# Patient Record
Sex: FEMALE | Race: WHITE | HISPANIC OR LATINO | Employment: PART TIME | URBAN - METROPOLITAN AREA
[De-identification: names, ages, dates, MRNs, and addresses within clinical notes are randomized per-mention and may not be internally consistent; named-entity substitution may affect disease eponyms.]

---

## 2017-06-26 ENCOUNTER — APPOINTMENT (EMERGENCY)
Dept: ULTRASOUND IMAGING | Facility: HOSPITAL | Age: 27
End: 2017-06-26
Payer: COMMERCIAL

## 2017-06-26 ENCOUNTER — HOSPITAL ENCOUNTER (EMERGENCY)
Facility: HOSPITAL | Age: 27
Discharge: HOME | End: 2017-06-26
Attending: EMERGENCY MEDICINE | Admitting: EMERGENCY MEDICINE
Payer: COMMERCIAL

## 2017-06-26 VITALS
DIASTOLIC BLOOD PRESSURE: 62 MMHG | SYSTOLIC BLOOD PRESSURE: 109 MMHG | RESPIRATION RATE: 16 BRPM | OXYGEN SATURATION: 100 % | TEMPERATURE: 98.7 F | WEIGHT: 160.94 LBS | HEART RATE: 63 BPM

## 2017-06-26 DIAGNOSIS — R10.2 PELVIC PAIN: Primary | ICD-10-CM

## 2017-06-26 LAB
ALBUMIN SERPL BCP-MCNC: 3.7 G/DL (ref 3.5–5)
ALP SERPL-CCNC: 60 U/L (ref 46–116)
ALT SERPL W P-5'-P-CCNC: 11 U/L (ref 12–78)
ANION GAP SERPL CALCULATED.3IONS-SCNC: 11 MMOL/L (ref 4–13)
AST SERPL W P-5'-P-CCNC: 15 U/L (ref 5–45)
B-HCG SERPL-ACNC: 29 MIU/ML
BASOPHILS # BLD AUTO: 0.05 THOUSANDS/ΜL (ref 0–0.1)
BASOPHILS NFR BLD AUTO: 1 % (ref 0–1)
BILIRUB SERPL-MCNC: 0.6 MG/DL (ref 0.2–1)
BUN SERPL-MCNC: 12 MG/DL (ref 5–25)
CALCIUM SERPL-MCNC: 8.7 MG/DL (ref 8.3–10.1)
CHLORIDE SERPL-SCNC: 104 MMOL/L (ref 100–108)
CLARITY, POC: CLEAR
CO2 SERPL-SCNC: 24 MMOL/L (ref 21–32)
COLOR, POC: YELLOW
CREAT SERPL-MCNC: 0.68 MG/DL (ref 0.6–1.3)
EOSINOPHIL # BLD AUTO: 0.13 THOUSAND/ΜL (ref 0–0.61)
EOSINOPHIL NFR BLD AUTO: 1 % (ref 0–6)
ERYTHROCYTE [DISTWIDTH] IN BLOOD BY AUTOMATED COUNT: 12.2 % (ref 11.6–15.1)
EXT BILIRUBIN, UA: NEGATIVE
EXT BLOOD URINE: NORMAL
EXT GLUCOSE, UA: NEGATIVE
EXT KETONES: NORMAL
EXT NITRITE, UA: NEGATIVE
EXT PH, UA: 7
EXT PROTEIN, UA: NEGATIVE
EXT SPECIFIC GRAVITY, UA: 1.01
EXT UROBILINOGEN: 0.2
GFR SERPL CREATININE-BSD FRML MDRD: >60 ML/MIN/1.73SQ M
GLUCOSE SERPL-MCNC: 81 MG/DL (ref 65–140)
HCG SERPL QL: POSITIVE
HCT VFR BLD AUTO: 38.4 % (ref 34.8–46.1)
HGB BLD-MCNC: 13 G/DL (ref 11.5–15.4)
LYMPHOCYTES # BLD AUTO: 2.46 THOUSANDS/ΜL (ref 0.6–4.47)
LYMPHOCYTES NFR BLD AUTO: 26 % (ref 14–44)
MCH RBC QN AUTO: 31 PG (ref 26.8–34.3)
MCHC RBC AUTO-ENTMCNC: 33.9 G/DL (ref 31.4–37.4)
MCV RBC AUTO: 91 FL (ref 82–98)
MONOCYTES # BLD AUTO: 0.73 THOUSAND/ΜL (ref 0.17–1.22)
MONOCYTES NFR BLD AUTO: 8 % (ref 4–12)
NEUTROPHILS # BLD AUTO: 6.06 THOUSANDS/ΜL (ref 1.85–7.62)
NEUTS SEG NFR BLD AUTO: 64 % (ref 43–75)
NRBC BLD AUTO-RTO: 0 /100 WBCS
PLATELET # BLD AUTO: 289 THOUSANDS/UL (ref 149–390)
PMV BLD AUTO: 9.7 FL (ref 8.9–12.7)
POTASSIUM SERPL-SCNC: 3.5 MMOL/L (ref 3.5–5.3)
PROT SERPL-MCNC: 6.8 G/DL (ref 6.4–8.2)
RBC # BLD AUTO: 4.2 MILLION/UL (ref 3.81–5.12)
SODIUM SERPL-SCNC: 139 MMOL/L (ref 136–145)
WBC # BLD AUTO: 9.48 THOUSAND/UL (ref 4.31–10.16)
WBC # BLD EST: NORMAL 10*3/UL

## 2017-06-26 PROCEDURE — 86901 BLOOD TYPING SEROLOGIC RH(D): CPT | Performed by: EMERGENCY MEDICINE

## 2017-06-26 PROCEDURE — 76815 OB US LIMITED FETUS(S): CPT

## 2017-06-26 PROCEDURE — 36415 COLL VENOUS BLD VENIPUNCTURE: CPT | Performed by: EMERGENCY MEDICINE

## 2017-06-26 PROCEDURE — 80053 COMPREHEN METABOLIC PANEL: CPT | Performed by: EMERGENCY MEDICINE

## 2017-06-26 PROCEDURE — 85025 COMPLETE CBC W/AUTO DIFF WBC: CPT | Performed by: EMERGENCY MEDICINE

## 2017-06-26 PROCEDURE — 84702 CHORIONIC GONADOTROPIN TEST: CPT | Performed by: EMERGENCY MEDICINE

## 2017-06-26 PROCEDURE — 86900 BLOOD TYPING SEROLOGIC ABO: CPT | Performed by: EMERGENCY MEDICINE

## 2017-06-26 PROCEDURE — 81002 URINALYSIS NONAUTO W/O SCOPE: CPT | Performed by: EMERGENCY MEDICINE

## 2017-06-26 PROCEDURE — 86850 RBC ANTIBODY SCREEN: CPT | Performed by: EMERGENCY MEDICINE

## 2017-06-26 PROCEDURE — 96360 HYDRATION IV INFUSION INIT: CPT

## 2017-06-26 PROCEDURE — 84703 CHORIONIC GONADOTROPIN ASSAY: CPT | Performed by: EMERGENCY MEDICINE

## 2017-06-26 PROCEDURE — 96361 HYDRATE IV INFUSION ADD-ON: CPT

## 2017-06-26 PROCEDURE — 99284 EMERGENCY DEPT VISIT MOD MDM: CPT

## 2017-06-26 RX ORDER — CEPHALEXIN 500 MG/1
500 CAPSULE ORAL 2 TIMES DAILY
COMMUNITY
End: 2017-10-10

## 2017-06-26 RX ADMIN — SODIUM CHLORIDE 1000 ML: 0.9 INJECTION, SOLUTION INTRAVENOUS at 16:37

## 2017-07-01 LAB
ABO GROUP BLD: NORMAL
BLD GP AB SCN SERPL QL: NEGATIVE
RH BLD: POSITIVE
SPECIMEN EXPIRATION DATE: NORMAL

## 2017-10-10 ENCOUNTER — HOSPITAL ENCOUNTER (EMERGENCY)
Facility: HOSPITAL | Age: 27
Discharge: HOME | End: 2017-10-11
Attending: EMERGENCY MEDICINE | Admitting: EMERGENCY MEDICINE
Payer: COMMERCIAL

## 2017-10-10 VITALS
HEART RATE: 82 BPM | BODY MASS INDEX: 23.49 KG/M2 | DIASTOLIC BLOOD PRESSURE: 64 MMHG | WEIGHT: 155 LBS | SYSTOLIC BLOOD PRESSURE: 118 MMHG | TEMPERATURE: 98.4 F | RESPIRATION RATE: 16 BRPM | OXYGEN SATURATION: 97 % | HEIGHT: 68 IN

## 2017-10-10 DIAGNOSIS — K64.4 EXTERNAL HEMORRHOID: Primary | ICD-10-CM

## 2017-10-10 RX ORDER — LIDOCAINE HYDROCHLORIDE 20 MG/ML
JELLY TOPICAL ONCE
Status: DISCONTINUED | OUTPATIENT
Start: 2017-10-10 | End: 2017-10-11 | Stop reason: HOSPADM

## 2017-10-10 RX ORDER — DOCUSATE SODIUM 100 MG/1
100 CAPSULE, LIQUID FILLED ORAL 2 TIMES DAILY PRN
Qty: 60 CAPSULE | Refills: 0 | Status: SHIPPED | OUTPATIENT
Start: 2017-10-10 | End: 2018-03-30

## 2017-10-10 RX ORDER — LIDOCAINE 50 MG/G
OINTMENT TOPICAL AS NEEDED
Qty: 35.44 G | Refills: 0 | Status: SHIPPED | OUTPATIENT
Start: 2017-10-10 | End: 2018-03-30

## 2017-10-11 PROCEDURE — 99282 EMERGENCY DEPT VISIT SF MDM: CPT

## 2017-10-11 NOTE — ED PROVIDER NOTES
History  Chief Complaint   Patient presents with    Hemorrhoids     pt c/o 8/10 pain in her rectum from external hemmorhoids x 4 days unrelieved by ointments, ice packs and witch hazel pads  Pt denies any rectal bleeding  HPI    None       Past Medical History:   Diagnosis Date    Hemorrhoids        History reviewed  No pertinent surgical history  History reviewed  No pertinent family history  I have reviewed and agree with the history as documented  Social History   Substance Use Topics    Smoking status: Never Smoker    Smokeless tobacco: Never Used    Alcohol use No        Review of Systems    Physical Exam  ED Triage Vitals   Temperature Pulse Respirations Blood Pressure SpO2   10/10/17 1943 10/10/17 1940 10/10/17 1940 10/10/17 1940 10/10/17 1940   98 4 °F (36 9 °C) 82 16 118/64 97 %      Temp Source Heart Rate Source Patient Position - Orthostatic VS BP Location FiO2 (%)   10/10/17 1943 10/10/17 1940 10/10/17 1940 10/10/17 1940 --   Oral Monitor Standing Right arm       Pain Score       10/10/17 1940       8           Physical Exam   Constitutional: She is oriented to person, place, and time  She appears well-developed and well-nourished  No distress  HENT:   Head: Normocephalic and atraumatic  Eyes: Conjunctivae are normal  Pupils are equal, round, and reactive to light  Neck: Normal range of motion  No tracheal deviation present  Cardiovascular: Normal rate, regular rhythm and intact distal pulses  Pulses:       Radial pulses are 2+ on the right side  Pulmonary/Chest: Effort normal  No respiratory distress  Abdominal: Soft  There is no tenderness  Genitourinary: Rectal exam shows external hemorrhoid and tenderness  Rectal exam shows no fissure, no mass and anal tone normal    Neurological: She is alert and oriented to person, place, and time  Skin: Skin is warm and dry  Psychiatric: She has a normal mood and affect   Her behavior is normal    Nursing note and vitals reviewed  ED Medications  Medications - No data to display    Diagnostic Studies  Labs Reviewed - No data to display    No orders to display       Procedures  Procedures      Phone Contacts  ED Phone Contact    ED Course  ED Course                                MDM  Number of Diagnoses or Management Options  External hemorrhoid: new and does not require workup  Diagnosis management comments: This is a 69-year-old female who presents here with pain from hemorrhoids  She has had a small hemorrhoids before that have never caused problems  She states she has never seen anybody for them previously  She states four days ago she developed pain in the area  She has been using Sitz baths, Anusol ointment, ice packs, and witch Hazel pads without improvement of the pain  She endorses significant pain with moving her bowels  She denies any rectal bleeding  She denies any trauma to the area, underlying GI issues, recent constipation, pushing or straining to move her bowels  She has no other complaints  ROS: Otherwise negative, unless stated as above  She is well-appearing, in no acute distress  She has a small, significantly tender hemorrhoid that is not thrombosed  There are no other signs of acute abnormalities  I discussed with her additional treatment at home with topical lidocaine, follow up with her primary care doctor and either GI doctor or surgeon for further evaluation, and she expresses understanding with this plan  CritCare Time    Disposition  Final diagnoses:   External hemorrhoid     ED Disposition     ED Disposition Condition Comment    Discharge  Evette Mei discharge to home/self care      Condition at discharge: Good        Follow-up Information     Follow up With Specialties Details Why Contact Navya Lin DO  Schedule an appointment as soon as possible for a visit to follow up on your symptoms Amor ARIZMENDI/ Abelino Alcaraz  [facility] Sil Hoff MD General Surgery Schedule an appointment as soon as possible for a visit to follow up with a surgeon for your hemorrhoids [address]  [address]  [address] [place] [address] [phone]          Patient's Medications   Discharge Prescriptions    DOCUSATE SODIUM (COLACE) 100 MG CAPSULE    Take 1 capsule by mouth 2 (two) times a day as needed (until hemorrhoids resolve)       Start Date: 10/10/2017End Date: --       Order Dose: 100 mg       Quantity: 60 capsule    Refills: 0    LIDOCAINE (XYLOCAINE) 5 % OINTMENT    Apply topically as needed (pain)       Start Date: 10/10/2017End Date: --       Order Dose: --       Quantity: 35 44 g    Refills: 0     No discharge procedures on file      ED Provider  Electronically Signed by       Kareen Mathis MD  10/11/17 1966

## 2017-10-11 NOTE — DISCHARGE INSTRUCTIONS
Continue taking medications that you have been taking take the stool softener until your hemorrhoids improved  Apply the topical lidocaine to the area as needed  Sit on a donut pillow when you are sitting, take the pressure off of the hemorrhoid  Follow up the primary care doctor for further evaluation  See a GI doctor or surgeon for further evaluation of your symptoms  Hemorrhoids   WHAT YOU NEED TO KNOW:   Hemorrhoids are swollen blood vessels inside your rectum (internal hemorrhoids) or on your anus (external hemorrhoids)  Sometimes a hemorrhoid may prolapse  This means it extends out of your anus  DISCHARGE INSTRUCTIONS:   Return to the emergency department if:   · You have severe pain in your rectum or around your anus  · You have severe pain in your abdomen and you are vomiting  · You have bleeding from your anus that soaks through your underwear  Contact your healthcare provider if:   · You have frequent and painful bowel movements  · Your hemorrhoid looks or feels more swollen than usual      · You do not have a bowel movement for 2 days or more  · You see or feel tissue coming through your anus  · You have questions or concerns about your condition or care  Medicines: You may  need any of the following:  · A pad, cream, or ointment  can help decrease pain, swelling, and itching  · Stool softeners  help treat or prevent constipation  · NSAIDs , such as ibuprofen, help decrease swelling, pain, and fever  NSAIDs can cause stomach bleeding or kidney problems in certain people  If you take blood thinner medicine, always ask your healthcare provider if NSAIDs are safe for you  Always read the medicine label and follow directions  · Take your medicine as directed  Contact your healthcare provider if you think your medicine is not helping or if you have side effects  Tell him or her if you are allergic to any medicine   Keep a list of the medicines, vitamins, and herbs you take  Include the amounts, and when and why you take them  Bring the list or the pill bottles to follow-up visits  Carry your medicine list with you in case of an emergency  Manage your symptoms:   · Apply ice on your anus for 15 to 20 minutes every hour or as directed  Use an ice pack, or put crushed ice in a plastic bag  Cover it with a towel before you apply it to your anus  Ice helps prevent tissue damage and decreases swelling and pain  · Take a sitz bath  Fill a bathtub with 4 to 6 inches of warm water  You may also use a sitz bath pan that fits inside a toilet bowl  Sit in the sitz bath for 15 minutes  Do this 3 times a day, and after each bowel movement  The warm water can help decrease pain and swelling  · Keep your anal area clean  Gently wash the area with warm water daily  Soap may irritate the area  After a bowel movement, wipe with moist towelettes or wet toilet paper  Dry toilet paper can irritate the area  Prevent hemorrhoids:   · Do not strain to have a bowel movement  Do not sit on the toilet too long  These actions can increase pressure on the tissues in your rectum and anus  · Drink plenty of liquids  Liquids can help prevent constipation  Ask how much liquid to drink each day and which liquids are best for you  · Eat a variety of high-fiber foods  Examples include fruits, vegetables, and whole grains  Ask your healthcare provider how much fiber you need each day  You may need to take a fiber supplement  · Exercise as directed  Exercise, such as walking, may make it easier to have a bowel movement  Ask your healthcare provider to help you create an exercise plan  · Do not have anal sex  Anal sex can weaken the skin around your rectum and anus  · Avoid heavy lifting  This can cause straining and increase your risk for another hemorrhoid    Follow up with your healthcare provider as directed:  Write down your questions so you remember to ask them during your visits  © 2017 [employer] Information is for End User's use only and may not be sold, redistributed or otherwise used for commercial purposes  All illustrations and images included in CareNotes® are the copyrighted property of [employer]  or Goran Carrillo  The above information is an  only  It is not intended as medical advice for individual conditions or treatments  Talk to your doctor, nurse or pharmacist before following any medical regimen to see if it is safe and effective for you

## 2017-12-06 ENCOUNTER — GENERIC CONVERSION - ENCOUNTER (OUTPATIENT)
Dept: OTHER | Facility: OTHER | Age: 27
End: 2017-12-06

## 2018-01-23 ENCOUNTER — ALLSCRIPTS OFFICE VISIT (OUTPATIENT)
Dept: OTHER | Facility: OTHER | Age: 28
End: 2018-01-23

## 2018-01-24 VITALS
SYSTOLIC BLOOD PRESSURE: 114 MMHG | HEIGHT: 68 IN | WEIGHT: 164 LBS | BODY MASS INDEX: 24.86 KG/M2 | DIASTOLIC BLOOD PRESSURE: 70 MMHG

## 2018-01-24 NOTE — PROGRESS NOTES
Assessment    1  IUD (intrauterine device) in place (V45 51) (Z97 5)    Discussion/Summary  Discussion Summary:   IUD in place  Plan for follow up annual    Goals and Barriers: The patient has the current Goals: Reliable contraception  The patent has the current Barriers: None  Patient's Capacity to Self-Care: Patient is able to Self-Care  Medication SE Review and Pt Understands Tx: The treatment plan was reviewed with the patient/guardian  The patient/guardian understands and agrees with the treatment plan   Self Referrals:   Self Referrals: No      Chief Complaint  Chief Complaint Free Text Note Form: Patient here for one month f/u Paragard insertion, doing well  History of Present Illness  HPI: 32year old G0 here for follow up paraguard  No complaints  Review of Systems   Female ROS: no pelvic pain, no vaginal pain, no vaginal discharge, no vaginal itching, no vaginal lump or mass and no vaginal odor  Focused-Female:   Constitutional: No fever, no chills, feels well, no tiredness, no recent weight gain or loss  ENT: no ear ache, no loss of hearing, no nosebleeds or nasal discharge, no sore throat or hoarseness  Cardiovascular: no complaints of slow or fast heart rate, no chest pain, no palpitations, no leg claudication or lower extremity edema  Respiratory: no complaints of shortness of breath, no wheezing, no dyspnea on exertion, no orthopnea or PND  Breasts: no complaints of breast pain, breast lump or nipple discharge  Gastrointestinal: no complaints of abdominal pain, no constipation, no nausea or diarrhea, no vomiting, no bloody stools  Genitourinary: no complaints of dysuria, no incontinence, no pelvic pain, no dysmenorrhea, no vaginal discharge or abnormal vaginal bleeding  Musculoskeletal: no complaints of arthralgia, no myalgia, no joint swelling or stiffness, no limb pain or swelling     Integumentary: no complaints of skin rash or lesion, no itching or dry skin, no skin wounds  Neurological: no complaints of headache, no confusion, no numbness or tingling, no dizziness or fainting  Past Medical History    1  History of depression (V11 8) (Z86 59)   2  History of migraine (V12 49) (Z86 69)   3  History of recurrent urinary tract infection (V13 02) (Z87 440)   4  History of sleep disturbance (V13 89) (P56 921)    Family History  Father    1  Family history of essential hypertension (V17 49) (Z82 49)  Maternal Grandmother    2  Family history of malignant neoplasm of breast (V16 3) (Z80 3)  Maternal Aunt    3  Family history of malignant neoplasm of breast (V16 3) (Z80 3)   4  Family history of malignant neoplasm of ovary (V16 41) (Z80 41)  Uncle    5  Family history of type 2 diabetes mellitus (V18 0) (Z83 3)    Social History    · IUD (intrauterine device) in place (V45 51) (Z97 5)    Current Meds   1  No Reported Medications Recorded    Allergies    1  No Known Drug Allergies    Vitals  Vital Signs    Recorded: [id number] 15:31AE   Systolic 730   Diastolic 66   Height 5 ft 8 in   Weight 166 lb    BMI Calculated 25 24   BSA Calculated 1 89   LMP [id number]     Physical Exam    Genitourinary   External genitalia: Normal and no lesions appreciated  Vagina: Normal, no lesions or dryness appreciated  Urethra: Normal     Urethral meatus: Normal     Bladder: Normal, soft, non-tender and no prolapse or masses appreciated  Cervix: Normal, no palpable masses  An IUD string        Signatures   Electronically signed by : Robet Peabody, MD; Jan 23 2018 10:18AM EST                       (Author)

## 2018-02-20 ENCOUNTER — APPOINTMENT (OUTPATIENT)
Dept: RADIOLOGY | Facility: CLINIC | Age: 28
End: 2018-02-20
Payer: COMMERCIAL

## 2018-02-20 ENCOUNTER — OFFICE VISIT (OUTPATIENT)
Dept: URGENT CARE | Facility: CLINIC | Age: 28
End: 2018-02-20
Payer: COMMERCIAL

## 2018-02-20 VITALS
TEMPERATURE: 98.7 F | HEIGHT: 68 IN | HEART RATE: 87 BPM | SYSTOLIC BLOOD PRESSURE: 108 MMHG | RESPIRATION RATE: 16 BRPM | OXYGEN SATURATION: 97 % | BODY MASS INDEX: 24.71 KG/M2 | DIASTOLIC BLOOD PRESSURE: 68 MMHG | WEIGHT: 163 LBS

## 2018-02-20 DIAGNOSIS — M25.561 ACUTE PAIN OF BOTH KNEES: Primary | ICD-10-CM

## 2018-02-20 DIAGNOSIS — M25.562 ACUTE PAIN OF BOTH KNEES: Primary | ICD-10-CM

## 2018-02-20 PROCEDURE — 73562 X-RAY EXAM OF KNEE 3: CPT

## 2018-02-20 PROCEDURE — 99283 EMERGENCY DEPT VISIT LOW MDM: CPT | Performed by: PHYSICIAN ASSISTANT

## 2018-02-20 PROCEDURE — G0382 LEV 3 HOSP TYPE B ED VISIT: HCPCS | Performed by: PHYSICIAN ASSISTANT

## 2018-02-20 RX ORDER — IBUPROFEN 800 MG/1
800 TABLET ORAL EVERY 6 HOURS PRN
Qty: 30 TABLET | Refills: 0 | Status: SHIPPED | OUTPATIENT
Start: 2018-02-20 | End: 2018-03-30 | Stop reason: SDUPTHER

## 2018-02-20 NOTE — PROGRESS NOTES
[facility]        NAME: Wesley Lind is a 32 y o  female  : 1990    MRN: [id number]  DATE: 2018  TIME: 10:00 AM    Assessment and Plan   Acute pain of both knees [M25 561, M25 562]  1  Acute pain of both knees  X-ray knee left 3 views    X-ray knee right 3 views         Patient Instructions   Elevate both knees, ice, rest   Ibuprofen 800mg prescribed for pain  Follow up with PCP or orthopedic physician in 3-5 days  Proceed to  ER if symptoms worsen  Chief Complaint     Chief Complaint   Patient presents with    Knee Pain     BL knee pain  greater in R knee  fell down 2 flights of outdoor steps yesterday  ecchymosis to R knee and L forearm  History of Present Illness   Wesley Lind presents to the clinic c/o bilateral knee and left arm pain s/p fall down several steps yesterday  She states that her right knee hurts the worst-pain worse when knee is flexed  She is taking Motrin for the pain with minimal improvement  Pain 8/10 with movement  She is able to walk but with pain  She was diagnosed with bilateral patellar subluxation in the past which required physical therapy, however, she denies prior knee trauma or surgery  Her left arm is ecchymotic but she denies pain  No head injury  Negative syncope  HPI    Review of Systems   Review of Systems   Constitutional: Positive for activity change  Negative for fever  HENT: Negative  Eyes: Negative  Respiratory: Negative  Cardiovascular: Negative  Gastrointestinal: Negative  Genitourinary: Negative  Musculoskeletal: Positive for arthralgias, gait problem and joint swelling  Negative for back pain and neck pain  Skin: Negative  Allergic/Immunologic: Negative  Neurological: Negative for dizziness and weakness  Hematological: Does not bruise/bleed easily  Psychiatric/Behavioral: Negative            Current Medications       Current Outpatient Prescriptions:     docusate sodium (COLACE) 100 mg capsule, Take 1 capsule by mouth 2 (two) times a day as needed (until hemorrhoids resolve), Disp: 60 capsule, Rfl: 0    lidocaine (XYLOCAINE) 5 % ointment, Apply topically as needed (pain), Disp: 35 44 g, Rfl: 0    Current Allergies     Allergies as of 02/20/2018    (No Known Allergies)            The following portions of the patient's history were reviewed and updated as appropriate: allergies, current medications, past family history, past medical history, past social history, past surgical history and problem list     Objective   /68 (BP Location: Right arm, Patient Position: Sitting, Cuff Size: Standard)   Pulse 87   Temp 98 7 °F (37 1 °C) (Tympanic)   Resp 16   Ht 5' 8" (1 727 m)   Wt 73 9 kg (163 lb)   SpO2 97%   BMI 24 78 kg/m²        Physical Exam     Physical Exam   Constitutional: She appears well-developed and well-nourished  No distress  HENT:   Head: Normocephalic and atraumatic  Eyes: Conjunctivae and EOM are normal  Pupils are equal, round, and reactive to light  Right eye exhibits no discharge  Left eye exhibits no discharge  No scleral icterus  Pulmonary/Chest: Effort normal    Musculoskeletal:        Arms:       Legs:  Skin: She is not diaphoretic

## 2018-02-20 NOTE — PATIENT INSTRUCTIONS
Elevate both knees, ice, rest   Ibuprofen 800mg prescribed for pain  Follow up with PCP or orthopedic physician in 3-5 days  Proceed to  ER if symptoms worsen  Knee Exercises   AMBULATORY CARE:   What you need to know about knee exercises:  Knee exercises help strengthen the muscles around your knee  Strong muscles can help reduce pain and decrease your risk of future injury  Knee exercises also help you heal after an injury or surgery  · Start slow  These are beginning exercises  Ask your healthcare provider if you need to see a physical therapist for more advanced exercises  As you get stronger, you may be able to do more sets of each exercise or add weights  · Stop if you feel pain  It is normal to feel some discomfort at first  Regular exercise will help decrease your discomfort over time  · Do the exercises on both legs  Do this so both knees remain strong  · Warm up before you do knee exercises  Walk or ride a stationary bike for 5 or 10 minutes to warm your muscles  How to perform knee stretches safely:  Always stretch before you do strengthening exercises  Do these stretching exercises again after you do the strengthening exercises  Do these stretches 4 or 5 days a week, or as directed  · Standing calf stretch: Face a wall and place both palms flat on the wall, or hold the back of a chair for balance  Keep a slight bend in your knees  Take a big step backward with one leg  Keep your other leg directly under you  Keep both heels flat and press your hips forward  Hold the stretch for 30 seconds, and then relax for 30 seconds  Switch legs  Repeat 2 or 3 times on each leg  · Standing quadriceps stretch:  Stand and place one hand against a wall or hold the back of a chair for balance  With your weight on one leg, bend your other leg and grab your ankle  Bring your heel toward your buttocks  Hold the stretch for 30 to 60 seconds  Switch legs   Repeat 2 or 3 times on each leg            · Sitting hamstring stretch:  Sit with both legs straight in front of you  Do not point or flex your toes  Place your palms on the floor and slide your hands forward until you feel the stretch  Do not round your back  Hold the stretch for 30 seconds  Repeat 2 or 3 times  How to perform knee strengthening exercises safely:  Do these exercises 4 or 5 days a week, or as directed  · Standing half squats:  Stand with your feet shoulder-width apart  Lean your back against a wall or hold the back of a chair for balance, if needed  Slowly sit down about 10 inches, as if you are going to sit in a chair  Your body weight should be mostly over your heels  Hold the squat for 5 seconds, then rise to a standing position  Do 3 sets of 10 squats to strengthen your buttocks and thighs  · Standing hamstring curls: Face a wall and place both palms flat on the wall, or hold the back of a chair for balance  With your weight on one leg, lift your other foot as close to your buttocks as you can  Hold for 5 seconds and then lower your leg  Do 2 sets of 10 curls on each leg  This exercise strengthens the muscles in the back of your thigh  · Standing calf raises:  Face a wall and place both palms flat on the wall, or hold the back of a chair for balance  Stand up straight, and do not lean  Place all your weight on one leg by lifting the other foot off the floor  Raise the heel of the foot that is on the floor as high as you can and then lower it  Do 2 sets of 10 calf raises on each leg to strengthen your calf muscles  · Straight leg lifts:  Lie on your stomach with straight legs  Fold your arms in front of you and rest your head in your arms  Tighten your leg muscles and raise one leg as high as you can  Hold for 5 seconds, then lower your leg  Do 2 sets of 10 lifts on each leg to strengthen your buttocks  · Sitting leg lifts:  Sit in a chair  Slowly straighten and raise one leg  Squeeze your thigh muscles and hold for 5 seconds  Relax and return your foot to the floor  Do 2 sets of 10 lifts on each leg  This helps strengthen the muscles in the front of your thigh  Contact your healthcare provider if:   · You have new pain or your pain becomes worse  · You have questions or concerns about your condition or care  © 2017 [employer]  Information is for End User's use only and may not be sold, redistributed or otherwise used for commercial purposes  All illustrations and images included in CareNotes® are the copyrighted property of [email or web address]  or Goran Carrillo  The above information is an  only  It is not intended as medical advice for individual conditions or treatments  Talk to your doctor, nurse or pharmacist before following any medical regimen to see if it is safe and effective for you

## 2018-03-02 ENCOUNTER — OFFICE VISIT (OUTPATIENT)
Dept: OBGYN CLINIC | Facility: MEDICAL CENTER | Age: 28
End: 2018-03-02
Payer: COMMERCIAL

## 2018-03-02 VITALS
HEART RATE: 67 BPM | DIASTOLIC BLOOD PRESSURE: 70 MMHG | WEIGHT: 163 LBS | HEIGHT: 68 IN | SYSTOLIC BLOOD PRESSURE: 103 MMHG | BODY MASS INDEX: 24.71 KG/M2

## 2018-03-02 DIAGNOSIS — M25.561 PAIN IN BOTH KNEES, UNSPECIFIED CHRONICITY: Primary | ICD-10-CM

## 2018-03-02 DIAGNOSIS — M25.562 PAIN IN BOTH KNEES, UNSPECIFIED CHRONICITY: Primary | ICD-10-CM

## 2018-03-02 PROCEDURE — 99203 OFFICE O/P NEW LOW 30 MIN: CPT | Performed by: ORTHOPAEDIC SURGERY

## 2018-03-02 NOTE — PROGRESS NOTES
32 y o female presents for evaluation of bilateral knee pain  Patient states that she had a recent mechanical fall onto bilateral flex knees  Soon thereafter she had another fall after tripping over her dog  Pain is well localized to the anterior aspect of bilateral knees and she describe significant stiffness  She denies any symptoms of instability including clicking popping or locking  Patient states that she has been having significant pain in bilateral knees and has actually required walker for ambulation at home  She denies any numbness or tingling  Pain is made worse with direct of his infected area and ambulation  Pain subsided somewhat at rest   Patient has been taking occasional ibuprofen but said that she does not take more than 1 or 2 800 mg capsules on a daily basis  Review of Systems  Review of systems negative unless otherwise specified in HPI    Past Medical History  Past Medical History:   Diagnosis Date    Hemorrhoids        Past Surgical History  Past Surgical History:   Procedure Laterality Date    NO PAST SURGERIES         Current Medications  Current Outpatient Prescriptions on File Prior to Visit   Medication Sig Dispense Refill    docusate sodium (COLACE) 100 mg capsule Take 1 capsule by mouth 2 (two) times a day as needed (until hemorrhoids resolve) 60 capsule 0    ibuprofen (MOTRIN) 800 mg tablet Take 1 tablet (800 mg total) by mouth every 6 (six) hours as needed for moderate pain 30 tablet 0    lidocaine (XYLOCAINE) 5 % ointment Apply topically as needed (pain) 35 44 g 0     No current facility-administered medications on file prior to visit          Recent Labs [facility])    0  Lab Value Date/Time   HCT 38 4 06/26/2017 1637   HGB 13 0 06/26/2017 1637   WBC 9 48 06/26/2017 1637   GLUCOSE 81 06/26/2017 1637         Physical exam  · General: Awake, Alert, Oriented  · Eyes: Pupils equal, round and reactive to light  · Heart: regular rate and rhythm  · Lungs: No audible wheezing  · Abdomen: soft  bilateral Knee exam  · Skin intact, no erythema, no ecchymosis  · No palpable knee effusion  · Knee stable to varus and valgus stress  · Mild tenderness palpation of her anterior knee, no significant medial lateral joint line tenderness  · Distal extremity warm and sensate         Imaging  Unable to to review given power outage at its at today's visit    Assessment:   32 y  o female status post mechanical follow-up bilateral anterior knee pain likely secondary to contusion    Plan  · Given patient's lack of joint line tenderness, effusion or clinical instability, patient's symptoms are likely secondary to contusion  Patient was offered corticosteroid injection for her knee pain however she declined  Patient was then instructed that she should take approximately 2400 mg of ibuprofen on a daily basis for the next several weeks in attempt to limit the inflammation in her knees  Patient was in agreement with this plan  Patient will remain weight-bearing as tolerated bilateral lower extremities  She will follow up in the office in 4 weeks time for repeat evaluation and possible radiographs

## 2018-03-30 ENCOUNTER — OFFICE VISIT (OUTPATIENT)
Dept: OBGYN CLINIC | Facility: MEDICAL CENTER | Age: 28
End: 2018-03-30
Payer: COMMERCIAL

## 2018-03-30 VITALS
HEIGHT: 68 IN | WEIGHT: 163 LBS | BODY MASS INDEX: 24.71 KG/M2 | SYSTOLIC BLOOD PRESSURE: 103 MMHG | HEART RATE: 73 BPM | DIASTOLIC BLOOD PRESSURE: 70 MMHG

## 2018-03-30 DIAGNOSIS — M25.561 ACUTE PAIN OF BOTH KNEES: ICD-10-CM

## 2018-03-30 DIAGNOSIS — M25.562 ACUTE PAIN OF BOTH KNEES: ICD-10-CM

## 2018-03-30 DIAGNOSIS — M25.561 ACUTE PAIN OF RIGHT KNEE: Primary | ICD-10-CM

## 2018-03-30 PROCEDURE — 99213 OFFICE O/P EST LOW 20 MIN: CPT | Performed by: ORTHOPAEDIC SURGERY

## 2018-03-30 RX ORDER — IBUPROFEN 800 MG/1
800 TABLET ORAL EVERY 6 HOURS PRN
Qty: 30 TABLET | Refills: 0 | Status: SHIPPED | OUTPATIENT
Start: 2018-03-30 | End: 2018-05-04

## 2018-03-30 NOTE — PROGRESS NOTES
32 y o female presents to the office for follow up of bilateral knee pain due to a fall  Today, she notes improvement in her pain  Her right knee still bothers her  She describes pain with flexion medially  She has been taking tylenol to control her pain  She denies numbness or tingling  Review of Systems  Review of systems negative unless otherwise specified in HPI    Past Medical History  Past Medical History:   Diagnosis Date    Hemorrhoids        Past Surgical History  Past Surgical History:   Procedure Laterality Date    NO PAST SURGERIES         Current Medications  Current Outpatient Prescriptions on File Prior to Visit   Medication Sig Dispense Refill    ibuprofen (MOTRIN) 800 mg tablet Take 1 tablet (800 mg total) by mouth every 6 (six) hours as needed for moderate pain 30 tablet 0    [DISCONTINUED] docusate sodium (COLACE) 100 mg capsule Take 1 capsule by mouth 2 (two) times a day as needed (until hemorrhoids resolve) 60 capsule 0    [DISCONTINUED] lidocaine (XYLOCAINE) 5 % ointment Apply topically as needed (pain) 35 44 g 0     No current facility-administered medications on file prior to visit  Recent Labs [facility]    0  Lab Value Date/Time   HCT 38 4 06/26/2017 1637   HGB 13 0 06/26/2017 1637   WBC 9 48 06/26/2017 1637   GLUCOSE 81 06/26/2017 1637         Physical exam  · General: Awake, Alert, Oriented  · Eyes: Pupils equal, round and reactive to light  · Heart: regular rate and rhythm  · Lungs: No audible wheezing  · Abdomen: soft  bilateral lower extremity  · Patient ambulates without assistance  · No effusion  · No ecchymosis  · Stable with valgus and varus stresses  · Tender to palpation medial joint line, right  · Pain with squatting, right      Imaging  None    Procedure  None    Assessment/Plan:   32 y  o female with bilateral knee pain will get an MRI of her right knee  She will continue with the use of tylenol to control her pain   We will see her back after tests are complete

## 2018-04-06 ENCOUNTER — HOSPITAL ENCOUNTER (OUTPATIENT)
Dept: MRI IMAGING | Facility: HOSPITAL | Age: 28
Discharge: HOME | End: 2018-04-06
Attending: ORTHOPAEDIC SURGERY
Payer: COMMERCIAL

## 2018-04-06 DIAGNOSIS — M25.561 ACUTE PAIN OF RIGHT KNEE: ICD-10-CM

## 2018-04-06 PROCEDURE — 73721 MRI JNT OF LWR EXTRE W/O DYE: CPT

## 2018-05-04 ENCOUNTER — OFFICE VISIT (OUTPATIENT)
Dept: OBGYN CLINIC | Facility: MEDICAL CENTER | Age: 28
End: 2018-05-04
Payer: COMMERCIAL

## 2018-05-04 VITALS
DIASTOLIC BLOOD PRESSURE: 69 MMHG | BODY MASS INDEX: 24.55 KG/M2 | SYSTOLIC BLOOD PRESSURE: 102 MMHG | HEART RATE: 82 BPM | WEIGHT: 162 LBS | HEIGHT: 68 IN

## 2018-05-04 DIAGNOSIS — M25.562 CHRONIC PAIN OF LEFT KNEE: Primary | ICD-10-CM

## 2018-05-04 DIAGNOSIS — M23.92 INTERNAL DERANGEMENT OF KNEE JOINT, LEFT: ICD-10-CM

## 2018-05-04 DIAGNOSIS — G89.29 CHRONIC PAIN OF LEFT KNEE: Primary | ICD-10-CM

## 2018-05-04 PROCEDURE — 99213 OFFICE O/P EST LOW 20 MIN: CPT | Performed by: ORTHOPAEDIC SURGERY

## 2018-05-04 NOTE — PROGRESS NOTES
32 y o female presents for follow-up  She continues describe pain in both knees right knee is getting better, left knee has persistent painful  She describes exquisite pain along the medial aspect the left knee  So far she has had rest, she has had anti-inflammatories support, and she has had x-rays  She has not yet undergone anything more diagnostic then x-rays  Review of Systems  Review of systems negative unless otherwise specified in HPI    Past Medical History  Past Medical History:   Diagnosis Date    Hemorrhoids        Past Surgical History  Past Surgical History:   Procedure Laterality Date    NO PAST SURGERIES         Current Medications  Current Outpatient Prescriptions on File Prior to Visit   Medication Sig Dispense Refill    [DISCONTINUED] ibuprofen (MOTRIN) 800 mg tablet Take 1 tablet (800 mg total) by mouth every 6 (six) hours as needed for moderate pain 30 tablet 0     No current facility-administered medications on file prior to visit  Recent Labs [facility])    0  Lab Value Date/Time   HCT 38 4 06/26/2017 1637   HGB 13 0 06/26/2017 1637   WBC 9 48 06/26/2017 1637   GLUCOSE 81 06/26/2017 1637         Physical exam  · General: Awake, Alert, Oriented  · Eyes: Pupils equal, round and reactive to light  · Heart: regular rate and rhythm  · Lungs: No audible wheezing  · Abdomen: soft  Neither knee is effused, left knee has no bony enlargement along the medial aspect, however there is exquisite tenderness  Her left knee is stable valgus stress  There is no anterior-posterior instability  Calf compartments are soft and supple  Toes are warm, sensate, mobile  Imaging  Views of the left knee from before reviewed, shows a healthy left knee without fractures, dislocations, joint space narrowing    Procedure  None indicated or performed today    Assessment/Plan:   32 y  o female who has a persistently painful left knee    Presence of tenderness at the medial joint line would be consistent with meniscal injury  An MRI scan is therefore soft diagnostic purposes to the left knee   I would welcome the opportunity see this patient back in the office at the MRI scans been complete, or determine the next step care sequence

## 2018-05-11 DIAGNOSIS — M23.92 DERANGEMENT OF COLLATERAL LIGAMENT OF LEFT KNEE: ICD-10-CM

## 2018-05-11 DIAGNOSIS — G89.29 CHRONIC PAIN OF LEFT KNEE: Primary | ICD-10-CM

## 2018-05-11 DIAGNOSIS — M25.562 CHRONIC PAIN OF LEFT KNEE: Primary | ICD-10-CM

## 2018-05-16 ENCOUNTER — OFFICE VISIT (OUTPATIENT)
Dept: OBGYN CLINIC | Age: 28
End: 2018-05-16
Payer: COMMERCIAL

## 2018-05-16 VITALS
SYSTOLIC BLOOD PRESSURE: 108 MMHG | HEIGHT: 68 IN | WEIGHT: 164 LBS | BODY MASS INDEX: 24.86 KG/M2 | DIASTOLIC BLOOD PRESSURE: 64 MMHG

## 2018-05-16 DIAGNOSIS — Z11.3 SCREENING FOR STDS (SEXUALLY TRANSMITTED DISEASES): ICD-10-CM

## 2018-05-16 DIAGNOSIS — N89.8 VAGINAL DISCHARGE: ICD-10-CM

## 2018-05-16 DIAGNOSIS — R10.2 PELVIC PAIN: Primary | ICD-10-CM

## 2018-05-16 PROCEDURE — 87491 CHLMYD TRACH DNA AMP PROBE: CPT | Performed by: NURSE PRACTITIONER

## 2018-05-16 PROCEDURE — 87591 N.GONORRHOEAE DNA AMP PROB: CPT | Performed by: NURSE PRACTITIONER

## 2018-05-16 PROCEDURE — 99213 OFFICE O/P EST LOW 20 MIN: CPT | Performed by: NURSE PRACTITIONER

## 2018-05-16 RX ORDER — METRONIDAZOLE 7.5 MG/G
1 GEL VAGINAL
Qty: 45 G | Refills: 0 | Status: SHIPPED | OUTPATIENT
Start: 2018-05-16 | End: 2018-05-21

## 2018-05-16 NOTE — PROGRESS NOTES
Assessment/Plan:    No problem-specific Assessment & Plan notes found for this encounter  Diagnoses and all orders for this visit:    Pelvic pain  -     US pelvis complete w transvaginal; Future    Vaginal discharge    Screening for STDs (sexually transmitted diseases)  -     Chlamydia/GC amplified DNA by PCR    Other orders  -     PARAGARD INTRAUTERINE COPPER IU; by Intrauterine route        Call if no symptom improvement, all questions answered  Subjective:      Patient ID: Yasir Ventura is a 32 y o  female  Pleasant 32 y o  here for vaginal complaints of mucousy discharge x 2 wks and pelvic pain x 6 wks  Last new partner was 3 months ago  Agrees to STD testing  She denies any treatments tried  Denies recent antibiotic use  Denies douching  Denies fever or dysparunia  IUD placed 2017  Last IC 3 mos ago  Monthly cycles since  Last IC 4 wks ago  The following portions of the patient's history were reviewed and updated as appropriate:   She  has a past medical history of Hemorrhoids  She   Patient Active Problem List    Diagnosis Date Noted    Pelvic pain 2018    Vaginal discharge 2018    Chronic pain of left knee 2018    Internal derangement of knee joint, left 2018     She  has a past surgical history that includes No past surgeries  Her family history includes Breast cancer in her other; No Known Problems in her father, mother, and sister; Uterine cancer in her maternal grandmother  She  reports that she has never smoked  She has never used smokeless tobacco  She reports that she drinks alcohol  She reports that she does not use drugs  Current Outpatient Prescriptions   Medication Sig Dispense Refill    PARAGARD INTRAUTERINE COPPER IU by Intrauterine route       No current facility-administered medications for this visit  She has No Known Allergies    OB History    Para Term  AB Living   1   0   1 0   SAB TAB Ectopic Multiple Live Births   1       0      # Outcome Date GA Lbr Miguel/2nd Weight Sex Delivery Anes PTL Lv   1 SAB                   Review of Systems   Constitutional: Negative for chills, fatigue, fever and unexpected weight change  Gastrointestinal: Negative for abdominal distention, blood in stool, constipation and diarrhea  Genitourinary: Positive for vaginal discharge  Negative for difficulty urinating, dyspareunia, dysuria, genital sores, hematuria, menstrual problem, pelvic pain and vaginal pain  Musculoskeletal: Negative for neck stiffness  Psychiatric/Behavioral: Negative for agitation and confusion  The patient is not nervous/anxious  Objective:      /64 (BP Location: Right arm, Patient Position: Sitting)   Ht 5' 8" (1 727 m)   Wt 74 4 kg (164 lb)   Breastfeeding? No   BMI 24 94 kg/m²          Physical Exam   Constitutional: She appears well-developed and well-nourished  She is cooperative  No distress  Abdominal: Soft  Hernia confirmed negative in the right inguinal area and confirmed negative in the left inguinal area  Genitourinary: No labial fusion  There is no rash, tenderness, lesion or injury on the right labia  There is no rash, tenderness, lesion or injury on the left labia  Uterus is not deviated, not enlarged, not fixed and not tender  Cervix exhibits no motion tenderness and no friability  Right adnexum displays no mass, no tenderness and no fullness  Left adnexum displays no mass, no tenderness and no fullness  No erythema, tenderness or bleeding in the vagina  No foreign body in the vagina  No signs of injury around the vagina  Vaginal discharge found  Lymphadenopathy:        Right: No inguinal adenopathy present  Left: No inguinal adenopathy present  Skin: She is not diaphoretic

## 2018-05-16 NOTE — PATIENT INSTRUCTIONS
Vaginal Discharge   WHAT YOU NEED TO KNOW:   Vaginal discharge is normal  It is usually clear or white and odorless  A change in the amount, smell, or color may indicate an underlying problem  Irritation, itching, or burning may also be a sign of a problem  Infection, a foreign body, or chemical irritants can cause abnormal vaginal discharge  Birth control pills, creams, or jellies may also cause abnormal vaginal discharge  DISCHARGE INSTRUCTIONS:   Medicines:   · Medicines  may help fight a fungal or bacterial infection  The medicine may be given as a pill  You may instead get a cream or gel you insert into your vagina  · Take your medicine as directed  Contact your healthcare provider if you think your medicine is not helping or if you have side effects  Tell him of her if you are allergic to any medicine  Keep a list of the medicines, vitamins, and herbs you take  Include the amounts, and when and why you take them  Bring the list or the pill bottles to follow-up visits  Carry your medicine list with you in case of an emergency  Contact your healthcare provider or gynecologist if:   · Your symptoms do not get better in 3 to 5 days  · You have trouble urinating, or you urinate often and with urgency  · You have abdominal pain or cramps  · Your discharge is bloody and it is not your monthly period  · You have pain with sexual intercourse  · You have a fever or chills  · You have low back pain or side pain  · You have questions or concerns about your condition or care  Self-care:   · Clean in and around your vagina with mild soap and warm water each day  Gently dry the area after washing  · Take a sitz bath  Fill a bathtub with 4 to 6 inches of warm water  You may also use a sitz bath pan that fits over a toilet  Sit in the sitz bath for 20 minutes  Do this 2 to 3 times a day, or as directed  The warm water can help decrease pain and swelling       · Do not wear tight-fitting clothes or undergarments  These can make your symptoms worse  · Ask about douching  Douching may help decrease your symptoms  Use a solution of 5 tablespoons of white vinegar mixed with 2 liters of warm water  · Do not have sex until your symptoms go away  Have your partner wear a condom until you complete your course of medication  Follow up with your healthcare provider or gynecologist in 1 week:  Write down your questions so you remember to ask them during your visits  © 2017 [employer] Information is for End User's use only and may not be sold, redistributed or otherwise used for commercial purposes  All illustrations and images included in CareNotes® are the copyrighted property of [employer]  or [employer]an [employer]illo  The above information is an  only  It is not intended as medical advice for individual conditions or treatments  Talk to your doctor, nurse or pharmacist before following any medical regimen to see if it is safe and effective for you

## 2018-05-18 LAB
CHLAMYDIA DNA CVX QL NAA+PROBE: NORMAL
N GONORRHOEA DNA GENITAL QL NAA+PROBE: NORMAL

## 2018-05-22 ENCOUNTER — TELEPHONE (OUTPATIENT)
Dept: OBGYN CLINIC | Facility: CLINIC | Age: 28
End: 2018-05-22

## 2018-05-22 DIAGNOSIS — B37.9 CANDIDIASIS: Primary | ICD-10-CM

## 2018-05-22 NOTE — TELEPHONE ENCOUNTER
Pt called office today, left voicemail message  Pt saw Jeffrey Bashir on 5/16/18, wants test results from visit  Pt can be reached @ [phone]

## 2018-05-22 NOTE — TELEPHONE ENCOUNTER
Spoke with pt - she is aware of gc/chla results  States then she may be having a reaction to the copper from the paragard IUD  She c/o burning, itching, bloody cottage cheese discharge and slight odor  Has not used anything OTC for it  Is going for her u/s tomorrow  Patient would like an RX  Please advise  Thanks!

## 2018-05-23 ENCOUNTER — HOSPITAL ENCOUNTER (OUTPATIENT)
Dept: ULTRASOUND IMAGING | Facility: HOSPITAL | Age: 28
Discharge: HOME | End: 2018-05-23
Payer: COMMERCIAL

## 2018-05-23 ENCOUNTER — TELEPHONE (OUTPATIENT)
Dept: OBGYN CLINIC | Facility: CLINIC | Age: 28
End: 2018-05-23

## 2018-05-23 DIAGNOSIS — R10.2 PELVIC PAIN: ICD-10-CM

## 2018-05-23 PROCEDURE — 76856 US EXAM PELVIC COMPLETE: CPT

## 2018-05-23 PROCEDURE — 76830 TRANSVAGINAL US NON-OB: CPT

## 2018-05-23 NOTE — TELEPHONE ENCOUNTER
Spoke with Pt today via phone call  Pt informed that her recent pelvic ultrasound was normal except for small fibroids (benign growths of uterus) per STEPHANY Choudhary's review  Pt states she is not having heavy or irregular menses at this time  Pt further states she just started taking medication for bloody vaginal discharge, was previously diagnosed with a yeast infection  Reiterated to Pt that if her symptoms worsen or she has questions/concerns, to contact office

## 2018-05-23 NOTE — TELEPHONE ENCOUNTER
----- Message from Freida Anderson, [address] sent at 5/23/2018 11:47 AM EDT -----  Please notify patient her pelvic u/s was normal except for small fibroids  No need to do anything unless she is having heavy or irregular menses  Thanks

## 2018-09-05 ENCOUNTER — APPOINTMENT (OUTPATIENT)
Dept: LAB | Facility: CLINIC | Age: 28
End: 2018-09-05
Payer: COMMERCIAL

## 2018-09-05 ENCOUNTER — TELEPHONE (OUTPATIENT)
Dept: OBGYN CLINIC | Age: 28
End: 2018-09-05

## 2018-09-05 DIAGNOSIS — Z11.3 SCREENING FOR STD (SEXUALLY TRANSMITTED DISEASE): Primary | ICD-10-CM

## 2018-09-05 DIAGNOSIS — Z11.3 SCREENING FOR STD (SEXUALLY TRANSMITTED DISEASE): ICD-10-CM

## 2018-09-05 PROCEDURE — 86696 HERPES SIMPLEX TYPE 2 TEST: CPT

## 2018-09-05 PROCEDURE — 86762 RUBELLA ANTIBODY: CPT

## 2018-09-05 PROCEDURE — 86592 SYPHILIS TEST NON-TREP QUAL: CPT

## 2018-09-05 PROCEDURE — 86695 HERPES SIMPLEX TYPE 1 TEST: CPT

## 2018-09-05 PROCEDURE — 87340 HEPATITIS B SURFACE AG IA: CPT

## 2018-09-05 PROCEDURE — 87389 HIV-1 AG W/HIV-1&-2 AB AG IA: CPT

## 2018-09-05 PROCEDURE — 36415 COLL VENOUS BLD VENIPUNCTURE: CPT

## 2018-09-05 NOTE — TELEPHONE ENCOUNTER
Patient is experiencing spotting and vaginal burning would like to be seen ASAP, next available it 09/21 she is scheduled  However she would like something sooner  Patient is willing to travel to [facility] or [facility]

## 2018-09-05 NOTE — TELEPHONE ENCOUNTER
Spoke with pt - she has a new partner - sexually active, no condom use - has IUD  Recently had her period  2 days ago she started to have spotting - see's only when she is wiping  Constant vaginal burning - feels like a cut  No discharge or itching  Got her a sooner appointment, but patient would like to have an STD blood panel done  Patient is worried  Ok to order? Please advise  Thanks!

## 2018-09-05 NOTE — TELEPHONE ENCOUNTER
Patient returned call - she is aware orders are in her chart - will get them done today  Also aware she will have to get them done again in 3-6 months to be sure

## 2018-09-06 LAB
HBV SURFACE AG SER QL: NORMAL
RPR SER QL: NORMAL
RUBV IGG SERPL IA-ACNC: >175 IU/ML

## 2018-09-07 ENCOUNTER — OFFICE VISIT (OUTPATIENT)
Dept: OBGYN CLINIC | Facility: CLINIC | Age: 28
End: 2018-09-07
Payer: COMMERCIAL

## 2018-09-07 VITALS
WEIGHT: 163 LBS | HEIGHT: 68 IN | SYSTOLIC BLOOD PRESSURE: 110 MMHG | BODY MASS INDEX: 24.71 KG/M2 | DIASTOLIC BLOOD PRESSURE: 66 MMHG | RESPIRATION RATE: 14 BRPM

## 2018-09-07 DIAGNOSIS — J39.2 LESION OF THROAT: ICD-10-CM

## 2018-09-07 DIAGNOSIS — Z11.3 SCREEN FOR STD (SEXUALLY TRANSMITTED DISEASE): Primary | ICD-10-CM

## 2018-09-07 DIAGNOSIS — B37.3 YEAST VAGINITIS: ICD-10-CM

## 2018-09-07 PROCEDURE — 99213 OFFICE O/P EST LOW 20 MIN: CPT | Performed by: PHYSICIAN ASSISTANT

## 2018-09-07 PROCEDURE — 87255 GENET VIRUS ISOLATE HSV: CPT | Performed by: PHYSICIAN ASSISTANT

## 2018-09-07 PROCEDURE — 87491 CHLMYD TRACH DNA AMP PROBE: CPT | Performed by: PHYSICIAN ASSISTANT

## 2018-09-07 PROCEDURE — 87591 N.GONORRHOEAE DNA AMP PROB: CPT | Performed by: PHYSICIAN ASSISTANT

## 2018-09-07 RX ORDER — FLUCONAZOLE 150 MG/1
TABLET ORAL
Qty: 2 TABLET | Refills: 0 | Status: SHIPPED | OUTPATIENT
Start: 2018-09-07 | End: 2018-09-10
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
## 2018-09-08 LAB — HIV 1+2 AB+HIV1 P24 AG SERPL QL IA: NORMAL

## 2018-09-10 ENCOUNTER — TELEPHONE (OUTPATIENT)
Dept: OBGYN CLINIC | Facility: CLINIC | Age: 28
End: 2018-09-10

## 2018-09-10 NOTE — TELEPHONE ENCOUNTER
Spoke with Pt today via phone call  Pt informed that her recent test results for CHL/GC are still "in process"  Pt further informed that [facility] staff will notify her of said test results upon receipt and review of said results by a provider  Pt states she will wait for staff to contact her with said test results

## 2018-09-11 LAB
CHLAMYDIA DNA CVX QL NAA+PROBE: NORMAL
HSV SPEC CULT: NORMAL
N GONORRHOEA DNA GENITAL QL NAA+PROBE: NORMAL

## 2018-09-12 ENCOUNTER — TELEPHONE (OUTPATIENT)
Dept: OBGYN CLINIC | Facility: CLINIC | Age: 28
End: 2018-09-12

## 2018-09-12 NOTE — TELEPHONE ENCOUNTER
I know nothing about abnormal bleeding  I gave her two Diflucan - to take on 9/7 and 9/10  It can take another week until she is feeling all better from her yeast - if she is not better in a week with all of this she should come back for a visit     She usually sees Shannon - I just saw her for an emergency visit

## 2018-09-12 NOTE — TELEPHONE ENCOUNTER
Spoke with Pt today via phone call  Pt informed that her reported symptoms were addressed with DASHAWN Mejia  Pt further informed that previously prescribed Diflucan can take another week until she is feeling better from yeast symptoms  Pt advised that if she is not feeling better in a week with all symptoms (yeast & vaginal "spotting"), she should schedule an office visit as recommended per DASHAWN Mejia  Reiterated to Pt that if her symptoms worsen or do not improve, to contact office

## 2018-09-12 NOTE — TELEPHONE ENCOUNTER
Dear Vivien Hamman:    Helena Beckford with Pt today via phone call  Pt informed that her recent throat culture result for herpes was negative, and her vaginal test result for chlamydia and gonorrhea were negative as well  Advised Pt that she should see her PCP for testing and treatment due to lab being unable to run throat culture for chlamydia and gonorrhea  Pt further advised to contact PCP for testing and treatment of strep throat due to lab being unable to run strep throat screen  Pt states she was recently prescribed Diflucan for yeast symptoms, however, is still experiencing vaginal burning and vaginal "spotting" since last Tuesday  Pt further states she notices "spots" of pinkish/reddish blood when wiping after voiding  Pt states she does not have any urinary symptoms at this time  Pt further states she does not have vaginal discharge nor vaginal odor  Pt is aware that she may receive a response by tomorrow  Please advise  Thank you!       Keiry Blanca MA

## 2018-09-12 NOTE — TELEPHONE ENCOUNTER
----- Message from Vicki Salazar PA-C sent at 9/12/2018 12:03 PM EDT -----  Please call Evette:   Her strept screen could not be run - if she is still having a sore throat she should see her PCP for testing and treatment  Her throat herpes culture is negative  Her vaginal chlamydia and gonorrhea testing is negative  Her throat chlamydia and gonorrhea - there was a lab error - it could not be run   If she wants this run, her PCP can do this at the same time as her strept culture

## 2018-09-18 ENCOUNTER — OFFICE VISIT (OUTPATIENT)
Dept: OBGYN CLINIC | Facility: CLINIC | Age: 28
End: 2018-09-18
Payer: COMMERCIAL

## 2018-09-18 VITALS — WEIGHT: 161 LBS | DIASTOLIC BLOOD PRESSURE: 60 MMHG | BODY MASS INDEX: 24.48 KG/M2 | SYSTOLIC BLOOD PRESSURE: 110 MMHG

## 2018-09-18 DIAGNOSIS — N89.8 VAGINAL DISCHARGE: Primary | ICD-10-CM

## 2018-09-18 PROCEDURE — 87480 CANDIDA DNA DIR PROBE: CPT | Performed by: PHYSICIAN ASSISTANT

## 2018-09-18 PROCEDURE — 87660 TRICHOMONAS VAGIN DIR PROBE: CPT | Performed by: PHYSICIAN ASSISTANT

## 2018-09-18 PROCEDURE — 87510 GARDNER VAG DNA DIR PROBE: CPT | Performed by: PHYSICIAN ASSISTANT

## 2018-09-18 PROCEDURE — 99213 OFFICE O/P EST LOW 20 MIN: CPT | Performed by: PHYSICIAN ASSISTANT

## 2018-09-18 NOTE — PROGRESS NOTES
Vernell Restrepo  1990    S:  25 y o  female here for a problem visit  She notes thin white vaginal discharge that she thinks smells funny but not particularly fishy  She notes burning at the introitus which seems worse since treatment with Diflucan  She is no longer sexually active - she says she learned a lot about him the first time she told him "no" in terms of having sex  She uses no soap in the vulvar area  She uses organic, natural products like shea butter, etc  And we discussed avoiding all products to the vulva  We discussed laundry soaps, dryer sheets, etc      She had negative gonorrhea and chlamydia testing two weeks ago  Past Medical History:   Diagnosis Date    Hemorrhoids      Family History   Problem Relation Age of Onset    No Known Problems Mother     No Known Problems Father     No Known Problems Sister     Uterine cancer Maternal Grandmother     Breast cancer Other     Colon cancer Neg Hx     Ovarian cancer Neg Hx     Cervical cancer Neg Hx      Social History     Social History    Marital status: Single     Spouse name: N/A    Number of children: N/A    Years of education: N/A     Social History Main Topics    Smoking status: Never Smoker    Smokeless tobacco: Never Used    Alcohol use Yes    Drug use: No    Sexual activity: Yes     Birth control/ protection: IUD     Other Topics Concern    Not on file     Social History Narrative    No narrative on file       O:  /60 (BP Location: Right arm, Patient Position: Sitting, Cuff Size: Standard)   Wt 73 kg (161 lb)   LMP 08/26/2018   BMI 24 48 kg/m²   She appears well and is in no distress  Abdomen is soft and nontender  External genitals are normal without lesions or rashes  Vagina is normal, scant white discharge noted, no bleeding  Cervix is normal, no lesions or discharge    Wet mount reveals no yeast, no clue cells, no trich, pH 3 5, neg whiff     A/P:  Vaginal discharge, likely physiologic   Wet mount is normal  Affirm sent for confirmation  Can add a probiotic in the interim

## 2018-09-20 LAB
CANDIDA RRNA VAG QL PROBE: NEGATIVE
G VAGINALIS RRNA GENITAL QL PROBE: NEGATIVE
T VAGINALIS RRNA GENITAL QL PROBE: NEGATIVE

## 2018-10-07 LAB — GP B STREP DNA SPEC QL NAA+PROBE: NORMAL

## 2018-10-29 ENCOUNTER — OFFICE VISIT (OUTPATIENT)
Dept: URGENT CARE | Facility: CLINIC | Age: 28
End: 2018-10-29
Payer: COMMERCIAL

## 2018-10-29 VITALS
HEIGHT: 68 IN | WEIGHT: 171.2 LBS | TEMPERATURE: 99.1 F | SYSTOLIC BLOOD PRESSURE: 102 MMHG | BODY MASS INDEX: 25.94 KG/M2 | HEART RATE: 81 BPM | OXYGEN SATURATION: 97 % | RESPIRATION RATE: 16 BRPM | DIASTOLIC BLOOD PRESSURE: 62 MMHG

## 2018-10-29 DIAGNOSIS — L02.419 AXILLARY ABSCESS: Primary | ICD-10-CM

## 2018-10-29 PROCEDURE — 99283 EMERGENCY DEPT VISIT LOW MDM: CPT | Performed by: PHYSICIAN ASSISTANT

## 2018-10-29 PROCEDURE — G0382 LEV 3 HOSP TYPE B ED VISIT: HCPCS | Performed by: PHYSICIAN ASSISTANT

## 2018-10-29 RX ORDER — SULFAMETHOXAZOLE AND TRIMETHOPRIM 800; 160 MG/1; MG/1
1 TABLET ORAL EVERY 12 HOURS SCHEDULED
Qty: 20 TABLET | Refills: 0 | Status: SHIPPED | OUTPATIENT
Start: 2018-10-29 | End: 2018-11-08

## 2018-10-29 NOTE — PROGRESS NOTES
[facility]        NAME: Sigurd Leventhal is a 25 y o  female  : 1990    MRN: [id number]  DATE: 2018  TIME: 6:19 PM    Assessment and Plan   Axillary abscess [L02 419]  1  Axillary abscess  sulfamethoxazole-trimethoprim (BACTRIM DS) 800-160 mg per tablet         Patient Instructions     1  Axillary abscess- no need for I&D at this time  -take Bactrim as directed  -apply warm compresses  -Tylenol/Motrin  -follow up with PCP for re-evaluation within 1 week    Go to the ER with worsening symptoms, worsening pain, fever, redness, drainage or any new concerns      Chief Complaint     Chief Complaint   Patient presents with    Mass     Pt states she found a hard lump in her R armpit 2 days ago followed by a lump in her L armpit  States they are hard and painful  Also states she has a h/o mrsa infection in her R underarm in which an abcess had to be lanced and drained, then cultured for MRSA         History of Present Illness       Patient is a 25-year-old female who presents today for evaluation of painful lumps in her bilateral armpits that have been present for the past couple of days  Patient 1st states that she noticed a painful area in her right armpit and then noticed some in her left armpit  Patient states that she has a history of MRSA in her right axillary area couple years ago that needed to be incised and drained  Patient rates her pain currently is a 4/10  No fevers or chills  Review of Systems   Review of Systems   Constitutional: Negative for chills and fever  Respiratory: Negative for shortness of breath  Cardiovascular: Negative for chest pain  Skin: Negative for rash  Neurological: Negative for numbness           Current Medications       Current Outpatient Prescriptions:     PARAGARD INTRAUTERINE COPPER IU, by Intrauterine route, Disp: , Rfl:     sulfamethoxazole-trimethoprim (BACTRIM DS) 800-160 mg per tablet, Take 1 tablet by mouth every 12 (twelve) hours for 10 days, Disp: 20 tablet, Rfl: 0    Current Allergies     Allergies as of 10/29/2018    (No Known Allergies)            The following portions of the patient's history were reviewed and updated as appropriate: allergies, current medications, past family history, past medical history, past social history, past surgical history and problem list      Past Medical History:   Diagnosis Date    Hemorrhoids     MRSA infection 2014    in R underarm       Past Surgical History:   Procedure Laterality Date    NO PAST SURGERIES         Family History   Problem Relation Age of Onset    No Known Problems Mother     No Known Problems Father     No Known Problems Sister     Uterine cancer Maternal Grandmother     Breast cancer Other     Colon cancer Neg Hx     Ovarian cancer Neg Hx     Cervical cancer Neg Hx          Medications have been verified  Objective   /62   Pulse 81   Temp 99 1 °F (37 3 °C) (Temporal)   Resp 16   Ht 5' 8" (1 727 m)   Wt 77 7 kg (171 lb 3 2 oz)   SpO2 97%   BMI 26 03 kg/m²        Physical Exam     Physical Exam   Constitutional: She is oriented to person, place, and time  She appears well-developed and well-nourished  No distress  Cardiovascular: Normal rate, regular rhythm and normal heart sounds  Pulmonary/Chest: Effort normal and breath sounds normal  She has no wheezes  She has no rales  Neurological: She is alert and oriented to person, place, and time  Skin: Skin is warm and dry  Lesion (small papular lesion in right axilla and a small papular lesion in left axilla  +tenderness upon palpation  No ertythema or fluctuance  No drainage ) noted  Psychiatric: She has a normal mood and affect  Nursing note and vitals reviewed

## 2018-10-29 NOTE — PATIENT INSTRUCTIONS
1  Axillary abscess- no need for I&D at this time  -take Bactrim as directed  -apply warm compresses  -Tylenol/Motrin  -follow up with PCP for re-evaluation within 1 week    Go to the ER with worsening symptoms, worsening pain, fever, redness, drainage or any new concerns    Abscess   AMBULATORY CARE:   An abscess  is an area under the skin where pus (infected fluid) collects  An abscess is often caused by bacteria, fungi or other germs that get into an open wound  You can get an abscess anywhere on your body  Common signs and symptoms of an abscess: You may have a swollen mass that is red and painful  Pus may leak out of the mass  The pus will be white or yellow and may smell bad  You may have redness and pain days before the mass appears  You may have a fever and chills if the infection spreads  Seek immediate care if:   · The area around your abscess becomes very painful, warm, or has red streaks  · You have a fever and chills  · Your heart is beating faster than usual      · You feel faint or confused  Contact your healthcare provider if:   · Your abscess gets bigger or does not get better  · Your abscess returns  · You have questions or concerns about your condition or care  Treatment for an abscess: Your healthcare provider may need to make a cut in the abscess to allow the pus to drain  You may need surgery to remove your abscess  You may  need any of the following:  · Antibiotics  help treat a bacterial infection  · Acetaminophen  decreases pain and fever  It is available without a doctor's order  Ask how much to take and how often to take it  Follow directions  Acetaminophen can cause liver damage if not taken correctly  · NSAIDs , such as ibuprofen, help decrease swelling, pain, and fever  This medicine is available with or without a doctor's order  NSAIDs can cause stomach bleeding or kidney problems in certain people   If you take blood thinner medicine, always ask your healthcare provider if NSAIDs are safe for you  Always read the medicine label and follow directions  · Take your medicine as directed  Contact your healthcare provider if you think your medicine is not helping or if you have side effects  Tell him or her if you are allergic to any medicine  Keep a list of the medicines, vitamins, and herbs you take  Include the amounts, and when and why you take them  Bring the list or the pill bottles to follow-up visits  Carry your medicine list with you in case of an emergency  Self-care:   · Apply a warm compress to your abscess  This will help it open and drain  Wet a washcloth in warm, but not hot, water  Apply the compress for 10 minutes  Repeat this 4 times each day  Do not  press on an abscess or try to open it with a needle  You may push the bacteria deeper or into your blood  · Do not share your clothes, towels, or sheets with anyone  This can spread the infection to others  · Wash your hands often  This can help prevent the spread of germs  Use soap and water or an alcohol-based hand rub  Care for your wound after it is drained:   · Care for your wound as directed  If your healthcare provider says it is okay, carefully remove the bandage and gauze packing  You may need to soak the gauze to get it out of your wound  Clean your wound and the area around it as directed  Dry the area and put on new, clean bandages  Change your bandages when they get wet or dirty  · Ask your healthcare provider how to change the gauze in your wound  Keep track of how many pieces of gauze are placed inside the wound  Do not put too much packing in the wound  Do not pack the gauze too tightly in your wound  Follow up with your healthcare provider in 1 to 3 days: You may need to have your packing removed or your bandage changed  Write down your questions so you remember to ask them during your visits     © 2017 [employer] Information is for End User's use only and may not be sold, redistributed or otherwise used for commercial purposes  All illustrations and images included in CareNotes® are the copyrighted property of [employer]  or [employer]  The above information is an  only  It is not intended as medical advice for individual conditions or treatments  Talk to your doctor, nurse or pharmacist before following any medical regimen to see if it is safe and effective for you  none

## 2018-10-30 DIAGNOSIS — L02.91 ABSCESS: Primary | ICD-10-CM

## 2018-10-30 RX ORDER — CEPHALEXIN 500 MG/1
500 CAPSULE ORAL EVERY 8 HOURS SCHEDULED
Qty: 21 CAPSULE | Refills: 0 | Status: SHIPPED | OUTPATIENT
Start: 2018-10-30 | End: 2018-11-06

## 2018-10-30 NOTE — PROGRESS NOTES
Patient called and she is having allergic reaction to the Bactrim  I told her to stop that and we will call in Keflex  If not improved follow up with PCP or worse go to the ER  She says she is not having trouble swallowing or breathing

## 2018-11-28 ENCOUNTER — OFFICE VISIT (OUTPATIENT)
Dept: URGENT CARE | Facility: CLINIC | Age: 28
End: 2018-11-28
Payer: COMMERCIAL

## 2018-11-28 VITALS
OXYGEN SATURATION: 98 % | TEMPERATURE: 98.2 F | HEART RATE: 75 BPM | SYSTOLIC BLOOD PRESSURE: 100 MMHG | BODY MASS INDEX: 24.71 KG/M2 | DIASTOLIC BLOOD PRESSURE: 66 MMHG | RESPIRATION RATE: 18 BRPM | HEIGHT: 68 IN | WEIGHT: 163 LBS

## 2018-11-28 DIAGNOSIS — M54.6 BILATERAL THORACIC BACK PAIN, UNSPECIFIED CHRONICITY: ICD-10-CM

## 2018-11-28 DIAGNOSIS — J06.9 UPPER RESPIRATORY TRACT INFECTION, UNSPECIFIED TYPE: Primary | ICD-10-CM

## 2018-11-28 PROCEDURE — 99283 EMERGENCY DEPT VISIT LOW MDM: CPT | Performed by: PHYSICIAN ASSISTANT

## 2018-11-28 PROCEDURE — G0382 LEV 3 HOSP TYPE B ED VISIT: HCPCS | Performed by: PHYSICIAN ASSISTANT

## 2018-11-28 RX ORDER — DEXTROMETHORPHAN HYDROBROMIDE AND PROMETHAZINE HYDROCHLORIDE 15; 6.25 MG/5ML; MG/5ML
5 SYRUP ORAL 4 TIMES DAILY PRN
Qty: 118 ML | Refills: 0 | Status: SHIPPED | OUTPATIENT
Start: 2018-11-28 | End: 2018-12-05

## 2018-11-29 NOTE — PROGRESS NOTES
[facility]        NAME: Leila Murrell is a 25 y o  female  : 1990    MRN: [id number]  DATE: 2018  TIME: 7:25 PM    Assessment and Plan   Upper respiratory tract infection, unspecified type [J06 9]  1  Upper respiratory tract infection, unspecified type  promethazine-dextromethorphan (PHENERGAN-DM) 6 25-15 mg/5 mL oral syrup   2  Bilateral thoracic back pain, unspecified chronicity           Patient Instructions     1  Upper respiratory infection  -02 is 98% and lungs are clear  -patient declines chest xray at this time  -Take cough medicine as directed  -Increase fluids  -Tylenol/motrin  -Salt H20 gargles/throat lozenges  -Saline nasal spray  -Try using humidifier at nighttime    2  Back pain  -Reproducible on exam therefore consistent with being muscular in origin  -Tylenol/motrin  -Apply heat    -Follow-up with PCP within 2-3 days if no improvement  -Go to ER with worsening symptoms, difficulty breathing, high fever, worsening back pain or any signs of distress    Chief Complaint     Chief Complaint   Patient presents with    Cough     x 3 days   Cold Like Symptoms         History of Present Illness       Patient is a 25-year-old female who presents today for evaluation of a cough and cold symptoms that have been present for the past 3 days  Patient also states that she has been having mid back pain for the past couple days as well  She states that the back pain becomes worse with movement and worse with coughing  Patient states that she has not tried taking anything over-the-counter for her symptoms  Patient states that her mom encouraged her to come here to be evaluated tonight  Review of Systems   Review of Systems   Constitutional: Negative for chills and fever  HENT: Negative for ear pain, rhinorrhea and sore throat  Respiratory: Positive for cough  Negative for shortness of breath and wheezing  Cardiovascular: Negative for chest pain  Musculoskeletal: Positive for back pain  Skin: Negative for rash  Neurological: Negative for weakness and numbness  Current Medications       Current Outpatient Prescriptions:     PARAGARD INTRAUTERINE COPPER IU, by Intrauterine route, Disp: , Rfl:     promethazine-dextromethorphan (PHENERGAN-DM) 6 25-15 mg/5 mL oral syrup, Take 5 mL by mouth 4 (four) times a day as needed for cough for up to 7 days, Disp: 118 mL, Rfl: 0    Current Allergies     Allergies as of 11/28/2018 - Reviewed 11/28/2018   Allergen Reaction Noted    Sulfa antibiotics Lip Swelling 11/28/2018            The following portions of the patient's history were reviewed and updated as appropriate: allergies, current medications, past family history, past medical history, past social history, past surgical history and problem list      Past Medical History:   Diagnosis Date    Hemorrhoids     MRSA infection 2014    in R underarm       Past Surgical History:   Procedure Laterality Date    NO PAST SURGERIES         Family History   Problem Relation Age of Onset    No Known Problems Mother     No Known Problems Father     No Known Problems Sister     Uterine cancer Maternal Grandmother     Breast cancer Other     Colon cancer Neg Hx     Ovarian cancer Neg Hx     Cervical cancer Neg Hx          Medications have been verified  Objective   /66 (BP Location: Left arm, Patient Position: Sitting)   Pulse 75   Temp 98 2 °F (36 8 °C) (Tympanic)   Resp 18   Ht 5' 8" (1 727 m)   Wt 73 9 kg (163 lb)   SpO2 98%   BMI 24 78 kg/m²        Physical Exam     Physical Exam   Constitutional: She is oriented to person, place, and time  She appears well-developed and well-nourished  No distress     HENT:   Right Ear: Tympanic membrane and external ear normal    Left Ear: Tympanic membrane and external ear normal    Nose: Nose normal    Mouth/Throat: Uvula is midline, oropharynx is clear and moist and mucous membranes are normal  Eyes: Pupils are equal, round, and reactive to light  Conjunctivae and EOM are normal    Neck: Normal range of motion  Neck supple  Cardiovascular: Normal rate, regular rhythm and normal heart sounds  Pulmonary/Chest: Effort normal and breath sounds normal  She has no wheezes  She has no rales  Musculoskeletal:        Back:    Lymphadenopathy:     She has no cervical adenopathy  Neurological: She is alert and oriented to person, place, and time  She has normal strength  No sensory deficit  Skin: Skin is warm and dry  No rash noted  Psychiatric: She has a normal mood and affect  Nursing note and vitals reviewed

## 2018-11-29 NOTE — PATIENT INSTRUCTIONS
1  Upper respiratory infection  -02 is 98% and lungs are clear  -patient declines chest xray at this time  -Take cough medicine as directed  -Increase fluids  -Tylenol/motrin  -Salt H20 gargles/throat lozenges  -Saline nasal spray  -Try using humidifier at nighttime    2  Back pain  -Reproducible on exam therefore consistent with being muscular in origin  -Tylenol/motrin  -Apply heat    -Follow-up with PCP within 2-3 days if no improvement  -Go to ER with worsening symptoms, difficulty breathing, high fever, worsening back pain or any signs of distress    Upper Respiratory Infection   AMBULATORY CARE:   An upper respiratory infection  is also called a common cold  It can affect your nose, throat, ears, and sinuses  Common signs and symptoms include the following:  Cold symptoms are usually worst for the first 3 to 5 days  You may have any of the following:  · Runny or stuffy nose    · Sneezing and coughing    · Sore throat or hoarseness    · Red, watery, and sore eyes    · Fatigue     · Chills and fever    · Headache, body aches, or sore muscles  Seek care immediately if:   · You have chest pain or trouble breathing  Contact your healthcare provider if:   · You have a fever over 102ºF (39°C)  · Your sore throat gets worse or you see white or yellow spots in your throat  · Your symptoms get worse after 3 to 5 days or your cold is not better in 14 days  · You have a rash anywhere on your skin  · You have large, tender lumps in your neck  · You have thick, green or yellow drainage from your nose  · You cough up thick yellow, green, or bloody mucus  · You have vomiting for more than 24 hours and cannot keep fluids down  · You have a bad earache  · You have questions or concerns about your condition or care  Treatment for a cold: There is no cure for the common cold  Colds are caused by viruses and do not get better with antibiotics  Most people get better in 7 to 14 days   You may continue to cough for 2 to 3 weeks  The following may help decrease your symptoms:  · Decongestants  help reduce nasal congestion and help you breathe more easily  If you take decongestant pills, they may make you feel restless or not able to sleep  Do not use decongestant sprays for more than a few days  · Cough suppressants  help reduce coughing  Ask your healthcare provider which type of cough medicine is best for you  · NSAIDs , such as ibuprofen, help decrease swelling, pain, and fever  NSAIDs can cause stomach bleeding or kidney problems in certain people  If you take blood thinner medicine, always ask your healthcare provider if NSAIDs are safe for you  Always read the medicine label and follow directions  · Acetaminophen  decreases pain and fever  It is available without a doctor's order  Ask how much to take and how often to take it  Follow directions  Read the labels of all other medicines you are using to see if they also contain acetaminophen, or ask your doctor or pharmacist  Acetaminophen can cause liver damage if not taken correctly  Do not use more than 4 grams (4,000 milligrams) total of acetaminophen in one day  Manage your cold:   · Rest as much as possible  Slowly start to do more each day  · Drink more liquids as directed  Liquids will help thin and loosen mucus so you can cough it up  Liquids will also help prevent dehydration  Liquids that help prevent dehydration include water, fruit juice, and broth  Do not drink liquids that contain caffeine  Caffeine can increase your risk for dehydration  Ask your healthcare provider how much liquid to drink each day  · Soothe a sore throat  Gargle with warm salt water  This helps your sore throat feel better  Make salt water by dissolving ¼ teaspoon salt in 1 cup warm water  You may also suck on hard candy or throat lozenges  You may use a sore throat spray  · Use a humidifier or vaporizer    Use a cool mist humidifier or a vaporizer to increase air moisture in your home  This may make it easier for you to breathe and help decrease your cough  · Use saline nasal drops as directed  These help relieve congestion  · Apply petroleum-based jelly around the outside of your nostrils  This can decrease irritation from blowing your nose  · Do not smoke  Nicotine and other chemicals in cigarettes and cigars can make your symptoms worse  They can also cause infections such as bronchitis or pneumonia  Ask your healthcare provider for information if you currently smoke and need help to quit  E-cigarettes or smokeless tobacco still contain nicotine  Talk to your healthcare provider before you use these products  Prevent spreading your cold to others:   · Try to stay away from other people during the first 2 to 3 days of your cold when it is more easily spread  · Do not share food or drinks  · Do not share hand towels with household members  · Wash your hands often, especially after you blow your nose  Turn away from other people and cover your mouth and nose with a tissue when you sneeze or cough  Follow up with your healthcare provider as directed:  Write down your questions so you remember to ask them during your visits  © 2017 [facility] Information is for End User's use only and may not be sold, redistributed or otherwise used for commercial purposes  All illustrations and images included in CareNotes® are the copyrighted property of [employer]  or [employer]  The above information is an  only  It is not intended as medical advice for individual conditions or treatments  Talk to your doctor, nurse or pharmacist before following any medical regimen to see if it is safe and effective for you

## 2018-12-11 ENCOUNTER — OFFICE VISIT (OUTPATIENT)
Dept: URGENT CARE | Facility: CLINIC | Age: 28
End: 2018-12-11
Payer: COMMERCIAL

## 2018-12-11 VITALS
DIASTOLIC BLOOD PRESSURE: 80 MMHG | RESPIRATION RATE: 18 BRPM | WEIGHT: 165 LBS | TEMPERATURE: 99.8 F | SYSTOLIC BLOOD PRESSURE: 116 MMHG | OXYGEN SATURATION: 99 % | HEART RATE: 94 BPM | BODY MASS INDEX: 25.01 KG/M2 | HEIGHT: 68 IN

## 2018-12-11 DIAGNOSIS — R30.0 DYSURIA: Primary | ICD-10-CM

## 2018-12-11 DIAGNOSIS — R10.9 FLANK PAIN: ICD-10-CM

## 2018-12-11 LAB
SL AMB  POCT GLUCOSE, UA: NEGATIVE
SL AMB LEUKOCYTE ESTERASE,UA: NEGATIVE
SL AMB POCT BILIRUBIN,UA: NEGATIVE
SL AMB POCT BLOOD,UA: ABNORMAL
SL AMB POCT CLARITY,UA: CLEAR
SL AMB POCT COLOR,UA: YELLOW
SL AMB POCT KETONES,UA: NEGATIVE
SL AMB POCT NITRITE,UA: NEGATIVE
SL AMB POCT PH,UA: 5
SL AMB POCT SPECIFIC GRAVITY,UA: 1.02
SL AMB POCT URINE PROTEIN: NEGATIVE
SL AMB POCT UROBILINOGEN: 0.2

## 2018-12-11 PROCEDURE — 87086 URINE CULTURE/COLONY COUNT: CPT | Performed by: PHYSICIAN ASSISTANT

## 2018-12-11 PROCEDURE — 81002 URINALYSIS NONAUTO W/O SCOPE: CPT | Performed by: PHYSICIAN ASSISTANT

## 2018-12-11 PROCEDURE — 99283 EMERGENCY DEPT VISIT LOW MDM: CPT | Performed by: PHYSICIAN ASSISTANT

## 2018-12-11 PROCEDURE — G0382 LEV 3 HOSP TYPE B ED VISIT: HCPCS | Performed by: PHYSICIAN ASSISTANT

## 2018-12-11 RX ORDER — CIPROFLOXACIN 500 MG/1
500 TABLET, FILM COATED ORAL EVERY 12 HOURS SCHEDULED
Qty: 6 TABLET | Refills: 0 | Status: SHIPPED | OUTPATIENT
Start: 2018-12-11 | End: 2018-12-14

## 2018-12-12 LAB — BACTERIA UR CULT: NORMAL

## 2018-12-12 NOTE — PATIENT INSTRUCTIONS
She has blood on urine dip but reported leukocytes on dip at home  We can treat for UTI with Cipro  Due to dysuria  This may be renal stone  I discussed with her will check a culture  If symptoms worsen she should emergency room  She has no  CVA tenderness on exam   Follow up with PCP in 3-5 days  Proceed to  ER if symptoms worsen

## 2018-12-12 NOTE — PROGRESS NOTES
[facility]        NAME: Cary Broussard is a 25 y o  female  : 1990    MRN: [id number]  DATE: 2018  TIME: 7:56 PM    Assessment and Plan   Dysuria [R30 0]  1  Dysuria  ciprofloxacin (CIPRO) 500 mg tablet   2  Flank pain  Urine culture    POCT urine dip         Patient Instructions       She has blood on urine dip but reported leukocytes on dip at home  We can treat for UTI with Cipro  Due to dysuria  This may be renal stone  I discussed with her will check a culture  If symptoms worsen she should emergency room  She has no  CVA tenderness on exam   Follow up with PCP in 3-5 days  Proceed to  ER if symptoms worsen  Chief Complaint     Chief Complaint   Patient presents with    Flank Pain     x24 hrs  L sidied     Painful Urination     burning     Diarrhea     x48 hrs     Vomiting     1 episode today 1 episode yesterday          History of Present Illness         23-year-old female presents with left-sided flank pain starting yesterday worsening today  She has some burning when she urinated yesterday  She used a home test strips for her urine and says that her white blood cells on it  She was nauseous but did not vomit  No fever  No blood in her urine that she can see  No history of  Urinary surgery  The pain in her back does not change with movement  Does not radiate          Review of Systems   Review of Systems      Current Medications       Current Outpatient Prescriptions:     ciprofloxacin (CIPRO) 500 mg tablet, Take 1 tablet (500 mg total) by mouth every 12 (twelve) hours for 3 days, Disp: 6 tablet, Rfl: 0    PARAGARD INTRAUTERINE COPPER IU, by Intrauterine route, Disp: , Rfl:     Current Allergies     Allergies as of 2018 - Reviewed 2018   Allergen Reaction Noted    Sulfa antibiotics Lip Swelling 2018            The following portions of the patient's history were reviewed and updated as appropriate: allergies, current medications, past family history, past medical history, past social history, past surgical history and problem list      Past Medical History:   Diagnosis Date    Hemorrhoids     MRSA infection 2014    in R underarm       Past Surgical History:   Procedure Laterality Date    NO PAST SURGERIES         Family History   Problem Relation Age of Onset    No Known Problems Mother     No Known Problems Father     No Known Problems Sister     Uterine cancer Maternal Grandmother     Breast cancer Other     Colon cancer Neg Hx     Ovarian cancer Neg Hx     Cervical cancer Neg Hx          Medications have been verified  Objective   /80   Pulse 94   Temp 99 8 °F (37 7 °C) (Temporal)   Resp 18   Ht 5' 8" (1 727 m)   Wt 74 8 kg (165 lb)   SpO2 99%   BMI 25 09 kg/m²        Physical Exam     Physical Exam   Constitutional: She appears well-developed and well-nourished  Cardiovascular: Normal rate and regular rhythm  Pulmonary/Chest: Effort normal and breath sounds normal    Abdominal: There is tenderness in the left lower quadrant  There is no rigidity, no rebound, no guarding and no CVA tenderness  Musculoskeletal:     Lumbar spine nontender full range of motion         Urine dip with blood

## 2019-03-28 ENCOUNTER — OFFICE VISIT (OUTPATIENT)
Dept: URGENT CARE | Facility: CLINIC | Age: 29
End: 2019-03-28
Payer: COMMERCIAL

## 2019-03-28 VITALS
BODY MASS INDEX: 24.86 KG/M2 | HEART RATE: 75 BPM | RESPIRATION RATE: 16 BRPM | WEIGHT: 164 LBS | TEMPERATURE: 98.8 F | SYSTOLIC BLOOD PRESSURE: 92 MMHG | DIASTOLIC BLOOD PRESSURE: 58 MMHG | OXYGEN SATURATION: 97 % | HEIGHT: 68 IN

## 2019-03-28 DIAGNOSIS — S61.210A LACERATION OF RIGHT INDEX FINGER WITHOUT FOREIGN BODY WITHOUT DAMAGE TO NAIL, INITIAL ENCOUNTER: Primary | ICD-10-CM

## 2019-03-28 PROCEDURE — 99213 OFFICE O/P EST LOW 20 MIN: CPT | Performed by: PHYSICIAN ASSISTANT

## 2019-03-28 PROCEDURE — 99283 EMERGENCY DEPT VISIT LOW MDM: CPT | Performed by: PHYSICIAN ASSISTANT

## 2019-03-28 PROCEDURE — G0382 LEV 3 HOSP TYPE B ED VISIT: HCPCS | Performed by: PHYSICIAN ASSISTANT

## 2019-03-28 NOTE — PATIENT INSTRUCTIONS
No sutures required  Pressure applied and hemostasis achieved  Closely monitor for signs of infection  Dressing change daily  Patient states that she is up to date on her tetanus vaccine  Proceed to  ER if symptoms worsen  Laceration   WHAT YOU NEED TO KNOW:   A laceration is an injury to the skin and the soft tissue underneath it  Lacerations happen when you are cut or hit by something  They can happen anywhere on the body  DISCHARGE INSTRUCTIONS:   Return to the emergency department if:   · You have heavy bleeding or bleeding that does not stop after 10 minutes of holding firm, direct pressure over the wound  · Your wound opens up  Contact your healthcare provider if:   · You have a fever or chills  · Your laceration is red, warm, or swollen  · You have red streaks on your skin coming from your wound  · You have white or yellow drainage from the wound that smells bad  · You have pain that gets worse, even after treatment  · You have questions or concerns about your condition or care  Medicines:   · Prescription pain medicine  may be given  Ask how to take this medicine safely  · Antibiotics  help treat or prevent a bacterial infection  · Take your medicine as directed  Contact your healthcare provider if you think your medicine is not helping or if you have side effects  Tell him or her if you are allergic to any medicine  Keep a list of the medicines, vitamins, and herbs you take  Include the amounts, and when and why you take them  Bring the list or the pill bottles to follow-up visits  Carry your medicine list with you in case of an emergency  Care for your wound as directed:   · Do not get your wound wet  until your healthcare provider says it is okay  Do not soak your wound in water  Do not go swimming until your healthcare provider says it is okay  Carefully wash the wound with soap and water  Gently pat the area dry or allow it to air dry       · Change your bandages  when they get wet, dirty, or after washing  Apply new, clean bandages as directed  Do not apply elastic bandages or tape too tight  Do not put powders or lotions over your incision  · Apply antibiotic ointment as directed  Your healthcare provider may give you antibiotic ointment to put over your wound if you have stitches  If you have strips of tape over your incision, let them dry up and fall off on their own  If they do not fall off within 14 days, gently remove them  If you have glue over your wound, do not remove or pick at it  If your glue comes off, do not replace it with glue that you have at home  · Check your wound every day for signs of infection such as swelling, redness, or pus  Self-care:   · Apply ice  on your wound for 15 to 20 minutes every hour or as directed  Use an ice pack, or put crushed ice in a plastic bag  Cover it with a towel  Ice helps prevent tissue damage and decreases swelling and pain  · Use a splint as directed  A splint will decrease movement and stress on your wound  It may help it heal faster  A splint may be used for lacerations over joints or areas of your body that bend  Ask your healthcare provider how to apply and remove a splint  · Decrease scarring of your wound  by applying ointments as directed  Do not apply ointments until your healthcare provider says it is okay  You may need to wait until your wound is healed  Ask which ointment to buy and how often to use it  After your wound is healed, use sunscreen over the area when you are out in the sun  You should do this for at least 6 months to 1 year after your injury  Follow up with your healthcare provider as directed: You may need to follow up in 24 to 48 hours to have your wound checked for infection  You will need to return in 3 to 14 days if you have stitches or staples so they can be removed   Care for your wound as directed to prevent infection and help it heal  Write down your questions so you remember to ask them during your visits  © 2017 [employer] Information is for End User's use only and may not be sold, redistributed or otherwise used for commercial purposes  All illustrations and images included in CareNotes® are the copyrighted property of [employer]  or [employer]  The above information is an  only  It is not intended as medical advice for individual conditions or treatments  Talk to your doctor, nurse or pharmacist before following any medical regimen to see if it is safe and effective for you

## 2019-03-28 NOTE — PROGRESS NOTES
[facility]        NAME: Ana Stephen is a 25 y o  female  : 1990    MRN: [id number]  DATE: 2019  TIME: 7:39 PM    Assessment and Plan   Laceration of right index finger without foreign body without damage to nail, initial encounter [S61 210A]  1  Laceration of right index finger without foreign body without damage to nail, initial encounter           Patient Instructions   No sutures required  No foreign bodies detected-the patient does not want a x-ray performed  Pressure applied and hemostasis achieved  Closely monitor for signs of infection  Dressing change daily  Patient states that she is up to date on her tetanus vaccine  Proceed to  ER if symptoms worsen  Chief Complaint     Chief Complaint   Patient presents with    Laceration     right hand, cut on glass washing dishes         History of Present Illness   The patient is a 78-year-old female who presents with a right hand laceration that occurred today  She states that she was washing dishes and cut her hand on glass  She tried holding pressure, however, the wound continues to bleed  She is able to move her fingers without difficulty  Negative numbness or tingling  She is up-to-date on her tetanus vaccine  HPI    Review of Systems   Review of Systems   Constitutional: Negative for chills and fever  Skin: Positive for wound (Right hand laceration)  All other systems reviewed and are negative          Current Medications       Current Outpatient Medications:     PARAGARD INTRAUTERINE COPPER IU, by Intrauterine route, Disp: , Rfl:     Current Allergies     Allergies as of 2019 - Reviewed 2019   Allergen Reaction Noted    Sulfa antibiotics Lip Swelling 2018            The following portions of the patient's history were reviewed and updated as appropriate: allergies, current medications, past family history, past medical history, past social history, past surgical history and problem list      Past Medical History:   Diagnosis Date    Hemorrhoids     MRSA infection 2014    in R underarm       Past Surgical History:   Procedure Laterality Date    NO PAST SURGERIES         Family History   Problem Relation Age of Onset    No Known Problems Mother     No Known Problems Father     No Known Problems Sister     Uterine cancer Maternal Grandmother     Breast cancer Other     Colon cancer Neg Hx     Ovarian cancer Neg Hx     Cervical cancer Neg Hx          Medications have been verified  Objective   BP 92/58 (BP Location: Left arm, Patient Position: Sitting, Cuff Size: Standard)   Pulse 75   Temp 98 8 °F (37 1 °C) (Tympanic)   Resp 16   Ht 5' 8" (1 727 m)   Wt 74 4 kg (164 lb)   SpO2 97%   BMI 24 94 kg/m²        Physical Exam     Physical Exam   Constitutional: She appears well-developed and well-nourished  No distress  Musculoskeletal:        Right hand: She exhibits tenderness and laceration  She exhibits normal range of motion, no bony tenderness, normal two-point discrimination, normal capillary refill, no deformity and no swelling  Normal sensation noted  Decreased sensation is not present in the ulnar distribution, is not present in the medial distribution and is not present in the radial distribution  Normal strength noted  She exhibits no finger abduction, no thumb/finger opposition and no wrist extension trouble  Hands:  Skin: She is not diaphoretic  Nursing note and vitals reviewed

## 2019-05-07 LAB — HSV1 IGG SER IA-ACNC: NORMAL

## 2019-05-10 ENCOUNTER — OFFICE VISIT (OUTPATIENT)
Dept: OBGYN CLINIC | Facility: MEDICAL CENTER | Age: 29
End: 2019-05-10
Payer: COMMERCIAL

## 2019-05-10 VITALS
HEIGHT: 68 IN | DIASTOLIC BLOOD PRESSURE: 69 MMHG | SYSTOLIC BLOOD PRESSURE: 100 MMHG | BODY MASS INDEX: 24.86 KG/M2 | HEART RATE: 73 BPM | WEIGHT: 164 LBS

## 2019-05-10 DIAGNOSIS — M25.862 PATELLOFEMORAL DYSFUNCTION OF LEFT KNEE: Primary | ICD-10-CM

## 2019-05-10 DIAGNOSIS — M23.92 ACUTE INTERNAL DERANGEMENT OF LEFT KNEE: ICD-10-CM

## 2019-05-10 DIAGNOSIS — M22.2X1 PATELLOFEMORAL DISORDER OF RIGHT KNEE: ICD-10-CM

## 2019-05-10 PROCEDURE — 99213 OFFICE O/P EST LOW 20 MIN: CPT | Performed by: ORTHOPAEDIC SURGERY

## 2019-05-10 RX ORDER — FLUCONAZOLE 150 MG/1
TABLET ORAL
Refills: 0 | COMMUNITY
Start: 2019-05-01 | End: 2019-06-21

## 2019-05-15 ENCOUNTER — EVALUATION (OUTPATIENT)
Dept: PHYSICAL THERAPY | Age: 29
End: 2019-05-15
Payer: COMMERCIAL

## 2019-05-15 DIAGNOSIS — M25.869 PATELLOFEMORAL DYSFUNCTION, UNSPECIFIED LATERALITY: Primary | ICD-10-CM

## 2019-05-15 DIAGNOSIS — M25.561 PAIN IN BOTH KNEES, UNSPECIFIED CHRONICITY: ICD-10-CM

## 2019-05-15 DIAGNOSIS — M25.562 PAIN IN BOTH KNEES, UNSPECIFIED CHRONICITY: ICD-10-CM

## 2019-05-15 DIAGNOSIS — M25.862 PATELLOFEMORAL DYSFUNCTION OF LEFT KNEE: ICD-10-CM

## 2019-05-15 PROCEDURE — 97161 PT EVAL LOW COMPLEX 20 MIN: CPT | Performed by: PHYSICAL THERAPIST

## 2019-05-15 PROCEDURE — 97110 THERAPEUTIC EXERCISES: CPT | Performed by: PHYSICAL THERAPIST

## 2019-05-20 ENCOUNTER — APPOINTMENT (OUTPATIENT)
Dept: PHYSICAL THERAPY | Age: 29
End: 2019-05-20
Payer: COMMERCIAL

## 2019-05-21 ENCOUNTER — TELEPHONE (OUTPATIENT)
Dept: OBGYN CLINIC | Facility: CLINIC | Age: 29
End: 2019-05-21

## 2019-05-21 ENCOUNTER — OFFICE VISIT (OUTPATIENT)
Dept: PHYSICAL THERAPY | Age: 29
End: 2019-05-21
Payer: COMMERCIAL

## 2019-05-21 DIAGNOSIS — M25.862 PATELLOFEMORAL DYSFUNCTION OF LEFT KNEE: ICD-10-CM

## 2019-05-21 DIAGNOSIS — M25.869 PATELLOFEMORAL DYSFUNCTION, UNSPECIFIED LATERALITY: Primary | ICD-10-CM

## 2019-05-21 DIAGNOSIS — M25.562 PAIN IN BOTH KNEES, UNSPECIFIED CHRONICITY: ICD-10-CM

## 2019-05-21 DIAGNOSIS — M25.561 PAIN IN BOTH KNEES, UNSPECIFIED CHRONICITY: ICD-10-CM

## 2019-05-21 PROCEDURE — 97110 THERAPEUTIC EXERCISES: CPT | Performed by: PHYSICAL THERAPIST

## 2019-05-22 ENCOUNTER — OFFICE VISIT (OUTPATIENT)
Dept: PHYSICAL THERAPY | Age: 29
End: 2019-05-22
Payer: COMMERCIAL

## 2019-05-22 DIAGNOSIS — M25.869 PATELLOFEMORAL DYSFUNCTION, UNSPECIFIED LATERALITY: Primary | ICD-10-CM

## 2019-05-22 DIAGNOSIS — M25.561 PAIN IN BOTH KNEES, UNSPECIFIED CHRONICITY: ICD-10-CM

## 2019-05-22 DIAGNOSIS — M25.562 PAIN IN BOTH KNEES, UNSPECIFIED CHRONICITY: ICD-10-CM

## 2019-05-22 DIAGNOSIS — M25.862 PATELLOFEMORAL DYSFUNCTION OF LEFT KNEE: ICD-10-CM

## 2019-05-22 PROCEDURE — 97110 THERAPEUTIC EXERCISES: CPT

## 2019-05-29 ENCOUNTER — OFFICE VISIT (OUTPATIENT)
Dept: PHYSICAL THERAPY | Age: 29
End: 2019-05-29
Payer: COMMERCIAL

## 2019-05-29 DIAGNOSIS — M25.862 PATELLOFEMORAL DYSFUNCTION OF LEFT KNEE: ICD-10-CM

## 2019-05-29 DIAGNOSIS — M25.562 PAIN IN BOTH KNEES, UNSPECIFIED CHRONICITY: ICD-10-CM

## 2019-05-29 DIAGNOSIS — M25.561 PAIN IN BOTH KNEES, UNSPECIFIED CHRONICITY: ICD-10-CM

## 2019-05-29 DIAGNOSIS — M25.869 PATELLOFEMORAL DYSFUNCTION, UNSPECIFIED LATERALITY: Primary | ICD-10-CM

## 2019-05-29 PROCEDURE — 97110 THERAPEUTIC EXERCISES: CPT | Performed by: PHYSICAL THERAPIST

## 2019-05-31 ENCOUNTER — OFFICE VISIT (OUTPATIENT)
Dept: PHYSICAL THERAPY | Age: 29
End: 2019-05-31
Payer: COMMERCIAL

## 2019-05-31 DIAGNOSIS — M25.562 PAIN IN BOTH KNEES, UNSPECIFIED CHRONICITY: ICD-10-CM

## 2019-05-31 DIAGNOSIS — M25.869 PATELLOFEMORAL DYSFUNCTION, UNSPECIFIED LATERALITY: Primary | ICD-10-CM

## 2019-05-31 DIAGNOSIS — M25.561 PAIN IN BOTH KNEES, UNSPECIFIED CHRONICITY: ICD-10-CM

## 2019-05-31 DIAGNOSIS — M25.862 PATELLOFEMORAL DYSFUNCTION OF LEFT KNEE: ICD-10-CM

## 2019-05-31 PROCEDURE — 97110 THERAPEUTIC EXERCISES: CPT | Performed by: PHYSICAL THERAPIST

## 2019-06-21 ENCOUNTER — OFFICE VISIT (OUTPATIENT)
Dept: OBGYN CLINIC | Facility: MEDICAL CENTER | Age: 29
End: 2019-06-21
Payer: COMMERCIAL

## 2019-06-21 VITALS
WEIGHT: 164 LBS | DIASTOLIC BLOOD PRESSURE: 65 MMHG | HEART RATE: 81 BPM | SYSTOLIC BLOOD PRESSURE: 106 MMHG | HEIGHT: 68 IN | BODY MASS INDEX: 24.86 KG/M2

## 2019-06-21 DIAGNOSIS — M22.2X1 PATELLOFEMORAL DISORDER OF RIGHT KNEE: ICD-10-CM

## 2019-06-21 DIAGNOSIS — M25.862 PATELLOFEMORAL DYSFUNCTION OF LEFT KNEE: Primary | ICD-10-CM

## 2019-06-21 DIAGNOSIS — M25.562 CHRONIC PAIN OF BOTH KNEES: ICD-10-CM

## 2019-06-21 DIAGNOSIS — M25.561 CHRONIC PAIN OF BOTH KNEES: ICD-10-CM

## 2019-06-21 DIAGNOSIS — G89.29 CHRONIC PAIN OF BOTH KNEES: ICD-10-CM

## 2019-06-21 PROCEDURE — 99213 OFFICE O/P EST LOW 20 MIN: CPT | Performed by: ORTHOPAEDIC SURGERY

## 2019-10-07 ENCOUNTER — OFFICE VISIT (OUTPATIENT)
Dept: OBGYN CLINIC | Facility: CLINIC | Age: 29
End: 2019-10-07
Payer: COMMERCIAL

## 2019-10-07 VITALS
WEIGHT: 157 LBS | BODY MASS INDEX: 23.79 KG/M2 | SYSTOLIC BLOOD PRESSURE: 114 MMHG | HEIGHT: 68 IN | DIASTOLIC BLOOD PRESSURE: 68 MMHG

## 2019-10-07 DIAGNOSIS — N92.6 IRREGULAR UTERINE BLEEDING: Primary | ICD-10-CM

## 2019-10-07 DIAGNOSIS — Z11.3 SCREENING FOR STDS (SEXUALLY TRANSMITTED DISEASES): ICD-10-CM

## 2019-10-07 DIAGNOSIS — N89.8 VAGINAL ODOR: ICD-10-CM

## 2019-10-07 PROCEDURE — 99213 OFFICE O/P EST LOW 20 MIN: CPT | Performed by: NURSE PRACTITIONER

## 2019-10-07 PROCEDURE — 87491 CHLMYD TRACH DNA AMP PROBE: CPT | Performed by: NURSE PRACTITIONER

## 2019-10-07 PROCEDURE — 87591 N.GONORRHOEAE DNA AMP PROB: CPT | Performed by: NURSE PRACTITIONER

## 2019-10-07 NOTE — PROGRESS NOTES
Diagnoses and all orders for this visit:    Irregular uterine bleeding  -     US pelvis complete w transvaginal; Future    Vaginal odor    Screening for STDs (sexually transmitted diseases)  -     Chlamydia/GC amplified DNA by PCR        Call if no symptom improvement, all questions answered, return for annual 3 wks  Pleasant 30 y o  here for vaginal complaints of odor and spotting x 3 weeks  She has a new partner x 4 months  She denies any treatments tried  Denies recent antibiotic use  Denies douching  Denies fever, pelvic pain or dyspareunia  Paragard for BCM  Overdue for her Pap smear  Past Medical History:   Diagnosis Date    Hemorrhoids     MRSA infection 2014    in R underarm     Past Surgical History:   Procedure Laterality Date    NO PAST SURGERIES       Social History     Tobacco Use    Smoking status: Never Smoker    Smokeless tobacco: Never Used   Substance Use Topics    Alcohol use: Yes    Drug use: No     Family History   Problem Relation Age of Onset    No Known Problems Mother     No Known Problems Father     No Known Problems Sister     Uterine cancer Maternal Grandmother     Breast cancer Other     Colon cancer Neg Hx     Ovarian cancer Neg Hx     Cervical cancer Neg Hx        Current Outpatient Medications:     PARAGARD INTRAUTERINE COPPER IU, by Intrauterine route, Disp: , Rfl:     Allergies   Allergen Reactions    Sulfa Antibiotics Lip Swelling     OB History    Para Term  AB Living   1   0   1 0   SAB TAB Ectopic Multiple Live Births   1       0      # Outcome Date GA Lbr Miguel/2nd Weight Sex Delivery Anes PTL Lv   1 SAB                Vitals:    10/07/19 1602   BP: 114/68   BP Location: Right arm   Patient Position: Sitting   Weight: 71 2 kg (157 lb)   Height: 5' 8" (1 727 m)     Body mass index is 23 87 kg/m²  Review of Systems   Constitutional: Negative for chills, fatigue, fever and unexpected weight change     Respiratory: Negative for shortness of breath  Gastrointestinal: Negative for anal bleeding, blood in stool, constipation and diarrhea  Genitourinary: Negative for difficulty urinating, dysuria and hematuria  Physical Exam   Constitutional: She appears well-developed and well-nourished  No distress  Alert and oriented  HENT: atraumatic  Head: Normocephalic  Neck: Normal range of motion  Neck supple  Pulmonary: Effort normal   Abdominal: Soft  Pelvic exam was performed with patient supine  No labial fusion  There is no rash, tenderness, lesion or injury on the right labia  There is no rash, tenderness, lesion or injury on the left labia  Urethral meatus does not show any tenderness, inflammation or discharge  Palpation of midline bladder without pain or discomfort  Uterus is not deviated, not enlarged, not fixed and not tender  Cervix exhibits no motion tenderness, no discharge and no friability  Right adnexum displays no mass, no tenderness and no fullness  Left adnexum displays no mass, no tenderness and no fullness  No erythema or tenderness in the vagina  No foreign body in the vagina  No signs of injury around the vagina  Vaginal discharge found  Perineum and anus without areas of injury  No lesions noted or swelling  Lymphadenopathy:        Right: No inguinal adenopathy present  Left: No inguinal adenopathy present       IUD strings possibly palpated, not seen today but pt was uncomfortable during the exam   WHIFF POSITIVE

## 2019-10-08 LAB
C TRACH DNA SPEC QL NAA+PROBE: NEGATIVE
N GONORRHOEA DNA SPEC QL NAA+PROBE: NEGATIVE

## 2020-06-12 ENCOUNTER — HOSPITAL ENCOUNTER (EMERGENCY)
Facility: HOSPITAL | Age: 30
Discharge: HOME | End: 2020-06-12
Attending: EMERGENCY MEDICINE | Admitting: EMERGENCY MEDICINE
Payer: COMMERCIAL

## 2020-06-12 ENCOUNTER — APPOINTMENT (EMERGENCY)
Dept: RADIOLOGY | Facility: HOSPITAL | Age: 30
End: 2020-06-12
Payer: COMMERCIAL

## 2020-06-12 VITALS
HEART RATE: 90 BPM | BODY MASS INDEX: 22.85 KG/M2 | WEIGHT: 150.79 LBS | HEIGHT: 68 IN | DIASTOLIC BLOOD PRESSURE: 55 MMHG | TEMPERATURE: 98.8 F | OXYGEN SATURATION: 100 % | SYSTOLIC BLOOD PRESSURE: 107 MMHG | RESPIRATION RATE: 18 BRPM

## 2020-06-12 DIAGNOSIS — R07.89 CHEST TIGHTNESS: Primary | ICD-10-CM

## 2020-06-12 LAB
ALBUMIN SERPL BCP-MCNC: 3.5 G/DL (ref 3.5–5)
ALP SERPL-CCNC: 57 U/L (ref 46–116)
ALT SERPL W P-5'-P-CCNC: 14 U/L (ref 12–78)
ANION GAP SERPL CALCULATED.3IONS-SCNC: 6 MMOL/L (ref 4–13)
AST SERPL W P-5'-P-CCNC: 14 U/L (ref 5–45)
BASOPHILS # BLD AUTO: 0.06 THOUSANDS/ΜL (ref 0–0.1)
BASOPHILS NFR BLD AUTO: 1 % (ref 0–1)
BILIRUB SERPL-MCNC: 0.4 MG/DL (ref 0.2–1)
BUN SERPL-MCNC: 7 MG/DL (ref 5–25)
CALCIUM SERPL-MCNC: 8.3 MG/DL (ref 8.3–10.1)
CHLORIDE SERPL-SCNC: 106 MMOL/L (ref 100–108)
CO2 SERPL-SCNC: 28 MMOL/L (ref 21–32)
CREAT SERPL-MCNC: 0.67 MG/DL (ref 0.6–1.3)
EOSINOPHIL # BLD AUTO: 0.21 THOUSAND/ΜL (ref 0–0.61)
EOSINOPHIL NFR BLD AUTO: 3 % (ref 0–6)
ERYTHROCYTE [DISTWIDTH] IN BLOOD BY AUTOMATED COUNT: 12 % (ref 11.6–15.1)
GFR SERPL CREATININE-BSD FRML MDRD: 117 ML/MIN/1.73SQ M
GLUCOSE SERPL-MCNC: 118 MG/DL (ref 65–140)
HCT VFR BLD AUTO: 37.2 % (ref 34.8–46.1)
HGB BLD-MCNC: 12.3 G/DL (ref 11.5–15.4)
IMM GRANULOCYTES # BLD AUTO: 0.02 THOUSAND/UL (ref 0–0.2)
IMM GRANULOCYTES NFR BLD AUTO: 0 % (ref 0–2)
LYMPHOCYTES # BLD AUTO: 2.79 THOUSANDS/ΜL (ref 0.6–4.47)
LYMPHOCYTES NFR BLD AUTO: 34 % (ref 14–44)
MCH RBC QN AUTO: 32 PG (ref 26.8–34.3)
MCHC RBC AUTO-ENTMCNC: 33.1 G/DL (ref 31.4–37.4)
MCV RBC AUTO: 97 FL (ref 82–98)
MONOCYTES # BLD AUTO: 0.75 THOUSAND/ΜL (ref 0.17–1.22)
MONOCYTES NFR BLD AUTO: 9 % (ref 4–12)
NEUTROPHILS # BLD AUTO: 4.27 THOUSANDS/ΜL (ref 1.85–7.62)
NEUTS SEG NFR BLD AUTO: 53 % (ref 43–75)
NRBC BLD AUTO-RTO: 0 /100 WBCS
PLATELET # BLD AUTO: 254 THOUSANDS/UL (ref 149–390)
PMV BLD AUTO: 10.2 FL (ref 8.9–12.7)
POTASSIUM SERPL-SCNC: 3.5 MMOL/L (ref 3.5–5.3)
PROT SERPL-MCNC: 6.6 G/DL (ref 6.4–8.2)
RBC # BLD AUTO: 3.84 MILLION/UL (ref 3.81–5.12)
SARS-COV-2 RNA RESP QL NAA+PROBE: NEGATIVE
SODIUM SERPL-SCNC: 140 MMOL/L (ref 136–145)
TROPONIN I SERPL-MCNC: <0.02 NG/ML
WBC # BLD AUTO: 8.1 THOUSAND/UL (ref 4.31–10.16)

## 2020-06-12 PROCEDURE — 36415 COLL VENOUS BLD VENIPUNCTURE: CPT | Performed by: EMERGENCY MEDICINE

## 2020-06-12 PROCEDURE — 99284 EMERGENCY DEPT VISIT MOD MDM: CPT | Performed by: EMERGENCY MEDICINE

## 2020-06-12 PROCEDURE — 84484 ASSAY OF TROPONIN QUANT: CPT | Performed by: EMERGENCY MEDICINE

## 2020-06-12 PROCEDURE — 85025 COMPLETE CBC W/AUTO DIFF WBC: CPT | Performed by: EMERGENCY MEDICINE

## 2020-06-12 PROCEDURE — 71045 X-RAY EXAM CHEST 1 VIEW: CPT

## 2020-06-12 PROCEDURE — 87635 SARS-COV-2 COVID-19 AMP PRB: CPT | Performed by: EMERGENCY MEDICINE

## 2020-06-12 PROCEDURE — 80053 COMPREHEN METABOLIC PANEL: CPT | Performed by: EMERGENCY MEDICINE

## 2020-06-12 PROCEDURE — 99285 EMERGENCY DEPT VISIT HI MDM: CPT

## 2020-06-12 PROCEDURE — 93005 ELECTROCARDIOGRAM TRACING: CPT

## 2020-06-15 LAB
ATRIAL RATE: 75 BPM
P AXIS: 64 DEGREES
PR INTERVAL: 194 MS
QRS AXIS: 10 DEGREES
QRSD INTERVAL: 94 MS
QT INTERVAL: 386 MS
QTC INTERVAL: 431 MS
T WAVE AXIS: 70 DEGREES
VENTRICULAR RATE: 75 BPM

## 2020-06-15 PROCEDURE — 93010 ELECTROCARDIOGRAM REPORT: CPT | Performed by: INTERNAL MEDICINE

## 2020-10-06 ENCOUNTER — OFFICE VISIT (OUTPATIENT)
Dept: FAMILY MEDICINE CLINIC | Facility: CLINIC | Age: 30
End: 2020-10-06
Payer: COMMERCIAL

## 2020-10-06 VITALS
WEIGHT: 147.2 LBS | HEIGHT: 68 IN | SYSTOLIC BLOOD PRESSURE: 110 MMHG | OXYGEN SATURATION: 97 % | HEART RATE: 85 BPM | DIASTOLIC BLOOD PRESSURE: 60 MMHG | TEMPERATURE: 98.4 F | BODY MASS INDEX: 22.31 KG/M2

## 2020-10-06 DIAGNOSIS — Z13.1 SCREENING FOR DIABETES MELLITUS: ICD-10-CM

## 2020-10-06 DIAGNOSIS — K29.60 REFLUX GASTRITIS: ICD-10-CM

## 2020-10-06 DIAGNOSIS — Z76.89 ENCOUNTER TO ESTABLISH CARE: Primary | ICD-10-CM

## 2020-10-06 DIAGNOSIS — R00.0 RACING HEART BEAT: ICD-10-CM

## 2020-10-06 DIAGNOSIS — R06.02 SHORTNESS OF BREATH: ICD-10-CM

## 2020-10-06 DIAGNOSIS — E55.9 VITAMIN D DEFICIENCY: ICD-10-CM

## 2020-10-06 DIAGNOSIS — Z28.21 INFLUENZA VACCINATION DECLINED: ICD-10-CM

## 2020-10-06 DIAGNOSIS — Z13.29 SCREENING FOR THYROID DISORDER: ICD-10-CM

## 2020-10-06 DIAGNOSIS — Z13.0 SCREENING FOR DEFICIENCY ANEMIA: ICD-10-CM

## 2020-10-06 DIAGNOSIS — Z13.220 SCREENING FOR HYPERLIPIDEMIA: ICD-10-CM

## 2020-10-06 PROCEDURE — 99203 OFFICE O/P NEW LOW 30 MIN: CPT | Performed by: NURSE PRACTITIONER

## 2020-10-06 PROCEDURE — 3725F SCREEN DEPRESSION PERFORMED: CPT | Performed by: NURSE PRACTITIONER

## 2020-10-06 PROCEDURE — 1036F TOBACCO NON-USER: CPT | Performed by: NURSE PRACTITIONER

## 2020-10-06 RX ORDER — OMEPRAZOLE 20 MG/1
20 CAPSULE, DELAYED RELEASE ORAL DAILY
Qty: 30 CAPSULE | Refills: 2 | Status: SHIPPED | OUTPATIENT
Start: 2020-10-06 | End: 2021-04-14 | Stop reason: ALTCHOICE

## 2020-10-06 RX ORDER — RIBOFLAVIN (VITAMIN B2) 100 MG
100 TABLET ORAL DAILY
COMMUNITY
End: 2022-07-25 | Stop reason: ALTCHOICE

## 2020-10-27 ENCOUNTER — APPOINTMENT (EMERGENCY)
Dept: RADIOLOGY | Facility: HOSPITAL | Age: 30
End: 2020-10-27
Payer: COMMERCIAL

## 2020-10-27 ENCOUNTER — HOSPITAL ENCOUNTER (EMERGENCY)
Facility: HOSPITAL | Age: 30
Discharge: HOME | End: 2020-10-27
Attending: EMERGENCY MEDICINE | Admitting: EMERGENCY MEDICINE
Payer: COMMERCIAL

## 2020-10-27 ENCOUNTER — HOSPITAL ENCOUNTER (OUTPATIENT)
Dept: ULTRASOUND IMAGING | Facility: HOSPITAL | Age: 30
Discharge: HOME | End: 2020-10-27
Payer: COMMERCIAL

## 2020-10-27 VITALS
HEIGHT: 68 IN | SYSTOLIC BLOOD PRESSURE: 122 MMHG | OXYGEN SATURATION: 100 % | TEMPERATURE: 98.3 F | BODY MASS INDEX: 22.73 KG/M2 | HEART RATE: 59 BPM | RESPIRATION RATE: 28 BRPM | DIASTOLIC BLOOD PRESSURE: 86 MMHG | WEIGHT: 150 LBS

## 2020-10-27 DIAGNOSIS — R06.02 SOB (SHORTNESS OF BREATH): Primary | ICD-10-CM

## 2020-10-27 LAB
ALBUMIN SERPL BCP-MCNC: 3.8 G/DL (ref 3.5–5)
ALP SERPL-CCNC: 52 U/L (ref 46–116)
ALT SERPL W P-5'-P-CCNC: 15 U/L (ref 12–78)
ANION GAP SERPL CALCULATED.3IONS-SCNC: 7 MMOL/L (ref 4–13)
APTT PPP: 31 SECONDS (ref 23–37)
AST SERPL W P-5'-P-CCNC: 13 U/L (ref 5–45)
ATRIAL RATE: 57 BPM
B-HCG SERPL-ACNC: <2 MIU/ML
BASOPHILS # BLD AUTO: 0.06 THOUSANDS/ΜL (ref 0–0.1)
BASOPHILS NFR BLD AUTO: 1 % (ref 0–1)
BILIRUB DIRECT SERPL-MCNC: 0.11 MG/DL (ref 0–0.2)
BILIRUB SERPL-MCNC: 0.5 MG/DL (ref 0.2–1)
BUN SERPL-MCNC: 7 MG/DL (ref 5–25)
CALCIUM SERPL-MCNC: 8.6 MG/DL (ref 8.3–10.1)
CHLORIDE SERPL-SCNC: 106 MMOL/L (ref 100–108)
CO2 SERPL-SCNC: 25 MMOL/L (ref 21–32)
CREAT SERPL-MCNC: 0.67 MG/DL (ref 0.6–1.3)
D DIMER PPP FEU-MCNC: <0.27 UG/ML FEU
EOSINOPHIL # BLD AUTO: 0.17 THOUSAND/ΜL (ref 0–0.61)
EOSINOPHIL NFR BLD AUTO: 3 % (ref 0–6)
ERYTHROCYTE [DISTWIDTH] IN BLOOD BY AUTOMATED COUNT: 12 % (ref 11.6–15.1)
GFR SERPL CREATININE-BSD FRML MDRD: 117 ML/MIN/1.73SQ M
GLUCOSE SERPL-MCNC: 90 MG/DL (ref 65–140)
HCT VFR BLD AUTO: 39.9 % (ref 34.8–46.1)
HGB BLD-MCNC: 13.2 G/DL (ref 11.5–15.4)
IMM GRANULOCYTES # BLD AUTO: 0.01 THOUSAND/UL (ref 0–0.2)
IMM GRANULOCYTES NFR BLD AUTO: 0 % (ref 0–2)
INR PPP: 1 (ref 0.84–1.19)
LIPASE SERPL-CCNC: 83 U/L (ref 73–393)
LYMPHOCYTES # BLD AUTO: 2.15 THOUSANDS/ΜL (ref 0.6–4.47)
LYMPHOCYTES NFR BLD AUTO: 34 % (ref 14–44)
MAGNESIUM SERPL-MCNC: 2.3 MG/DL (ref 1.6–2.6)
MCH RBC QN AUTO: 31.5 PG (ref 26.8–34.3)
MCHC RBC AUTO-ENTMCNC: 33.1 G/DL (ref 31.4–37.4)
MCV RBC AUTO: 95 FL (ref 82–98)
MONOCYTES # BLD AUTO: 0.66 THOUSAND/ΜL (ref 0.17–1.22)
MONOCYTES NFR BLD AUTO: 10 % (ref 4–12)
NEUTROPHILS # BLD AUTO: 3.3 THOUSANDS/ΜL (ref 1.85–7.62)
NEUTS SEG NFR BLD AUTO: 52 % (ref 43–75)
NRBC BLD AUTO-RTO: 0 /100 WBCS
P AXIS: 46 DEGREES
PLATELET # BLD AUTO: 258 THOUSANDS/UL (ref 149–390)
PMV BLD AUTO: 10.1 FL (ref 8.9–12.7)
POTASSIUM SERPL-SCNC: 3.5 MMOL/L (ref 3.5–5.3)
PR INTERVAL: 176 MS
PROT SERPL-MCNC: 7 G/DL (ref 6.4–8.2)
PROTHROMBIN TIME: 13.4 SECONDS (ref 11.6–14.5)
QRS AXIS: 14 DEGREES
QRSD INTERVAL: 98 MS
QT INTERVAL: 430 MS
QTC INTERVAL: 418 MS
RBC # BLD AUTO: 4.19 MILLION/UL (ref 3.81–5.12)
SODIUM SERPL-SCNC: 138 MMOL/L (ref 136–145)
T WAVE AXIS: 73 DEGREES
TROPONIN I SERPL-MCNC: <0.02 NG/ML
TSH SERPL DL<=0.05 MIU/L-ACNC: 1.43 UIU/ML (ref 0.36–3.74)
VENTRICULAR RATE: 57 BPM
WBC # BLD AUTO: 6.35 THOUSAND/UL (ref 4.31–10.16)

## 2020-10-27 PROCEDURE — 93010 ELECTROCARDIOGRAM REPORT: CPT | Performed by: INTERNAL MEDICINE

## 2020-10-27 PROCEDURE — 84484 ASSAY OF TROPONIN QUANT: CPT | Performed by: EMERGENCY MEDICINE

## 2020-10-27 PROCEDURE — 83735 ASSAY OF MAGNESIUM: CPT | Performed by: EMERGENCY MEDICINE

## 2020-10-27 PROCEDURE — 99285 EMERGENCY DEPT VISIT HI MDM: CPT | Performed by: EMERGENCY MEDICINE

## 2020-10-27 PROCEDURE — 71046 X-RAY EXAM CHEST 2 VIEWS: CPT

## 2020-10-27 PROCEDURE — 84702 CHORIONIC GONADOTROPIN TEST: CPT | Performed by: EMERGENCY MEDICINE

## 2020-10-27 PROCEDURE — 85025 COMPLETE CBC W/AUTO DIFF WBC: CPT | Performed by: EMERGENCY MEDICINE

## 2020-10-27 PROCEDURE — 85730 THROMBOPLASTIN TIME PARTIAL: CPT | Performed by: EMERGENCY MEDICINE

## 2020-10-27 PROCEDURE — 36415 COLL VENOUS BLD VENIPUNCTURE: CPT | Performed by: EMERGENCY MEDICINE

## 2020-10-27 PROCEDURE — 93005 ELECTROCARDIOGRAM TRACING: CPT

## 2020-10-27 PROCEDURE — 85610 PROTHROMBIN TIME: CPT | Performed by: EMERGENCY MEDICINE

## 2020-10-27 PROCEDURE — 85379 FIBRIN DEGRADATION QUANT: CPT | Performed by: EMERGENCY MEDICINE

## 2020-10-27 PROCEDURE — 84443 ASSAY THYROID STIM HORMONE: CPT | Performed by: EMERGENCY MEDICINE

## 2020-10-27 PROCEDURE — 83690 ASSAY OF LIPASE: CPT | Performed by: EMERGENCY MEDICINE

## 2020-10-27 PROCEDURE — 76705 ECHO EXAM OF ABDOMEN: CPT

## 2020-10-27 PROCEDURE — 80076 HEPATIC FUNCTION PANEL: CPT | Performed by: EMERGENCY MEDICINE

## 2020-10-27 PROCEDURE — 99285 EMERGENCY DEPT VISIT HI MDM: CPT

## 2020-10-27 PROCEDURE — 80048 BASIC METABOLIC PNL TOTAL CA: CPT | Performed by: EMERGENCY MEDICINE

## 2020-11-03 ENCOUNTER — HOSPITAL ENCOUNTER (OUTPATIENT)
Dept: NON INVASIVE DIAGNOSTICS | Facility: HOSPITAL | Age: 30
Discharge: HOME | End: 2020-11-03
Payer: COMMERCIAL

## 2020-11-03 ENCOUNTER — LAB (OUTPATIENT)
Dept: LAB | Facility: HOSPITAL | Age: 30
End: 2020-11-03
Payer: COMMERCIAL

## 2020-11-03 DIAGNOSIS — Z13.1 SCREENING FOR DIABETES MELLITUS: ICD-10-CM

## 2020-11-03 DIAGNOSIS — R00.0 RACING HEART BEAT: ICD-10-CM

## 2020-11-03 DIAGNOSIS — Z13.29 SCREENING FOR THYROID DISORDER: ICD-10-CM

## 2020-11-03 DIAGNOSIS — R06.02 SOB (SHORTNESS OF BREATH): ICD-10-CM

## 2020-11-03 DIAGNOSIS — Z13.220 SCREENING FOR HYPERLIPIDEMIA: ICD-10-CM

## 2020-11-03 DIAGNOSIS — Z13.0 SCREENING FOR DEFICIENCY ANEMIA: ICD-10-CM

## 2020-11-03 DIAGNOSIS — E55.9 VITAMIN D DEFICIENCY: ICD-10-CM

## 2020-11-03 DIAGNOSIS — R06.02 SHORTNESS OF BREATH: ICD-10-CM

## 2020-11-03 LAB
25(OH)D3 SERPL-MCNC: 34.2 NG/ML (ref 30–100)
ALBUMIN SERPL BCP-MCNC: 3.7 G/DL (ref 3.5–5)
ALP SERPL-CCNC: 53 U/L (ref 46–116)
ALT SERPL W P-5'-P-CCNC: 15 U/L (ref 12–78)
ANION GAP SERPL CALCULATED.3IONS-SCNC: 5 MMOL/L (ref 4–13)
AST SERPL W P-5'-P-CCNC: 16 U/L (ref 5–45)
BASOPHILS # BLD AUTO: 0.06 THOUSANDS/ΜL (ref 0–0.1)
BASOPHILS NFR BLD AUTO: 1 % (ref 0–1)
BILIRUB SERPL-MCNC: 0.6 MG/DL (ref 0.2–1)
BUN SERPL-MCNC: 8 MG/DL (ref 5–25)
CALCIUM SERPL-MCNC: 8.6 MG/DL (ref 8.3–10.1)
CHLORIDE SERPL-SCNC: 104 MMOL/L (ref 100–108)
CHOLEST SERPL-MCNC: 156 MG/DL (ref 50–200)
CO2 SERPL-SCNC: 29 MMOL/L (ref 21–32)
CREAT SERPL-MCNC: 0.59 MG/DL (ref 0.6–1.3)
EOSINOPHIL # BLD AUTO: 0.19 THOUSAND/ΜL (ref 0–0.61)
EOSINOPHIL NFR BLD AUTO: 4 % (ref 0–6)
ERYTHROCYTE [DISTWIDTH] IN BLOOD BY AUTOMATED COUNT: 12 % (ref 11.6–15.1)
GFR SERPL CREATININE-BSD FRML MDRD: 122 ML/MIN/1.73SQ M
GLUCOSE P FAST SERPL-MCNC: 87 MG/DL (ref 65–99)
HCT VFR BLD AUTO: 42.2 % (ref 34.8–46.1)
HDLC SERPL-MCNC: 57 MG/DL
HGB BLD-MCNC: 13.5 G/DL (ref 11.5–15.4)
IMM GRANULOCYTES # BLD AUTO: 0.01 THOUSAND/UL (ref 0–0.2)
IMM GRANULOCYTES NFR BLD AUTO: 0 % (ref 0–2)
LDLC SERPL CALC-MCNC: 85 MG/DL (ref 0–100)
LYMPHOCYTES # BLD AUTO: 1.51 THOUSANDS/ΜL (ref 0.6–4.47)
LYMPHOCYTES NFR BLD AUTO: 33 % (ref 14–44)
MCH RBC QN AUTO: 31.3 PG (ref 26.8–34.3)
MCHC RBC AUTO-ENTMCNC: 32 G/DL (ref 31.4–37.4)
MCV RBC AUTO: 98 FL (ref 82–98)
MONOCYTES # BLD AUTO: 0.43 THOUSAND/ΜL (ref 0.17–1.22)
MONOCYTES NFR BLD AUTO: 9 % (ref 4–12)
NEUTROPHILS # BLD AUTO: 2.41 THOUSANDS/ΜL (ref 1.85–7.62)
NEUTS SEG NFR BLD AUTO: 53 % (ref 43–75)
NONHDLC SERPL-MCNC: 99 MG/DL
NRBC BLD AUTO-RTO: 0 /100 WBCS
PLATELET # BLD AUTO: 281 THOUSANDS/UL (ref 149–390)
PMV BLD AUTO: 10.1 FL (ref 8.9–12.7)
POTASSIUM SERPL-SCNC: 3.9 MMOL/L (ref 3.5–5.3)
PROT SERPL-MCNC: 7 G/DL (ref 6.4–8.2)
RBC # BLD AUTO: 4.31 MILLION/UL (ref 3.81–5.12)
SODIUM SERPL-SCNC: 138 MMOL/L (ref 136–145)
TRIGL SERPL-MCNC: 69 MG/DL
TSH SERPL DL<=0.05 MIU/L-ACNC: 0.86 UIU/ML (ref 0.36–3.74)
WBC # BLD AUTO: 4.61 THOUSAND/UL (ref 4.31–10.16)

## 2020-11-03 PROCEDURE — 85025 COMPLETE CBC W/AUTO DIFF WBC: CPT

## 2020-11-03 PROCEDURE — 93226 XTRNL ECG REC<48 HR SCAN A/R: CPT

## 2020-11-03 PROCEDURE — 80061 LIPID PANEL: CPT

## 2020-11-03 PROCEDURE — 84443 ASSAY THYROID STIM HORMONE: CPT

## 2020-11-03 PROCEDURE — 36415 COLL VENOUS BLD VENIPUNCTURE: CPT

## 2020-11-03 PROCEDURE — 80053 COMPREHEN METABOLIC PANEL: CPT

## 2020-11-03 PROCEDURE — 82306 VITAMIN D 25 HYDROXY: CPT

## 2020-11-03 PROCEDURE — 93225 XTRNL ECG REC<48 HRS REC: CPT

## 2020-11-03 PROCEDURE — 86769 SARS-COV-2 COVID-19 ANTIBODY: CPT

## 2020-11-04 LAB — SARS-COV-2 IGG+IGM SERPL QL IA: NORMAL

## 2020-11-05 ENCOUNTER — HOSPITAL ENCOUNTER (OUTPATIENT)
Dept: PULMONOLOGY | Facility: HOSPITAL | Age: 30
Discharge: HOME | End: 2020-11-05
Payer: COMMERCIAL

## 2020-11-05 DIAGNOSIS — R06.02 SHORTNESS OF BREATH: ICD-10-CM

## 2020-11-05 PROCEDURE — 94010 BREATHING CAPACITY TEST: CPT

## 2020-11-05 PROCEDURE — 94729 DIFFUSING CAPACITY: CPT

## 2020-11-05 PROCEDURE — 94727 GAS DIL/WSHOT DETER LNG VOL: CPT

## 2020-11-05 PROCEDURE — 94727 GAS DIL/WSHOT DETER LNG VOL: CPT | Performed by: INTERNAL MEDICINE

## 2020-11-05 PROCEDURE — 94010 BREATHING CAPACITY TEST: CPT | Performed by: INTERNAL MEDICINE

## 2020-11-05 PROCEDURE — 94729 DIFFUSING CAPACITY: CPT | Performed by: INTERNAL MEDICINE

## 2020-11-05 PROCEDURE — 93227 XTRNL ECG REC<48 HR R&I: CPT | Performed by: INTERNAL MEDICINE

## 2020-11-05 PROCEDURE — 94760 N-INVAS EAR/PLS OXIMETRY 1: CPT

## 2021-02-09 ENCOUNTER — TELEPHONE (OUTPATIENT)
Dept: SURGICAL ONCOLOGY | Facility: CLINIC | Age: 31
End: 2021-02-09

## 2021-02-09 ENCOUNTER — APPOINTMENT (OUTPATIENT)
Dept: LAB | Facility: CLINIC | Age: 31
End: 2021-02-09
Payer: COMMERCIAL

## 2021-02-09 ENCOUNTER — OFFICE VISIT (OUTPATIENT)
Dept: OBGYN CLINIC | Age: 31
End: 2021-02-09
Payer: COMMERCIAL

## 2021-02-09 VITALS
SYSTOLIC BLOOD PRESSURE: 108 MMHG | BODY MASS INDEX: 22.88 KG/M2 | HEIGHT: 68 IN | WEIGHT: 151 LBS | DIASTOLIC BLOOD PRESSURE: 82 MMHG

## 2021-02-09 DIAGNOSIS — N64.52 DISCHARGE FROM BREAST: Primary | ICD-10-CM

## 2021-02-09 DIAGNOSIS — N64.52 DISCHARGE FROM BREAST: ICD-10-CM

## 2021-02-09 PROBLEM — N89.8 VAGINAL ODOR: Status: RESOLVED | Noted: 2019-10-07 | Resolved: 2021-02-09

## 2021-02-09 LAB — PROLACTIN SERPL-MCNC: 9 NG/ML

## 2021-02-09 PROCEDURE — 36415 COLL VENOUS BLD VENIPUNCTURE: CPT

## 2021-02-09 PROCEDURE — 99213 OFFICE O/P EST LOW 20 MIN: CPT | Performed by: NURSE PRACTITIONER

## 2021-02-09 PROCEDURE — 84146 ASSAY OF PROLACTIN: CPT

## 2021-02-09 RX ORDER — MELATONIN
1000 DAILY
COMMUNITY
End: 2022-07-25 | Stop reason: ALTCHOICE

## 2021-02-09 RX ORDER — FERROUS SULFATE 325(65) MG
325 TABLET ORAL
COMMUNITY
End: 2022-07-25 | Stop reason: ALTCHOICE

## 2021-02-09 NOTE — TELEPHONE ENCOUNTER
New Patient Breast Form   Patient Details:     Arnie Way     1990     [id number]     Background Information:   [address] starts by opening a telephone encounter and gathering the following information   Who is calling to schedule and relationship? self   Referring Provider Clifford Charlton   To which speciality is the referral? Surgical Oncology   Reason for Visit?  discharge from breast   Tumor Type? Is there a confimed diagnosis from biopsy/tissue reviewed by Pathology? No   Date of Tissue Diagnosis (If done outside of [facility] please obtain report and slides)    Is patient aware of diagnosis, and who made them aware? (If no tissue diagnosis, please stop and discuss with Navigator prior to scheduling)     When was the diagnosis made? Were outside slides requested (If biopsy done eternally, obtain reports and slides for internal review)  No   Have you had any imaging or labs done? Yes  2/12/21   If YES, when and  where was the blood work done? (If outside of [facility] obtain records)     Was the patient told to bring a disk? No   Are records in EPIC? Yes   Is there a personal history of cancer? No    (If YES please list type and YR diagnosed)  No   If patient has a prior history of breast cancer were old records obtained? No   Is there a family history of cancer? Great aunt   (If YES please list type)  No   Does the patient smoke or Vape? no    If yes, how many packs or cartridges per day? Scheduling Information:   Preferred [place]  Any   Are there any days the patient cannot be seen? Miscellaneous:  Pt will call back after imaging on 2/12/21  So that we can properly schedule this appt  After completing the above information, please route to finance, nurse navigation and clinical trials for review

## 2021-02-09 NOTE — PROGRESS NOTES
Assessment/Plan:    No problem-specific Assessment & Plan notes found for this encounter  Diagnoses and all orders for this visit:    Discharge from breast  -     Ambulatory referral to Breast Clinic; Future  -     US BREAST BILATERAL LIMITED (DIAGNOSTIC); Future  -     Prolactin; Future      Return for annual in 2 wks  Subjective:      Patient ID: Sal Cedillo is a 26 y o  female  Pleasant 26 y o  here for bilateral breast discharge which she noticed for the past 10 yrs off and on  Her breast feels hot and she has "tension" in it  She states she started having bloody discharge from her right nipple since 2/5/2021 but it was usually a milky, cream color  She states it is NOT associated with pain  Denies skin changes, redness to the breast or fevers  She has a family history of breast cancer with a great aunt  She feels the discharge is worsening  She is not breastfeeding  The following portions of the patient's history were reviewed and updated as appropriate:   She  has a past medical history of Acid reflux, Hemorrhoids, and MRSA infection (2014)  She   Patient Active Problem List    Diagnosis Date Noted    Irregular uterine bleeding 10/07/2019    Patellofemoral dysfunction of left knee 05/10/2019    Pelvic pain 05/16/2018    Chronic pain of both knees 05/04/2018    Acute internal derangement of left knee 05/04/2018     She  has a past surgical history that includes No past surgeries  Her family history includes ADD / ADHD in her sister; Breast cancer in her other; Deep vein thrombosis in her mother; Hypertension in her father; Scoliosis in her sister; Uterine cancer in her maternal grandmother  She  reports that she has never smoked  She has never used smokeless tobacco  She reports current alcohol use  She reports that she does not use drugs    Current Outpatient Medications   Medication Sig Dispense Refill    cholecalciferol (VITAMIN D3) 1,000 units tablet Take 1,000 Units by mouth daily      ferrous sulfate 325 (65 Fe) mg tablet Take 325 mg by mouth daily with breakfast      PARAGARD INTRAUTERINE COPPER IU by Intrauterine route      Ascorbic Acid (vitamin C) 100 MG tablet Take 100 mg by mouth daily      omeprazole (PriLOSEC) 20 mg delayed release capsule Take 1 capsule (20 mg total) by mouth daily (Patient not taking: Reported on 2021) 30 capsule 2     No current facility-administered medications for this visit  She is allergic to sulfa antibiotics  OB History    Para Term  AB Living   1   0   1 0   SAB TAB Ectopic Multiple Live Births   1       0      # Outcome Date GA Lbr Miguel/2nd Weight Sex Delivery Anes PTL Lv   1 SAB                  Review of Systems   Constitutional: Negative for chills, fatigue, fever and unexpected weight change  HENT: Negative for mouth sores and trouble swallowing  Gastrointestinal: Negative for abdominal distention, abdominal pain and nausea  Genitourinary: Negative for difficulty urinating, pelvic pain and vaginal discharge  Musculoskeletal: Negative for back pain  Skin: Negative for color change, rash and wound  Psychiatric/Behavioral: Negative for confusion, self-injury and suicidal ideas  Objective:      /82 (BP Location: Left arm, Patient Position: Sitting, Cuff Size: Standard)   Ht 5' 8" (1 727 m)   Wt 68 5 kg (151 lb)   LMP 2021 (Approximate)   Breastfeeding No   BMI 22 96 kg/m²          Physical Exam  Constitutional:       General: She is not in acute distress  Appearance: She is well-developed  Pulmonary:      Effort: Pulmonary effort is normal       Comments: Right nipple expressed blood on compression and Left nipple expressed creamy yellowish discharge on compression  Chest:      Breasts: Breasts are symmetrical          Right: Nipple discharge present  No inverted nipple, mass, skin change or tenderness  Left: Nipple discharge present   No inverted nipple, mass, skin change or tenderness  Neurological:      Mental Status: She is alert  Psychiatric:         Behavior: Behavior is cooperative

## 2021-02-09 NOTE — PATIENT INSTRUCTIONS
Nipple Discharge   WHAT YOU NEED TO KNOW:   What do I need to know about nipple discharge? Nipple discharge is fluid from one or both nipples  Fluid may come out on its own or when you touch your breast or nipple  The fluid may be white, yellow, green, pink, watery, or bloody  Nipple discharge is normal in a woman that is pregnant or breastfeeding  A woman should contact her healthcare provider if she has nipple discharge when she is not pregnant or breast feeding  A man should always  contact his healthcare provider if he has nipple discharge  What signs and symptoms may happen with nipple discharge? · Fever or body aches    · Breast pain or discomfort    · A swollen breast or nipple    · A change in breast shape    · A change in nipple shape or color    · Swollen lymph nodes under your arms    What causes nipple discharge? · A growth or abnormal cells in the milk ducts    · Too much of the hormone that causes your breasts to make milk    · Breast cancer    · Infection or injury to the chest    · Touching the breast or nipple during sex    · Rubbing of clothing against the nipple during vigorous exercise    · Certain medicines such as oral contraceptives, antidepressants, or blood pressure medicines    How is nipple discharge diagnosed? Tell your healthcare provider about your symptoms  He may feel your breast for lumps or swelling  Tell him about any medicines that you take  You may need any of the following tests to find the cause of your nipple discharge:  · Blood tests  may be done to check your hormone levels or for infection or pregnancy  · Mammogram, ultrasound, or MRI  pictures may show a tumor or infection in your breast      · A ductoscopy  uses a scope to look inside your milk ducts for tumors or infection  · A breast biopsy  may be done to test for cancer or infection  Breast tissue is removed and sent to the lab for tests  How is nipple discharge treated?   Treatment will depend on the cause of your nipple discharge  Medicines that cause nipple discharge may be stopped or changed  Medicines may be given to control your hormone levels, decrease pain, or treat an infection  Avoid touching your nipples or breast  This may stop or decrease discharge from your nipples  Wear a tight fitting bra during exercise to decrease rubbing on your nipples  When should I contact my healthcare provider? · You have a fever  · You have more breast discharge or it changes color  · You have new or worsening breast pain  · You have questions or concerns about your condition or care  CARE AGREEMENT:   You have the right to help plan your care  Learn about your health condition and how it may be treated  Discuss treatment options with your healthcare providers to decide what care you want to receive  You always have the right to refuse treatment  The above information is an  only  It is not intended as medical advice for individual conditions or treatments  Talk to your doctor, nurse or pharmacist before following any medical regimen to see if it is safe and effective for you  © Copyright [address] Information is for End User's use only and may not be sold, redistributed or otherwise used for commercial purposes   All illustrations and images included in CareNotes® are the copyrighted property of [employer]  or [address] [place] [address]

## 2021-02-12 ENCOUNTER — HOSPITAL ENCOUNTER (OUTPATIENT)
Dept: ULTRASOUND IMAGING | Facility: CLINIC | Age: 31
Discharge: HOME | End: 2021-02-12
Payer: COMMERCIAL

## 2021-02-12 ENCOUNTER — HOSPITAL ENCOUNTER (OUTPATIENT)
Dept: MAMMOGRAPHY | Facility: CLINIC | Age: 31
Discharge: HOME | End: 2021-02-12
Payer: COMMERCIAL

## 2021-02-12 VITALS — HEIGHT: 68 IN | WEIGHT: 149 LBS | BODY MASS INDEX: 22.58 KG/M2

## 2021-02-12 DIAGNOSIS — N64.52 DISCHARGE FROM BREAST: ICD-10-CM

## 2021-02-12 DIAGNOSIS — N64.52 NIPPLE DISCHARGE: ICD-10-CM

## 2021-02-12 PROCEDURE — 77066 DX MAMMO INCL CAD BI: CPT

## 2021-02-12 PROCEDURE — G0279 TOMOSYNTHESIS, MAMMO: HCPCS

## 2021-02-12 PROCEDURE — 76642 ULTRASOUND BREAST LIMITED: CPT

## 2021-02-15 ENCOUNTER — TELEPHONE (OUTPATIENT)
Dept: OBGYN CLINIC | Facility: CLINIC | Age: 31
End: 2021-02-15

## 2021-02-15 ENCOUNTER — TELEPHONE (OUTPATIENT)
Dept: HEMATOLOGY ONCOLOGY | Facility: CLINIC | Age: 31
End: 2021-02-15

## 2021-02-15 NOTE — TELEPHONE ENCOUNTER
----- Message from Vibha Wheeler, [address] sent at 2/15/2021  7:46 AM EST -----  Please advise pt her breast imaging seems benign and 6 mos f/u was recommended but I would like her to see a breast specialist for a consult bc they are recommending an MRI  Has she made an appt yet?  Thanks

## 2021-02-15 NOTE — TELEPHONE ENCOUNTER
New Patient Breast Form   Patient Details:     Maribel Tran     1990     [id number]     Background Information:   [address] starts by opening a telephone encounter and gathering the following information   Who is calling to schedule and relationship? Se;f   Referring Provider Funmi   To which speciality is the referral? Surgical Oncology   Reason for Visit? new   Tumor Type?  bloody nipple discharge, calcifications   Is there a confimed diagnosis from biopsy/tissue reviewed by Pathology? No   Date of Tissue Diagnosis (If done outside of [facility] please obtain report and slides)    Is patient aware of diagnosis, and who made them aware? Yes, Arnaldo Cochran   (If no tissue diagnosis, please stop and discuss with Navigator prior to scheduling)     When was the diagnosis made? 2/2021   Were outside slides requested (If biopsy done eternally, obtain reports and slides for internal review)     Have you had any imaging or labs done? Yes   If YES, when and  where was the blood work done? Imaging at [address] [place] [address] no labs    (If outside of [facility] obtain records)     Was the patient told to bring a disk? Are records in EPIC? Yes   Is there a personal history of cancer? No    (If YES please list type and YR diagnosed)     If patient has a prior history of breast cancer were old records obtained? Is there a family history of cancer? Yes    (If YES please list type)  uterine, breast, colon   Does the patient smoke or Vape? no    If yes, how many packs or cartridges per day? Scheduling Information:   [facility]   Are there any days the patient cannot be seen? Miscellaneous:   After completing the above information, please route to finance, nurse navigation and clinical trials for review

## 2021-02-17 NOTE — TELEPHONE ENCOUNTER
I tried multiple times to reach pt mandi was unable to get through to leave her a mssg  I called her mother and gave her details of new appointment  She will relay this info to [facility]

## 2021-02-17 NOTE — TELEPHONE ENCOUNTER
Per Josefa Martinez, pts mother called requesting sooner appt  She had some follow up cancel for Monday and was able to move things aroung to accomodate a new patient appt  I called pt to offer her appt, but was unable to get through to leave a message  I will try again later  I froze the appt on 2/22 at 8:30 in the [facility] office for her

## 2021-02-19 ENCOUNTER — TELEPHONE (OUTPATIENT)
Dept: SURGICAL ONCOLOGY | Facility: CLINIC | Age: 31
End: 2021-02-19

## 2021-02-22 ENCOUNTER — CONSULT (OUTPATIENT)
Dept: SURGICAL ONCOLOGY | Facility: CLINIC | Age: 31
End: 2021-02-22
Payer: COMMERCIAL

## 2021-02-22 VITALS
SYSTOLIC BLOOD PRESSURE: 98 MMHG | RESPIRATION RATE: 16 BRPM | HEART RATE: 71 BPM | HEIGHT: 68 IN | BODY MASS INDEX: 23.04 KG/M2 | WEIGHT: 152 LBS | DIASTOLIC BLOOD PRESSURE: 60 MMHG | TEMPERATURE: 97.9 F

## 2021-02-22 DIAGNOSIS — N64.52 DISCHARGE FROM BREAST: ICD-10-CM

## 2021-02-22 DIAGNOSIS — N64.52 NIPPLE DISCHARGE: Primary | ICD-10-CM

## 2021-02-22 DIAGNOSIS — R92.1 BREAST CALCIFICATIONS: ICD-10-CM

## 2021-02-22 PROCEDURE — 3008F BODY MASS INDEX DOCD: CPT | Performed by: NURSE PRACTITIONER

## 2021-02-22 PROCEDURE — 99244 OFF/OP CNSLTJ NEW/EST MOD 40: CPT | Performed by: NURSE PRACTITIONER

## 2021-02-22 NOTE — PROGRESS NOTES
Surgical Oncology Follow Up       [facility]  [facility] [id number]    Junior Raisa  1990  [id number]      Chief Complaint   Patient presents with    Consult     Pt is here for initial consultation nipple discharge        Assessment/Plan:  1  Discharge from breast    2  Nipple discharge  - MRI breast bilateral w and wo contrast w cad; Future - will call with results  - 3 mo f/u visit    3  Breast calcifications  - Mammo diagnostic bilateral w 3d & cad; Future - 6 mo f/u     Discussion/Summary:   Patient is a 40-year-old female that presents today for consultation regarding spontaneous right nipple discharge  She underwent a diagnostic mammogram and right breast ultrasound which did not reveal any findings to explain the nipple discharge  She does have bilateral breast calcifications and a six-month follow-up mammogram was recommended to ensure stability  I will order this for her today  An MRI was recommended at this time for further evaluation of the discharge  On her exam today, she does have dense nodular tissue bilaterally but I do not appreciate any dominant masses  I was able to reproduce right nipple discharge from the 9 o'clock position of right nipple with compression only  The discharge was white/ yellow with a tinge of dark brown/red on today's exam  I  agree with the recommendation for a breast MRI and I will call her with these results and further recommendations will be made at that time  I did review that if her MRI is benign this needs to be followed clinically and I would like to see her back in the office again in 3 months to ensure a stable clinical exam   I instructed her to contact me with any spontaneous nipple discharge or changes on self breast exam prior to that time    I also reviewed the use of EPO to help with breast tenderness (denies seizure history) and I have provided her with the dosing recommendations  I also reviewed decreasing salty foods and caffeine in the diet, wearing a supportive bra and NSAIDS as needed for pain  She is in agreement with these recommendations  All of her questions were answered today  History of Present Illness:     -Interval History: Patient is a 40-year-old female who presents today for consultation regarding right nipple discharge  She states that she has experienced bilateral nipple discharge  For many years with compression only however about 2 weeks ago she noticed a red in stained on her bra and when she squeezed her right breast she was able to express  Bloody nipple discharge  She was evaluated by her gynecologist who recommended diagnostic imaging  She had a bilateral 3D diagnostic mammogram bilateral breast ultrasound on February 12, 2021  There were diffuse bilateral calcifications which were probably benign and a six-month follow-up mammogram was recommended to ensure stability  There was a benign cyst at the 12 o'clock position of the right breast  There were no findings to explain the patient's right bloody nipple discharge  A bilateral breast MRI was recommended  She was referred to our office for further workup  Patient states that she has not experienced any further spontaneous nipple discharge since that incident  She has no personal history of cancer and reports 2 paternal great aunts with a breast cancer diagnosis  She also reports 1 maternal great aunt who was diagnosed in her 62s  Her paternal grandfather was diagnosed with colon cancer at age 48  She is not of Ashkenazi [place] descent  Is menarche age 4, 1 pregnancy, no live births  She is not currently pregnant  She is currently using an IUD  She has never used hormone replacement therapy  She is a nonsmoker and consumes approximately 4-5 alcoholic beverages for her week      Review of Systems:  Review of Systems   Constitutional: Negative for activity change, appetite change, chills, fatigue, fever and unexpected weight change  Respiratory: Positive for shortness of breath (intermittent)  Negative for cough  Cardiovascular: Positive for palpitations  Negative for chest pain  Gastrointestinal: Positive for nausea and vomiting  Negative for abdominal pain, constipation and diarrhea  Musculoskeletal: Negative for arthralgias, back pain, gait problem and myalgias  Skin: Negative for color change and rash  Neurological: Positive for weakness  Negative for dizziness and headaches  Hematological: Negative for adenopathy  Psychiatric/Behavioral: Negative for agitation and confusion  All other systems reviewed and are negative        Patient Active Problem List   Diagnosis    Chronic pain of both knees    Acute internal derangement of left knee    Pelvic pain    Patellofemoral dysfunction of left knee    Irregular uterine bleeding    Nipple discharge     Past Medical History:   Diagnosis Date    Acid reflux     silent refulx    Hemorrhoids     MRSA infection 2014    in R underarm     Past Surgical History:   Procedure Laterality Date    NO PAST SURGERIES       Family History   Problem Relation Age of Onset    Deep vein thrombosis Mother     Hypertension Father     Scoliosis Sister     ADD / ADHD Sister     Uterine cancer Maternal Grandmother     Breast cancer Other 59    Colon cancer Neg Hx     Ovarian cancer Neg Hx     Cervical cancer Neg Hx      Social History     Socioeconomic History    Marital status: Single     Spouse name: Not on file    Number of children: Not on file    Years of education: Not on file    Highest education level: Not on file   Occupational History    Occupation: self employed   Social Needs    Financial resource strain: Not on file    Food insecurity     Worry: Not on file     Inability: Not on file   [employer] needs     Medical: Not on file     Non-medical: Not on file   Tobacco Use    Smoking status: Never Smoker    Smokeless tobacco: Never Used   Substance and Sexual Activity    Alcohol use: Yes     Frequency: 4 or more times a week     Drinks per session: 1 or 2    Drug use: No    Sexual activity: Yes     Birth control/protection: I U D  Lifestyle    Physical activity     Days per week: Not on file     Minutes per session: Not on file    Stress: Not on file   Relationships    Social connections     Talks on phone: Not on file     Gets together: Not on file     Attends Faith service: Not on file     Active member of club or organization: Not on file     Attends meetings of clubs or organizations: Not on file     Relationship status: Not on file    Intimate partner violence     Fear of current or ex partner: Not on file     Emotionally abused: Not on file     Physically abused: Not on file     Forced sexual activity: Not on file   Other Topics Concern    Not on file   Social History Narrative    Not on file       Current Outpatient Medications:     cholecalciferol (VITAMIN D3) 1,000 units tablet, Take 1,000 Units by mouth daily, Disp: , Rfl:     PARAGARD INTRAUTERINE COPPER IU, by Intrauterine route, Disp: , Rfl:     Ascorbic Acid (vitamin C) 100 MG tablet, Take 100 mg by mouth daily, Disp: , Rfl:     ferrous sulfate 325 (65 Fe) mg tablet, Take 325 mg by mouth daily with breakfast, Disp: , Rfl:     omeprazole (PriLOSEC) 20 mg delayed release capsule, Take 1 capsule (20 mg total) by mouth daily (Patient not taking: Reported on 2/9/2021), Disp: 30 capsule, Rfl: 2  Allergies   Allergen Reactions    Sulfa Antibiotics Lip Swelling     Vitals:    02/22/21 0816   BP: 98/60   Pulse: 71   Resp: 16   Temp: 97 9 °F (36 6 °C)       Physical Exam  Vitals signs reviewed  Constitutional:       Appearance: Normal appearance  HENT:      Head: Normocephalic and atraumatic  Pulmonary:      Effort: Pulmonary effort is normal    Chest:      Breasts:         Right: Nipple discharge and tenderness present   No swelling, bleeding, inverted nipple, mass or skin change  Left: Tenderness present  No swelling, bleeding, inverted nipple, mass, nipple discharge or skin change  Comments: Dense, nodular tissue noted bilaterally  No dominant masses  With compression of the right breast there was a white/yellow/brown/red? Tinged discharge from the 9:00 position of the nipple  Musculoskeletal: Normal range of motion  Lymphadenopathy:      Upper Body:      Right upper body: No supraclavicular or axillary adenopathy  Left upper body: No supraclavicular or axillary adenopathy  Skin:     General: Skin is warm  Neurological:      General: No focal deficit present  Mental Status: She is alert and oriented to person, place, and time  Psychiatric:         Mood and Affect: Mood normal            Results:    Imaging  Us Breast Bilateral Limited (diagnostic), Mammo Diagnostic Bilateral W 3d & Cad    Result Date: 2/12/2021  Narrative: DIAGNOSIS: Nipple discharge TECHNIQUE: Digital diagnostic mammography was performed  Computer Aided Detection (CAD) analyzed all applicable images  Ultrasound of the bilateral breast(s) was performed  COMPARISONS: None available  RELEVANT HISTORY: Family Breast Cancer History: History of breast cancer in Other  Family Medical History: Family medical history includes breast cancer in other  Personal History: Hormone history includes birth control and birth control  No known relevant surgical history  No known relevant medical history  RISK ASSESSMENT: 5 Year Tyrer-Cuzick: 0 28 % 10 Year Tyrer-Cuzick: 0 88 % Lifetime Tyrer-Cuzick: 17 23 % TISSUE DENSITY: The breasts are extremely dense, which lowers the sensitivity of mammography  INDICATION: Cole Morris is a 26 y o  female presenting for evaluation of 1 week bloody right nipple discharge  Prior to that, patient reported longstanding milky or yellowish bilateral discharge   She also reports a palpable concern in the right breast  FINDINGS: LEFT 1) CALCIFICATIONS:  There are diffuse calcifications throughout the left breast   Many of these show layering on lateral view most consistent with milk of calcium  Sonographic evaluation of the retroareolar left breast shows no sonographic mass or distortion  Several simple cysts are seen in the left breast 1-2 o'clock axis  RIGHT 2) CALCIFICATIONS:  Palpable triangle marker over the right breast 12:00 o'clock as no subjacent mammographic abnormality  Physician directed breast exam:  The area of palpable concern was palpated  There is a superficial 6 mm soft oval mass  No overlying skin changes seen  Sonographic evaluation shows a simple cyst at 12:00 o'clock 2 cm from the nipple corresponding to the patient's palpable concern measuring approximately 5 mm  Oval circumscribed mass right breast upper outer quadrant on mammography has a corresponding benign cyst on ultrasound at 10:00 o'clock 7 cm from the nipple measuring 9 x 8 x 11 mm  Another benign cysts on ultrasound is seen at 10:00 o'clock 5 cm from nipple measuring 11 mm  Sonographic evaluation of the retroareolar right breast shows no sonographic mass or distortion  Impression: 1  Patient's palpable concern left breast 12:00 o'clock is a simple cyst  2  Diffuse bilateral calcifications, left greater than right, are probably benign  Recommend six-month follow-up bilateral diagnostic mammogram  3  No mammographic or sonographic findings to explain patient's bloody right nipple discharge  Recommend bilateral breast MRI with contrast  I personally discussed these findings and recommendations with the patient  ASSESSMENT/BI-RADS CATEGORY: Left: 3 - Probably Benign Right: 3 - Probably Benign Overall: 3 - Probably Benign RECOMMENDATION:      - Diagnostic mammogram in 6 months for both breasts  - MRI for both breasts at the current time  Workstation ID: [id number]       I reviewed the above imaging data        [facility] Planning/Advance Directives:  Discussed disease status and treatment goals with the patient

## 2021-02-22 NOTE — PATIENT INSTRUCTIONS
Nipple Discharge   WHAT YOU NEED TO KNOW:   What do I need to know about nipple discharge? Nipple discharge is fluid from one or both nipples  Fluid may come out on its own or when you touch your breast or nipple  The fluid may be white, yellow, green, pink, watery, or bloody  Nipple discharge is normal in a woman that is pregnant or breastfeeding  A woman should contact her healthcare provider if she has nipple discharge when she is not pregnant or breast feeding  A man should always  contact his healthcare provider if he has nipple discharge  What signs and symptoms may happen with nipple discharge? · Fever or body aches    · Breast pain or discomfort    · A swollen breast or nipple    · A change in breast shape    · A change in nipple shape or color    · Swollen lymph nodes under your arms    What causes nipple discharge? · A growth or abnormal cells in the milk ducts    · Too much of the hormone that causes your breasts to make milk    · Breast cancer    · Infection or injury to the chest    · Touching the breast or nipple during sex    · Rubbing of clothing against the nipple during vigorous exercise    · Certain medicines such as oral contraceptives, antidepressants, or blood pressure medicines    How is nipple discharge diagnosed? Tell your healthcare provider about your symptoms  He may feel your breast for lumps or swelling  Tell him about any medicines that you take  You may need any of the following tests to find the cause of your nipple discharge:  · Blood tests  may be done to check your hormone levels or for infection or pregnancy  · Mammogram, ultrasound, or MRI  pictures may show a tumor or infection in your breast      · A ductoscopy  uses a scope to look inside your milk ducts for tumors or infection  · A breast biopsy  may be done to test for cancer or infection  Breast tissue is removed and sent to the lab for tests  How is nipple discharge treated?   Treatment will depend on the cause of your nipple discharge  Medicines that cause nipple discharge may be stopped or changed  Medicines may be given to control your hormone levels, decrease pain, or treat an infection  Avoid touching your nipples or breast  This may stop or decrease discharge from your nipples  Wear a tight fitting bra during exercise to decrease rubbing on your nipples  When should I contact my healthcare provider? · You have a fever  · You have more breast discharge or it changes color  · You have new or worsening breast pain  · You have questions or concerns about your condition or care  CARE AGREEMENT:   You have the right to help plan your care  Learn about your health condition and how it may be treated  Discuss treatment options with your healthcare providers to decide what care you want to receive  You always have the right to refuse treatment  The above information is an  only  It is not intended as medical advice for individual conditions or treatments  Talk to your doctor, nurse or pharmacist before following any medical regimen to see if it is safe and effective for you  © Copyright [address] Information is for End User's use only and may not be sold, redistributed or otherwise used for commercial purposes  All illustrations and images included in CareNotes® are the copyrighted property of [employer]  or [address] [place], [place] [address]     Breast Self Exam for Women   AMBULATORY CARE:   A breast self-exam (BSE)  is a way to check your breasts for lumps and other changes  Regular BSEs can help you know how your breasts normally look and feel  Most breast lumps or changes are not cancer, but you should always have them checked by a healthcare provider  Why you should do a BSE:  Breast cancer is the most common type of cancer in women  Even if you have mammograms, you may still want to do a BSE regularly   If you know how your breasts normally feel and look, it may help you know when to contact your healthcare provider  Mammograms can miss some cancers  You may find a lump during a BSE that did not show up on a mammogram   When you should do a BSE:  If you have periods, you may want to do your BSE 1 week after your period ends  This is the time when your breasts may be the least swollen, lumpy, or tender  You can do regular BSEs even if you are breastfeeding or have breast implants  Call your doctor if:   · You find any lumps or changes in your breasts  · You have breast pain or fluid coming from your nipples  · You have questions or concerns about your condition or care  How to do a BSE:       · Look at your breasts in a mirror  Look at the size and shape of each breast and nipple  Check for swelling, lumps, dimpling, scaly skin, or other skin changes  Look for nipple changes, such as a nipple that is painful or beginning to pull inward  Gently squeeze both nipples and check to see if fluid (that is not breast milk) comes out of them  If you find any of these or other breast changes, contact your healthcare provider  Check your breasts while you sit or  the following 3 positions:    ? Hang your arms down at your sides  ? Raise your hands and join them behind your head  ? Put firm pressure with your hands on your hips  Bend slightly forward while you look at your breasts in the mirror  · Lie down and feel your breasts  When you lie down, your breast tissue spreads out evenly over your chest  This makes it easier for you to feel for lumps and anything that may not be normal for your breasts  Do a BSE on one breast at a time  ? Place a small pillow or towel under your left shoulder  Put your left arm behind your head  ? Use the 3 middle fingers of your right hand  Use your fingertip pads, on the top of your fingers  Your fingertip pad is the most sensitive part of your finger  ? Use small circles to feel your breast tissue    Use your fingertip pads to make dime-sized, overlapping circles on your breast and armpits  Use light, medium, and firm pressure  First, press lightly  Second, press with medium pressure to feel a little deeper into the breast  Last, use firm pressure to feel deep within your breast     ? Examine your entire breast area  Examine the breast area from above the breast to below the breast where you feel only ribs  Make small circles with your fingertips, starting in the middle of your armpit  Make circles going up and down the breast area  Continue toward your breast and all the way across it  Examine the area from your armpit all the way over to the middle of your chest (breastbone)  Stop at the middle of your chest     ? Move the pillow or towel to your right shoulder, and put your right arm behind your head  Use the 3 fingertip pads of your left hand, and repeat the above steps to do a BSE on your right breast   What else you can do to check for breast problems or cancer:  Talk to your healthcare provider about mammograms  A mammogram is an x-ray of your breasts to screen for breast cancer or other problems  Your provider can tell you the benefits and risks of mammograms  The first mammogram is usually at age 39 or 48  Your provider may recommend you start at 36 or younger if your risk for breast cancer is high  Mammograms usually continue every 1 to 2 years until age 76  Follow up with your doctor as directed:  Write down your questions so you remember to ask them during your visits  © Copyright [address] Information is for End User's use only and may not be sold, redistributed or otherwise used for commercial purposes  All illustrations and images included in CareNotes® are the copyrighted property of [employer] M [employer] Inc  or [facility] Abbe Boucher   The above information is an  only  It is not intended as medical advice for individual conditions or treatments   Talk to your doctor, nurse or pharmacist before following any medical regimen to see if it is safe and effective for you  Fibrocystic Breast Changes   AMBULATORY CARE:   Fibrocystic changes  are changes in your breast tissue  Your breast tissue may have small cysts, benign lumps (not cancer), or thickened areas  These breast tissue changes are common in women who have not gone through menopause  Fibrocystic breast changes do not increase your risk of breast cancer  The cause of fibrocystic breast changes is unknown  They may be related to hormonal changes that occur during your menstrual cycle  Common signs and symptoms of fibrocystic breast changes:  Signs and symptoms may be more noticeable before your period  You may have any of the following:  · Tenderness and pain in the upper outer areas of both breasts    · Cysts that get larger and more painful before your period    · Breast swelling during your period    · Clear or cloudy nipple discharge    Contact your healthcare provider if:   · You notice other changes in your breasts or nipples, such as dimpling or a rash  · You have bloody nipple discharge  · You have a fever  · You have questions or concerns about your condition or care  Treatment:  Your healthcare provider may recommend the following to relieve your symptoms:  · NSAIDs , such as ibuprofen, help decrease swelling, pain, and fever  This medicine is available with or without a doctor's order  NSAIDs can cause stomach bleeding or kidney problems in certain people  If you take blood thinner medicine, always ask your healthcare provider if NSAIDs are safe for you  Always read the medicine label and follow directions  · Oral contraceptives  (birth control pills) may be recommended by your healthcare provider  These may help to decrease the level of hormones that worsen your signs and symptoms  · Surgery  to remove a large lump or cysts that return after drainage may be done      Manage your symptoms:   · Apply heat  on your breasts for 20 to 30 minutes every 2 hours for as many days as directed  Heat helps decrease pain and muscle spasms  · Apply ice  on your breasts for 15 to 20 minutes every hour or as directed  Use an ice pack, or put crushed ice in a plastic bag  Cover it with a towel  Ice helps prevent tissue damage and decreases swelling and pain  · Wear a well-fitted, supportive bra  It may help to relieve pain and swelling  · Avoid or limit caffeine  This may help to decrease symptoms in some women  Caffeine is found in coffee, tea, sodas, and chocolate  Continue breast self-exams:  Check your breast for changes in size, shape, or feel of the breast tissue  Check under your arms and all around your breasts  If you have monthly periods, examine your breasts after your period is over  Contact your healthcare provider if you notice any changes in your breasts  If you have questions, ask for more information about how to do a breast self-exam      Follow up with your healthcare provider as directed:  Write down your questions so you remember to ask them during your visits  © Copyright [address] Information is for End User's use only and may not be sold, redistributed or otherwise used for commercial purposes  All illustrations and images included in CareNotes® are the copyrighted property of [employer] M [employer] Inc  or [address] [place], [place] [address]   The above information is an  only  It is not intended as medical advice for individual conditions or treatments  Talk to your doctor, nurse or pharmacist before following any medical regimen to see if it is safe and effective for you

## 2021-03-18 ENCOUNTER — HOSPITAL ENCOUNTER (OUTPATIENT)
Dept: MRI IMAGING | Facility: HOSPITAL | Age: 31
Discharge: HOME | End: 2021-03-18
Payer: COMMERCIAL

## 2021-03-18 VITALS — BODY MASS INDEX: 23.04 KG/M2 | HEIGHT: 68 IN | WEIGHT: 152 LBS

## 2021-03-18 DIAGNOSIS — N64.52 NIPPLE DISCHARGE: ICD-10-CM

## 2021-03-18 PROCEDURE — G1004 CDSM NDSC: HCPCS

## 2021-03-18 PROCEDURE — C8908 MRI W/O FOL W/CONT, BREAST,: HCPCS

## 2021-03-18 PROCEDURE — A9585 GADOBUTROL INJECTION: HCPCS | Performed by: NURSE PRACTITIONER

## 2021-03-18 RX ADMIN — GADOBUTROL 6 ML: 604.72 INJECTION INTRAVENOUS at 09:56

## 2021-03-19 ENCOUNTER — TELEPHONE (OUTPATIENT)
Dept: SURGICAL ONCOLOGY | Facility: CLINIC | Age: 31
End: 2021-03-19

## 2021-03-19 NOTE — TELEPHONE ENCOUNTER
Spoke with patient and reviewed normal results of MRI  She states she continues to experience nipple discharge- both with compression and spontaneous  She also reports "heat" in the breast  Denies redness, fever, chills  I am scheduled to see the patient back in May  I instructed her to monitor and call with any changes  Discussed if spontaneous discharge persists, could consider surgical duct excision  Will readdress at upcoming appt

## 2021-04-19 ENCOUNTER — ANNUAL EXAM (OUTPATIENT)
Dept: OBGYN CLINIC | Facility: MEDICAL CENTER | Age: 31
End: 2021-04-19
Payer: COMMERCIAL

## 2021-04-19 VITALS
HEIGHT: 67 IN | WEIGHT: 150.6 LBS | DIASTOLIC BLOOD PRESSURE: 62 MMHG | BODY MASS INDEX: 23.64 KG/M2 | SYSTOLIC BLOOD PRESSURE: 100 MMHG

## 2021-04-19 DIAGNOSIS — Z01.419 ENCNTR FOR GYN EXAM (GENERAL) (ROUTINE) W/O ABN FINDINGS: ICD-10-CM

## 2021-04-19 PROCEDURE — 3008F BODY MASS INDEX DOCD: CPT | Performed by: PHYSICIAN ASSISTANT

## 2021-04-19 PROCEDURE — 87624 HPV HI-RISK TYP POOLED RSLT: CPT | Performed by: PHYSICIAN ASSISTANT

## 2021-04-19 PROCEDURE — 0503F POSTPARTUM CARE VISIT: CPT | Performed by: PHYSICIAN ASSISTANT

## 2021-04-19 PROCEDURE — G0145 SCR C/V CYTO,THINLAYER,RESCR: HCPCS | Performed by: PHYSICIAN ASSISTANT

## 2021-04-19 PROCEDURE — 99395 PREV VISIT EST AGE 18-39: CPT | Performed by: PHYSICIAN ASSISTANT

## 2021-04-19 NOTE — PROGRESS NOTES
Evette FERNANDEZ Sigifredo  1990      CC:  Yearly exam    S:  26 y o  female here for yearly exam      Sexual activity: She is sexually active without pain, bleeding or dryness  She has the same partner for 2 years  Contraception: She uses ParaGard for contraception  Her cycles with this are heavier and crampier than prior to her ParaGard but this does not bother her  She is seeing surgical oncology for her more recent history of right bloody nipple discharge; she had a benign mammo, ultrasound and MRI of the breast and has f/u scheduled with them for this  She says the bloody nipple discharge has completely resolved but she still has bilateral milky discharge which she has had for years  She is currently planning a [place] festival this summer and is dressing up as Adriana White for a photo shoot today for this  Last Pap unknown    We reviewed [facility]P guidelines for Pap testing today       Family hx of breast cancer: maternal great aunt  Family hx of ovarian cancer: no  Family hx of colon cancer: no  Family hx of uterine cancer: MGM       Current Outpatient Medications:     Ascorbic Acid (vitamin C) 100 MG tablet, Take 100 mg by mouth daily, Disp: , Rfl:     cholecalciferol (VITAMIN D3) 1,000 units tablet, Take 1,000 Units by mouth daily, Disp: , Rfl:     ferrous sulfate 325 (65 Fe) mg tablet, Take 325 mg by mouth daily with breakfast, Disp: , Rfl:     PARAGARD INTRAUTERINE COPPER IU, by Intrauterine route, Disp: , Rfl:   Social History     Socioeconomic History    Marital status: Single     Spouse name: Not on file    Number of children: Not on file    Years of education: Not on file    Highest education level: Not on file   Occupational History    Occupation: self employed   Social Needs    Financial resource strain: Not on file    Food insecurity     Worry: Not on file     Inability: Not on file   [employer] needs     Medical: Not on file     Non-medical: Not on file   Tobacco Use    Smoking status: Never Smoker    Smokeless tobacco: Never Used   Substance and Sexual Activity    Alcohol use: Yes     Frequency: 4 or more times a week     Drinks per session: 1 or 2    Drug use: No    Sexual activity: Yes     Birth control/protection: I U D  Comment: Paragard 12/2017   Lifestyle    Physical activity     Days per week: Not on file     Minutes per session: Not on file    Stress: Not on file   Relationships    Social connections     Talks on phone: Not on file     Gets together: Not on file     Attends Religion service: Not on file     Active member of club or organization: Not on file     Attends meetings of clubs or organizations: Not on file     Relationship status: Not on file    Intimate partner violence     Fear of current or ex partner: Not on file     Emotionally abused: Not on file     Physically abused: Not on file     Forced sexual activity: Not on file   Other Topics Concern    Not on file   Social History Narrative    Not on file     Family History   Problem Relation Age of Onset    Deep vein thrombosis Mother     Hypertension Father     Scoliosis Sister     ADD / ADHD Sister     Uterine cancer Maternal Grandmother     Breast cancer Other 59    Colon cancer Neg Hx     Ovarian cancer Neg Hx     Cervical cancer Neg Hx       Past Medical History:   Diagnosis Date    Acid reflux     silent refulx    Hemorrhoids     MRSA infection 2014    in R underarm        Review of Systems   Respiratory: Negative  Cardiovascular: Negative  Gastrointestinal: Negative for constipation and diarrhea  Genitourinary: Negative for difficulty urinating, pelvic pain, vaginal bleeding, vaginal discharge, itching or odor  O:  Blood pressure 100/62, height 5' 7 32" (1 71 m), weight 68 3 kg (150 lb 9 6 oz), last menstrual period 03/26/2021, not currently breastfeeding      Patient appears well and is not in distress  Neck is supple without masses  Breasts are symmetrical without mass, tenderness, nipple discharge, skin changes or adenopathy  Abdomen is soft and nontender without masses  External genitals are normal without lesions or rashes  Urethral meatus and urethra are normal  Bladder is normal to palpation  Vagina is normal without discharge or bleeding  Cervix is normal without discharge or lesion  IUD strings are normal    Uterus is normal, mobile, nontender without palpable mass  Adnexa are normal, nontender, without palpable mass  A:  Yearly exam      P:   Pap and HPV today    Follow-up with breast surgery      RTO one year for yearly exam or sooner as needed

## 2021-04-21 LAB
HPV HR 12 DNA CVX QL NAA+PROBE: NEGATIVE
HPV16 DNA CVX QL NAA+PROBE: NEGATIVE
HPV18 DNA CVX QL NAA+PROBE: NEGATIVE

## 2021-04-22 LAB
LAB AP GYN PRIMARY INTERPRETATION: NORMAL
Lab: NORMAL

## 2021-05-21 ENCOUNTER — TELEPHONE (OUTPATIENT)
Dept: SURGICAL ONCOLOGY | Facility: CLINIC | Age: 31
End: 2021-05-21

## 2021-05-21 NOTE — TELEPHONE ENCOUNTER
Called Evette, no response, left her a voice message to reschedule   Her mammo was changed to August so I left a message when she reschedules reschedule after her mammogram

## 2021-05-25 ENCOUNTER — OFFICE VISIT (OUTPATIENT)
Dept: URGENT CARE | Facility: CLINIC | Age: 31
End: 2021-05-25
Payer: COMMERCIAL

## 2021-05-25 VITALS
BODY MASS INDEX: 22.73 KG/M2 | RESPIRATION RATE: 18 BRPM | HEIGHT: 68 IN | HEART RATE: 75 BPM | DIASTOLIC BLOOD PRESSURE: 53 MMHG | SYSTOLIC BLOOD PRESSURE: 96 MMHG | WEIGHT: 150 LBS | TEMPERATURE: 97.9 F | OXYGEN SATURATION: 99 %

## 2021-05-25 DIAGNOSIS — R35.0 URINARY FREQUENCY: Primary | ICD-10-CM

## 2021-05-25 LAB
SL AMB  POCT GLUCOSE, UA: NEGATIVE
SL AMB LEUKOCYTE ESTERASE,UA: ABNORMAL
SL AMB POCT BILIRUBIN,UA: NEGATIVE
SL AMB POCT BLOOD,UA: NEGATIVE
SL AMB POCT CLARITY,UA: CLEAR
SL AMB POCT COLOR,UA: ABNORMAL
SL AMB POCT KETONES,UA: NEGATIVE
SL AMB POCT NITRITE,UA: NEGATIVE
SL AMB POCT PH,UA: 8
SL AMB POCT SPECIFIC GRAVITY,UA: 1.01
SL AMB POCT URINE PROTEIN: NEGATIVE
SL AMB POCT UROBILINOGEN: 0.2

## 2021-05-25 PROCEDURE — 87086 URINE CULTURE/COLONY COUNT: CPT | Performed by: PHYSICIAN ASSISTANT

## 2021-05-25 PROCEDURE — 81002 URINALYSIS NONAUTO W/O SCOPE: CPT | Performed by: PHYSICIAN ASSISTANT

## 2021-05-25 PROCEDURE — 99213 OFFICE O/P EST LOW 20 MIN: CPT | Performed by: PHYSICIAN ASSISTANT

## 2021-05-25 RX ORDER — NITROFURANTOIN 25; 75 MG/1; MG/1
100 CAPSULE ORAL 2 TIMES DAILY
Qty: 10 CAPSULE | Refills: 0 | Status: SHIPPED | OUTPATIENT
Start: 2021-05-25 | End: 2021-05-30

## 2021-05-25 NOTE — PROGRESS NOTES
[facility]        NAME: Milad Goodwin is a 26 y o  female  : 1990    MRN: [id number]  DATE: May 25, 2021  TIME: 12:52 PM    Assessment and Plan   Urinary frequency [R35 0]  1  Urinary frequency  nitrofurantoin (MACROBID) 100 mg capsule    Urine culture         Patient Instructions     Patient Instructions     Start Macro[place]  Hydrate with plenty of water, cranberry  AZO over-the-counter for burning with urination per package direction  Urine Culture  PCP follow up  Go to ED with worsening symptoms, flank pain, fever, chills, vomiting  Urinary Tract Infection in Women   WHAT YOU NEED TO KNOW:   A urinary tract infection (UTI) is caused by bacteria that get inside your urinary tract  Most bacteria that enter your urinary tract come out when you urinate  If the bacteria stay in your urinary tract, you may get an infection  Your urinary tract includes your kidneys, ureters, bladder, and urethra  Urine is made in your kidneys, and it flows from the ureters to the bladder  Urine leaves the bladder through the urethra  A UTI is more common in your lower urinary tract, which includes your bladder and urethra  DISCHARGE INSTRUCTIONS:   Return to the emergency department if:   · You are urinating very little or not at all  · You have a high fever with shaking chills  · You have side or back pain that gets worse  Call your doctor if:   · You have a fever  · You do not feel better after 2 days of taking antibiotics  · You are vomiting  · You have questions or concerns about your condition or care  Medicines:   · Antibiotics  help fight a bacterial infection  If you have UTIs often (called recurrent UTIs), you may be given antibiotics to take regularly  You will be given directions for when and how to use antibiotics  The goal is to prevent UTIs but not cause antibiotic resistance by using antibiotics too often      · Medicines  may be given to decrease pain and burning when you urinate  They will also help decrease the feeling that you need to urinate often  These medicines will make your urine orange or red  · Take your medicine as directed  Contact your healthcare provider if you think your medicine is not helping or if you have side effects  Tell him or her if you are allergic to any medicine  Keep a list of the medicines, vitamins, and herbs you take  Include the amounts, and when and why you take them  Bring the list or the pill bottles to follow-up visits  Carry your medicine list with you in case of an emergency  Follow up with your healthcare provider as directed:  Write down your questions so you remember to ask them during your visits  Prevent another UTI:   · Empty your bladder often  Urinate and empty your bladder as soon as you feel the need  Do not hold your urine for long periods of time  · Wipe from front to back after you urinate or have a bowel movement  This will help prevent germs from getting into your urinary tract through your urethra  · Drink liquids as directed  Ask how much liquid to drink each day and which liquids are best for you  You may need to drink more liquids than usual to help flush out the bacteria  Do not drink alcohol, caffeine, or citrus juices  These can irritate your bladder and increase your symptoms  Your healthcare provider may recommend cranberry juice to help prevent a UTI  · Urinate after you have sex  This can help flush out bacteria passed during sex  · Do not douche or use feminine deodorants  These can change the chemical balance in your vagina  · Change sanitary pads or tampons often  This will help prevent germs from getting into your urinary tract  · Talk to your healthcare provider about your birth control method  You may need to change your method if it is increasing your risk for UTIs  · Wear cotton underwear and clothes that are loose    Tight pants and nylon underwear can trap moisture and cause bacteria to grow  · Vaginal estrogen may be recommended  This medicine helps prevent UTIs in women who have gone through menopause or are in darrin-menopause  · Do pelvic muscle exercises often  Pelvic muscle exercises may help you start and stop urinating  Strong pelvic muscles may help you empty your bladder easier  Squeeze these muscles tightly for 5 seconds like you are trying to hold back urine  Then relax for 5 seconds  Gradually work up to squeezing for 10 seconds  Do 3 sets of 15 repetitions a day, or as directed  © Copyright [address] Information is for End User's use only and may not be sold, redistributed or otherwise used for commercial purposes  All illustrations and images included in CareNotes® are the copyrighted property of [employer] M [employer]  or [address] [place] [address] [facility]  The above information is an  only  It is not intended as medical advice for individual conditions or treatments  Talk to your doctor, nurse or pharmacist before following any medical regimen to see if it is safe and effective for you  **Portions of the record may have been created with voice recognition software  Occasional wrong word or "sound a like" substitutions may have occurred due to the inherent limitations of voice recognition software  Read the chart carefully and recognize, using context, where substitutions have occurred  **     Chief Complaint     Chief Complaint   Patient presents with    Difficulty Urinating     yesterday     Urinary Frequency         History of Present Illness     68-year-old female presents clinic with complaints of urinary frequency and suprapubic pain and pressure x1 day  She reports symptoms similar to previous urinary tract infections she has had  She denies chills for pregnancy she is on birth control  She denies any fever or chills, flank pain, nausea vomiting        Review of Systems     Review of Systems   Constitutional: Negative for chills, fatigue and fever  Respiratory: Negative for shortness of breath  Cardiovascular: Negative for chest pain  Gastrointestinal: Positive for abdominal pain  Negative for nausea and vomiting  Genitourinary: Positive for frequency and urgency  Negative for difficulty urinating, dysuria, flank pain, hematuria, vaginal bleeding and vaginal discharge  Musculoskeletal: Negative for back pain and myalgias  Neurological: Negative for headaches           Current Medications     Current Outpatient Medications:     Ascorbic Acid (vitamin C) 100 MG tablet, Take 100 mg by mouth daily, Disp: , Rfl:     cholecalciferol (VITAMIN D3) 1,000 units tablet, Take 1,000 Units by mouth daily, Disp: , Rfl:     ferrous sulfate 325 (65 Fe) mg tablet, Take 325 mg by mouth daily with breakfast, Disp: , Rfl:     PARAGARD INTRAUTERINE COPPER IU, by Intrauterine route, Disp: , Rfl:     nitrofurantoin (MACROBID) 100 mg capsule, Take 1 capsule (100 mg total) by mouth 2 (two) times a day for 5 days, Disp: 10 capsule, Rfl: 0    Current Allergies     Allergies as of 05/25/2021 - Reviewed 05/25/2021   Allergen Reaction Noted    Sulfa antibiotics Lip Swelling 11/28/2018            The following portions of the patient's history were reviewed and updated as appropriate: allergies, current medications, past family history, past medical history, past social history, past surgical history and problem list      Past Medical History:   Diagnosis Date    Acid reflux     silent refulx    Hemorrhoids     MRSA infection 2014    in R underarm       Past Surgical History:   Procedure Laterality Date    NO PAST SURGERIES         Family History   Problem Relation Age of Onset    Deep vein thrombosis Mother     Hypertension Father     Scoliosis Sister     ADD / ADHD Sister     Uterine cancer Maternal Grandmother     Breast cancer Other 61    Colon cancer Neg Hx     Ovarian cancer Neg Hx     Cervical cancer Neg Hx          Medications have been verified  Objective     BP 96/53   Pulse 75   Temp 97 9 °F (36 6 °C) (Temporal)   Resp 18   Ht 5' 8" (1 727 m)   Wt 68 kg (150 lb)   LMP 05/04/2021   SpO2 99%   BMI 22 81 kg/m²        Physical Exam     Physical Exam  Vitals signs and nursing note reviewed  Constitutional:       General: She is not in acute distress  Appearance: Normal appearance  She is not ill-appearing  HENT:      Head: Normocephalic and atraumatic  Cardiovascular:      Rate and Rhythm: Normal rate  Pulmonary:      Effort: Pulmonary effort is normal    Abdominal:      General: Bowel sounds are normal       Tenderness: There is abdominal tenderness (Suprapubic)  There is no guarding or rebound  Skin:     General: Skin is warm and dry  Findings: No rash  Neurological:      Mental Status: She is alert

## 2021-05-25 NOTE — PATIENT INSTRUCTIONS
Start [place]  Hydrate with plenty of water, cranberry  AZO over-the-counter for burning with urination per package direction  Urine Culture  PCP follow up  Go to ED with worsening symptoms, flank pain, fever, chills, vomiting  Urinary Tract Infection in Women   WHAT YOU NEED TO KNOW:   A urinary tract infection (UTI) is caused by bacteria that get inside your urinary tract  Most bacteria that enter your urinary tract come out when you urinate  If the bacteria stay in your urinary tract, you may get an infection  Your urinary tract includes your kidneys, ureters, bladder, and urethra  Urine is made in your kidneys, and it flows from the ureters to the bladder  Urine leaves the bladder through the urethra  A UTI is more common in your lower urinary tract, which includes your bladder and urethra  DISCHARGE INSTRUCTIONS:   Return to the emergency department if:   · You are urinating very little or not at all  · You have a high fever with shaking chills  · You have side or back pain that gets worse  Call your doctor if:   · You have a fever  · You do not feel better after 2 days of taking antibiotics  · You are vomiting  · You have questions or concerns about your condition or care  Medicines:   · Antibiotics  help fight a bacterial infection  If you have UTIs often (called recurrent UTIs), you may be given antibiotics to take regularly  You will be given directions for when and how to use antibiotics  The goal is to prevent UTIs but not cause antibiotic resistance by using antibiotics too often  · Medicines  may be given to decrease pain and burning when you urinate  They will also help decrease the feeling that you need to urinate often  These medicines will make your urine orange or red  · Take your medicine as directed  Contact your healthcare provider if you think your medicine is not helping or if you have side effects   Tell him or her if you are allergic to any medicine  Keep a list of the medicines, vitamins, and herbs you take  Include the amounts, and when and why you take them  Bring the list or the pill bottles to follow-up visits  Carry your medicine list with you in case of an emergency  Follow up with your healthcare provider as directed:  Write down your questions so you remember to ask them during your visits  Prevent another UTI:   · Empty your bladder often  Urinate and empty your bladder as soon as you feel the need  Do not hold your urine for long periods of time  · Wipe from front to back after you urinate or have a bowel movement  This will help prevent germs from getting into your urinary tract through your urethra  · Drink liquids as directed  Ask how much liquid to drink each day and which liquids are best for you  You may need to drink more liquids than usual to help flush out the bacteria  Do not drink alcohol, caffeine, or citrus juices  These can irritate your bladder and increase your symptoms  Your healthcare provider may recommend cranberry juice to help prevent a UTI  · Urinate after you have sex  This can help flush out bacteria passed during sex  · Do not douche or use feminine deodorants  These can change the chemical balance in your vagina  · Change sanitary pads or tampons often  This will help prevent germs from getting into your urinary tract  · Talk to your healthcare provider about your birth control method  You may need to change your method if it is increasing your risk for UTIs  · Wear cotton underwear and clothes that are loose  Tight pants and nylon underwear can trap moisture and cause bacteria to grow  · Vaginal estrogen may be recommended  This medicine helps prevent UTIs in women who have gone through menopause or are in darrin-menopause  · Do pelvic muscle exercises often  Pelvic muscle exercises may help you start and stop urinating   Strong pelvic muscles may help you empty your bladder easier  Squeeze these muscles tightly for 5 seconds like you are trying to hold back urine  Then relax for 5 seconds  Gradually work up to squeezing for 10 seconds  Do 3 sets of 15 repetitions a day, or as directed  © Copyright [facility] [address] Information is for End User's use only and may not be sold, redistributed or otherwise used for commercial purposes  All illustrations and images included in CareNotes® are the copyrighted property of [employer]  or [facility] Boucher   The above information is an  only  It is not intended as medical advice for individual conditions or treatments  Talk to your doctor, nurse or pharmacist before following any medical regimen to see if it is safe and effective for you

## 2021-05-26 LAB — BACTERIA UR CULT: NORMAL

## 2021-06-01 ENCOUNTER — TELEPHONE (OUTPATIENT)
Dept: SURGICAL ONCOLOGY | Facility: CLINIC | Age: 31
End: 2021-06-01

## 2021-06-04 ENCOUNTER — TELEPHONE (OUTPATIENT)
Dept: SURGICAL ONCOLOGY | Facility: CLINIC | Age: 31
End: 2021-06-04

## 2021-09-03 ENCOUNTER — HOSPITAL ENCOUNTER (EMERGENCY)
Facility: HOSPITAL | Age: 31
Discharge: HOME | End: 2021-09-03
Attending: EMERGENCY MEDICINE | Admitting: EMERGENCY MEDICINE
Payer: COMMERCIAL

## 2021-09-03 ENCOUNTER — APPOINTMENT (EMERGENCY)
Dept: RADIOLOGY | Facility: HOSPITAL | Age: 31
End: 2021-09-03
Payer: COMMERCIAL

## 2021-09-03 VITALS
BODY MASS INDEX: 24.3 KG/M2 | HEART RATE: 68 BPM | RESPIRATION RATE: 18 BRPM | OXYGEN SATURATION: 99 % | SYSTOLIC BLOOD PRESSURE: 104 MMHG | WEIGHT: 159.83 LBS | TEMPERATURE: 98.4 F | DIASTOLIC BLOOD PRESSURE: 63 MMHG

## 2021-09-03 DIAGNOSIS — K21.9 GERD (GASTROESOPHAGEAL REFLUX DISEASE): ICD-10-CM

## 2021-09-03 DIAGNOSIS — R07.89 ATYPICAL CHEST PAIN: Primary | ICD-10-CM

## 2021-09-03 LAB
ALBUMIN SERPL BCP-MCNC: 3.9 G/DL (ref 3.5–5)
ALP SERPL-CCNC: 61 U/L (ref 46–116)
ALT SERPL W P-5'-P-CCNC: 13 U/L (ref 12–78)
ANION GAP SERPL CALCULATED.3IONS-SCNC: 8 MMOL/L (ref 4–13)
AST SERPL W P-5'-P-CCNC: 14 U/L (ref 5–45)
ATRIAL RATE: 63 BPM
BASOPHILS # BLD AUTO: 0.08 THOUSANDS/ΜL (ref 0–0.1)
BASOPHILS NFR BLD AUTO: 1 % (ref 0–1)
BILIRUB DIRECT SERPL-MCNC: 0.11 MG/DL (ref 0–0.2)
BILIRUB SERPL-MCNC: 0.48 MG/DL (ref 0.2–1)
BUN SERPL-MCNC: 13 MG/DL (ref 5–25)
CALCIUM SERPL-MCNC: 8.5 MG/DL (ref 8.3–10.1)
CHLORIDE SERPL-SCNC: 104 MMOL/L (ref 100–108)
CO2 SERPL-SCNC: 29 MMOL/L (ref 21–32)
CREAT SERPL-MCNC: 0.72 MG/DL (ref 0.6–1.3)
D DIMER PPP FEU-MCNC: <0.27 UG/ML FEU
EOSINOPHIL # BLD AUTO: 0.26 THOUSAND/ΜL (ref 0–0.61)
EOSINOPHIL NFR BLD AUTO: 4 % (ref 0–6)
ERYTHROCYTE [DISTWIDTH] IN BLOOD BY AUTOMATED COUNT: 11.9 % (ref 11.6–15.1)
GFR SERPL CREATININE-BSD FRML MDRD: 110 ML/MIN/1.73SQ M
GLUCOSE SERPL-MCNC: 79 MG/DL (ref 65–140)
HCG SERPL QL: NEGATIVE
HCT VFR BLD AUTO: 41.9 % (ref 34.8–46.1)
HGB BLD-MCNC: 13.9 G/DL (ref 11.5–15.4)
IMM GRANULOCYTES # BLD AUTO: 0.01 THOUSAND/UL (ref 0–0.2)
IMM GRANULOCYTES NFR BLD AUTO: 0 % (ref 0–2)
LIPASE SERPL-CCNC: 129 U/L (ref 73–393)
LYMPHOCYTES # BLD AUTO: 2.71 THOUSANDS/ΜL (ref 0.6–4.47)
LYMPHOCYTES NFR BLD AUTO: 44 % (ref 14–44)
MAGNESIUM SERPL-MCNC: 2.4 MG/DL (ref 1.6–2.6)
MCH RBC QN AUTO: 31.2 PG (ref 26.8–34.3)
MCHC RBC AUTO-ENTMCNC: 33.2 G/DL (ref 31.4–37.4)
MCV RBC AUTO: 94 FL (ref 82–98)
MONOCYTES # BLD AUTO: 0.65 THOUSAND/ΜL (ref 0.17–1.22)
MONOCYTES NFR BLD AUTO: 11 % (ref 4–12)
NEUTROPHILS # BLD AUTO: 2.51 THOUSANDS/ΜL (ref 1.85–7.62)
NEUTS SEG NFR BLD AUTO: 40 % (ref 43–75)
NRBC BLD AUTO-RTO: 0 /100 WBCS
PLATELET # BLD AUTO: 276 THOUSANDS/UL (ref 149–390)
PMV BLD AUTO: 10 FL (ref 8.9–12.7)
POTASSIUM SERPL-SCNC: 3.4 MMOL/L (ref 3.5–5.3)
PR INTERVAL: 194 MS
PROT SERPL-MCNC: 8 G/DL (ref 6.4–8.2)
QRS AXIS: 137 DEGREES
QRSD INTERVAL: 106 MS
QT INTERVAL: 408 MS
QTC INTERVAL: 417 MS
RBC # BLD AUTO: 4.46 MILLION/UL (ref 3.81–5.12)
SARS-COV-2 RNA RESP QL NAA+PROBE: NEGATIVE
SODIUM SERPL-SCNC: 141 MMOL/L (ref 136–145)
T WAVE AXIS: 101 DEGREES
TROPONIN I SERPL-MCNC: <0.02 NG/ML
TSH SERPL DL<=0.05 MIU/L-ACNC: 2.45 UIU/ML (ref 0.36–3.74)
VENTRICULAR RATE: 63 BPM
WBC # BLD AUTO: 6.22 THOUSAND/UL (ref 4.31–10.16)

## 2021-09-03 PROCEDURE — 96375 TX/PRO/DX INJ NEW DRUG ADDON: CPT

## 2021-09-03 PROCEDURE — 83735 ASSAY OF MAGNESIUM: CPT | Performed by: EMERGENCY MEDICINE

## 2021-09-03 PROCEDURE — 80076 HEPATIC FUNCTION PANEL: CPT | Performed by: EMERGENCY MEDICINE

## 2021-09-03 PROCEDURE — 84703 CHORIONIC GONADOTROPIN ASSAY: CPT | Performed by: EMERGENCY MEDICINE

## 2021-09-03 PROCEDURE — 99285 EMERGENCY DEPT VISIT HI MDM: CPT | Performed by: EMERGENCY MEDICINE

## 2021-09-03 PROCEDURE — U0003 INFECTIOUS AGENT DETECTION BY NUCLEIC ACID (DNA OR RNA); SEVERE ACUTE RESPIRATORY SYNDROME CORONAVIRUS 2 (SARS-COV-2) (CORONAVIRUS DISEASE [COVID-19]), AMPLIFIED PROBE TECHNIQUE, MAKING USE OF HIGH THROUGHPUT TECHNOLOGIES AS DESCRIBED BY CMS-2020-01-R: HCPCS | Performed by: EMERGENCY MEDICINE

## 2021-09-03 PROCEDURE — 93010 ELECTROCARDIOGRAM REPORT: CPT | Performed by: INTERNAL MEDICINE

## 2021-09-03 PROCEDURE — 80048 BASIC METABOLIC PNL TOTAL CA: CPT | Performed by: EMERGENCY MEDICINE

## 2021-09-03 PROCEDURE — 36415 COLL VENOUS BLD VENIPUNCTURE: CPT | Performed by: EMERGENCY MEDICINE

## 2021-09-03 PROCEDURE — 96361 HYDRATE IV INFUSION ADD-ON: CPT

## 2021-09-03 PROCEDURE — U0005 INFEC AGEN DETEC AMPLI PROBE: HCPCS | Performed by: EMERGENCY MEDICINE

## 2021-09-03 PROCEDURE — 85379 FIBRIN DEGRADATION QUANT: CPT | Performed by: EMERGENCY MEDICINE

## 2021-09-03 PROCEDURE — 85025 COMPLETE CBC W/AUTO DIFF WBC: CPT | Performed by: EMERGENCY MEDICINE

## 2021-09-03 PROCEDURE — C9113 INJ PANTOPRAZOLE SODIUM, VIA: HCPCS | Performed by: EMERGENCY MEDICINE

## 2021-09-03 PROCEDURE — 83690 ASSAY OF LIPASE: CPT | Performed by: EMERGENCY MEDICINE

## 2021-09-03 PROCEDURE — 96374 THER/PROPH/DIAG INJ IV PUSH: CPT

## 2021-09-03 PROCEDURE — 93005 ELECTROCARDIOGRAM TRACING: CPT

## 2021-09-03 PROCEDURE — 84443 ASSAY THYROID STIM HORMONE: CPT | Performed by: EMERGENCY MEDICINE

## 2021-09-03 PROCEDURE — 71046 X-RAY EXAM CHEST 2 VIEWS: CPT

## 2021-09-03 PROCEDURE — 99285 EMERGENCY DEPT VISIT HI MDM: CPT

## 2021-09-03 PROCEDURE — 84484 ASSAY OF TROPONIN QUANT: CPT | Performed by: EMERGENCY MEDICINE

## 2021-09-03 RX ORDER — POTASSIUM CHLORIDE 20 MEQ/1
40 TABLET, EXTENDED RELEASE ORAL ONCE
Status: COMPLETED | OUTPATIENT
Start: 2021-09-03 | End: 2021-09-03

## 2021-09-03 RX ORDER — ONDANSETRON 2 MG/ML
4 INJECTION INTRAMUSCULAR; INTRAVENOUS ONCE
Status: COMPLETED | OUTPATIENT
Start: 2021-09-03 | End: 2021-09-03

## 2021-09-03 RX ORDER — MAGNESIUM HYDROXIDE/ALUMINUM HYDROXICE/SIMETHICONE 120; 1200; 1200 MG/30ML; MG/30ML; MG/30ML
30 SUSPENSION ORAL ONCE
Status: COMPLETED | OUTPATIENT
Start: 2021-09-03 | End: 2021-09-03

## 2021-09-03 RX ORDER — PANTOPRAZOLE SODIUM 40 MG/1
40 INJECTION, POWDER, FOR SOLUTION INTRAVENOUS ONCE
Status: COMPLETED | OUTPATIENT
Start: 2021-09-03 | End: 2021-09-03

## 2021-09-03 RX ORDER — LIDOCAINE HYDROCHLORIDE 20 MG/ML
10 SOLUTION OROPHARYNGEAL ONCE
Status: COMPLETED | OUTPATIENT
Start: 2021-09-03 | End: 2021-09-03

## 2021-09-03 RX ADMIN — PANTOPRAZOLE SODIUM 40 MG: 40 INJECTION, POWDER, FOR SOLUTION INTRAVENOUS at 05:10

## 2021-09-03 RX ADMIN — SODIUM CHLORIDE 1000 ML: 0.9 INJECTION, SOLUTION INTRAVENOUS at 05:06

## 2021-09-03 RX ADMIN — POTASSIUM CHLORIDE 40 MEQ: 1500 TABLET, EXTENDED RELEASE ORAL at 06:13

## 2021-09-03 RX ADMIN — LIDOCAINE HYDROCHLORIDE 10 ML: 20 SOLUTION ORAL; TOPICAL at 05:15

## 2021-09-03 RX ADMIN — ONDANSETRON 4 MG: 2 INJECTION INTRAMUSCULAR; INTRAVENOUS at 05:06

## 2021-09-03 RX ADMIN — ALUMINA, MAGNESIA, AND SIMETHICONE ORAL SUSPENSION REGULAR STRENGTH 30 ML: 1200; 1200; 120 SUSPENSION ORAL at 05:14

## 2021-09-03 NOTE — ED PROVIDER NOTES
History  Chief Complaint   Patient presents with    Palpitations     c/o chest pressure "that feels like a bubble in chest, weird tension in back of throat and keep burping" sob since Mar 2020     Patient is a 75-year-old female with past medical history of silent acid reflux, history of eating disorder from age [age], now resolved, presents to the emergency department for feeling of chest pressure and a bubble" in the center of her chest as well as a weird tension in the back of her throat that started yesterday and continues early this morning  Patient reports since 2020 she has been having intermittent dyspnea and heart palpitations and has been worked up for this including having a 48 hour Holter monitor which was unremarkable  She states over the past 2-3 days she noticed that her palpitations and dyspnea were getting worse and more frequent  She denies having them currently but still has this chest discomfort  She denies any alleviating or aggravating factors  She denies ever having this chest sensation in the past   She does report because her acid reflux was silent for many years it did go untreated  She denies being on any PPIs or H2 blockers currently  She reports some chronic nausea but does admit to feeling mildly nauseated now  On review of systems, she does report a dry cough for the past 2 days  She also reports loose stool since yesterday  She denies any recent fevers or shaking chills, headaches, dizziness or near syncope, URI symptoms, hemoptysis, abdominal pain or distention, vomiting, blood per rectum, melena, dysuria, change in frequency, hematuria, flank pain, skin rash or color change, leg pain or swelling, extremity weakness or paresthesia or other focal neurologic deficits  Patient has a copper IUD in place for birth control  No exogenous estrogen use  Family history significant for grandfather who  of an acute MI    Mom also has a history of venous thromboembolism 1st diagnosed in her 46s  Patient denies any recent prolonged travel or immobilization  No recent surgery  History provided by:  Patient   used: No        Prior to Admission Medications   Prescriptions Last Dose Informant Patient Reported? Taking? Ascorbic Acid (vitamin C) 100 MG tablet  Self Yes No   Sig: Take 100 mg by mouth daily   PARAGARD INTRAUTERINE COPPER IU  Self Yes No   Sig: by Intrauterine route   cholecalciferol (VITAMIN D3) 1,000 units tablet  Self Yes No   Sig: Take 1,000 Units by mouth daily   ferrous sulfate 325 (65 Fe) mg tablet  Self Yes No   Sig: Take 325 mg by mouth daily with breakfast      Facility-Administered Medications: None       Past Medical History:   Diagnosis Date    Acid reflux     silent refulx    Hemorrhoids     MRSA infection 2014    in R underarm       Past Surgical History:   Procedure Laterality Date    NO PAST SURGERIES         Family History   Problem Relation Age of Onset    Deep vein thrombosis Mother     Hypertension Father     Scoliosis Sister     ADD / ADHD Sister     Uterine cancer Maternal Grandmother     Breast cancer Other 61    Colon cancer Neg Hx     Ovarian cancer Neg Hx     Cervical cancer Neg Hx      I have reviewed and agree with the history as documented  E-Cigarette/Vaping    E-Cigarette Use Never User      E-Cigarette/Vaping Substances    Nicotine No     THC No     CBD No     Flavoring No     Other No     Unknown No      Social History     Tobacco Use    Smoking status: Never Smoker    Smokeless tobacco: Never Used   Vaping Use    Vaping Use: Never used   Substance Use Topics    Alcohol use: Yes    Drug use: No       Review of Systems   Constitutional: Positive for fatigue  Negative for chills, diaphoresis and fever  HENT: Negative for congestion, ear pain, rhinorrhea and sore throat  Respiratory: Positive for cough and shortness of breath  Negative for chest tightness and wheezing  Cardiovascular: Positive for chest pain and palpitations  Negative for leg swelling  Gastrointestinal: Positive for nausea  Negative for abdominal distention, abdominal pain, blood in stool, constipation, diarrhea and vomiting  Genitourinary: Negative for dysuria, flank pain, frequency and hematuria  Musculoskeletal: Negative for back pain, neck pain and neck stiffness  Skin: Negative for color change, pallor, rash and wound  Allergic/Immunologic: Negative for immunocompromised state  Neurological: Negative for dizziness, syncope, weakness, light-headedness, numbness and headaches  Hematological: Negative for adenopathy  Psychiatric/Behavioral: Negative for confusion and decreased concentration  All other systems reviewed and are negative  Physical Exam  Physical Exam  Vitals and nursing note reviewed  Constitutional:       General: She is not in acute distress  Appearance: Normal appearance  She is well-developed  She is not ill-appearing, toxic-appearing or diaphoretic  HENT:      Head: Normocephalic and atraumatic  Right Ear: External ear normal       Left Ear: External ear normal       Mouth/Throat:      Comments: Orpharyngeal exam deferred at this time due to risk of exposure to COVID-19 during current pandemic  Patient has no oropharyngeal complaints  Eyes:      Extraocular Movements: Extraocular movements intact  Conjunctiva/sclera: Conjunctivae normal    Neck:      Vascular: No JVD  Cardiovascular:      Rate and Rhythm: Normal rate and regular rhythm  Pulses: Normal pulses  Heart sounds: Normal heart sounds  No murmur heard  No friction rub  No gallop  Pulmonary:      Effort: Pulmonary effort is normal  No respiratory distress  Breath sounds: Normal breath sounds  No wheezing, rhonchi or rales  Comments: Mild left chest wall tenderness  Chest:      Chest wall: Tenderness present  Abdominal:      General: There is no distension  Palpations: Abdomen is soft  Tenderness: There is no abdominal tenderness  There is no guarding or rebound  Musculoskeletal:         General: No swelling or tenderness  Normal range of motion  Cervical back: Normal range of motion and neck supple  No rigidity  Skin:     General: Skin is warm and dry  Coloration: Skin is not pale  Findings: No erythema or rash  Neurological:      General: No focal deficit present  Mental Status: She is alert and oriented to person, place, and time  Sensory: No sensory deficit  Motor: No weakness     Psychiatric:         Mood and Affect: Mood normal          Behavior: Behavior normal          Vital Signs  ED Triage Vitals [09/03/21 0253]   Temperature Pulse Respirations Blood Pressure SpO2   98 4 °F (36 9 °C) 68 18 125/74 98 %      Temp Source Heart Rate Source Patient Position - Orthostatic VS BP Location FiO2 (%)   Oral Monitor Sitting Right arm --      Pain Score       --         Vitals:    09/03/21 0253 09/03/21 0615   BP: 125/74 104/63   BP Location: Right arm Right arm   Pulse: 68 68   Resp: 18 18   Temp: 98 4 °F (36 9 °C)    TempSrc: Oral    SpO2: 98% 99%   Weight: 72 5 kg (159 lb 13 3 oz)        Visual Acuity      ED Medications  Medications   sodium chloride 0 9 % bolus 1,000 mL (0 mL Intravenous Stopped 9/3/21 0606)   ondansetron (ZOFRAN) injection 4 mg (4 mg Intravenous Given 9/3/21 0506)   pantoprazole (PROTONIX) injection 40 mg (40 mg Intravenous Given 9/3/21 0510)   Lidocaine Viscous HCl (XYLOCAINE) 2 % mucosal solution 10 mL (10 mL Swish & Swallow Given 9/3/21 0515)   aluminum-magnesium hydroxide-simethicone (MYLANTA) oral suspension 30 mL (30 mL Oral Given 9/3/21 0514)   potassium chloride (K-DUR,KLOR-CON) CR tablet 40 mEq (40 mEq Oral Given 9/3/21 0568)       Diagnostic Studies  Results Reviewed     Procedure Component Value Units Date/Time    Novel Coronavirus (Covid-19),PCR [facility] - 2 Hour Stat [[id number]]  (Normal) Collected: 09/03/21 0454    Lab Status: Final result Specimen: Nares from Nose Updated: 09/03/21 0548     SARS-CoV-2 Negative    Narrative: The specimen collection materials, transport medium, and/or testing methodology utilized in the production of these test results have been proven to be reliable in a limited validation with an abbreviated program under the Emergency Utilization Authorization provided by the FDA  Testing reported as "Presumptive positive" will be confirmed with secondary testing to ensure result accuracy  Clinical caution and judgement should be used with the interpretation of these results with consideration of the clinical impression and other laboratory testing  Testing reported as "Positive" or "Negative" has been proven to be accurate according to standard laboratory validation requirements  All testing is performed with control materials showing appropriate reactivity at standard intervals        Hepatic function panel [[id number]]  (Normal) Collected: 09/03/21 0459    Lab Status: Final result Specimen: Blood from Arm, Right Updated: 09/03/21 0532     Total Bilirubin 0 48 mg/dL      Bilirubin, Direct 0 11 mg/dL      Alkaline Phosphatase 61 U/L      AST 14 U/L      ALT 13 U/L      Total Protein 8 0 g/dL      Albumin 3 9 g/dL     Magnesium [[id number]]  (Normal) Collected: 09/03/21 0459    Lab Status: Final result Specimen: Blood from Arm, Right Updated: 09/03/21 0532     Magnesium 2 4 mg/dL     Lipase [[id number]]  (Normal) Collected: 09/03/21 0459    Lab Status: Final result Specimen: Blood from Arm, Right Updated: 09/03/21 0532     Lipase 129 u/L     hCG, qualitative pregnancy [[id number]]  (Normal) Collected: 09/03/21 0459    Lab Status: Final result Specimen: Blood from Arm, Right Updated: 09/03/21 0532     Preg, Serum Negative    TSH, 3rd generation with Free T4 reflex [[id number]]  (Normal) Collected: 09/03/21 0459    Lab Status: Final result Specimen: Blood from Arm, Right Updated: 09/03/21 0529     TSH [address] [place] [address] 2 449 uIU/mL     Narrative:      Patients undergoing fluorescein dye angiography may retain small amounts of fluorescein in the body for 48-72 hours post procedure  Samples containing fluorescein can produce falsely depressed TSH values  If the patient had this procedure,a specimen should be resubmitted post fluorescein clearance        Basic metabolic panel [[id number]]  (Abnormal) Collected: 09/03/21 0459    Lab Status: Final result Specimen: Blood from Arm, Right Updated: 09/03/21 0525     Sodium 141 mmol/L      Potassium 3 4 mmol/L      Chloride 104 mmol/L      CO2 29 mmol/L      ANION GAP 8 mmol/L      BUN 13 mg/dL      Creatinine 0 72 mg/dL      Glucose 79 mg/dL      Calcium 8 5 mg/dL      eGFR 110 ml/min/1 73sq m     Narrative:      [place] guidelines for Chronic Kidney Disease (CKD):     Stage 1 with normal or high GFR (GFR > 90 mL/min/1 73 square meters)    Stage 2 Mild CKD (GFR = 60-89 mL/min/1 73 square meters)    Stage 3A Moderate CKD (GFR = 45-59 mL/min/1 73 square meters)    Stage 3B Moderate CKD (GFR = 30-44 mL/min/1 73 square meters)    Stage 4 Severe CKD (GFR = 15-29 mL/min/1 73 square meters)    Stage 5 End Stage CKD (GFR <15 mL/min/1 73 square meters)  Note: GFR calculation is accurate only with a steady state creatinine    Troponin I [[id number]]  (Normal) Collected: 09/03/21 0459    Lab Status: Final result Specimen: Blood from Arm, Right Updated: 09/03/21 0524     Troponin I <0 02 ng/mL     D-Dimer [[id number]]  (Normal) Collected: 09/03/21 0459    Lab Status: Final result Specimen: Blood from Arm, Right Updated: 09/03/21 0522     D-Dimer, Quant <0 27 ug/ml FEU     CBC and differential [[id number]]  (Abnormal) Collected: 09/03/21 0459    Lab Status: Final result Specimen: Blood from Arm, Right Updated: 09/03/21 0509     WBC 6 22 Thousand/uL      RBC 4 46 Million/uL      Hemoglobin 13 9 g/dL      Hematocrit 41 9 %      MCV 94 fL      MCH 31 2 pg      MCHC 33 2 g/dL      RDW 11 9 %      MPV 10 0 fL      Platelets 186 Thousands/uL      nRBC 0 /100 WBCs      Neutrophils Relative 40 %      Immat GRANS % 0 %      Lymphocytes Relative 44 %      Monocytes Relative 11 %      Eosinophils Relative 4 %      Basophils Relative 1 %      Neutrophils Absolute 2 51 Thousands/µL      Immature Grans Absolute 0 01 Thousand/uL      Lymphocytes Absolute 2 71 Thousands/µL      Monocytes Absolute 0 65 Thousand/µL      Eosinophils Absolute 0 26 Thousand/µL      Basophils Absolute 0 08 Thousands/µL                  XR chest 2 views   ED Interpretation by Rubén Beltran DO (09/03 5606)   No acute abnormality in the chest                  Procedures  ECG 12 Lead Documentation Only    Date/Time: 9/3/2021 3:59 AM  Performed by: Rubén Beltran DO  Authorized by: Rubén Beltran DO     ECG reviewed by me, the ED Provider: yes    Patient location:  ED  Previous ECG:     Previous ECG:  Compared to current    Comparison ECG info:  10-; Axis has shifted right, iRBBB now present  Rate:     ECG rate:  63    ECG rate assessment: normal    Rhythm:     Rhythm: sinus rhythm    Ectopy:     Ectopy: none    QRS:     QRS axis:  Right    QRS intervals:  Normal  Conduction:     Conduction: abnormal      Abnormal conduction: incomplete RBBB    ST segments:     ST segments:  Normal  T waves:     T waves: inverted      Inverted:  AVL  Comments:      Low voltage QRS complex  ED Course  ED Course as of Sep 03 0720   Fri Sep 03, 2021   0550 D-Dimer, Quant: <0 27   0550 TSH 3RD GENERATON: 2 449   0550 Troponin I: <0 02   0603 Updated patient about negative workup thus far  Patient reports she is still having the intermittent symptoms and has not noticed much change but denies any worsening of her symptoms  At this time I still have a very low clinical suspicion for cardiac etiology    I recommended follow-up with her PCP and offered referral for GI but patient would prefer to go through her PCP  I also recommended starting her on omeprazole or Pepcid however she does not want to be on long-term GERD medication due to potential side effects  I did discuss the benefit as well as the risk of worsening reflux causing cell dysplasia and esophageal cancer in the long run  Advised her to return immediately if her symptoms worsen  HEART Risk Score      Most Recent Value   Heart Score Risk Calculator   History  0 Filed at: 09/03/2021 0428   ECG  1 Filed at: 09/03/2021 0428   Age  0 Filed at: 09/03/2021 0428   Risk Factors  0 Filed at: 09/03/2021 0428   Troponin  0 Filed at: 09/03/2021 0428   HEART Score  1 Filed at: 09/03/2021 0428              PERC Rule for PE      Most Recent Value   PERC Rule for PE   Age >=50  0 Filed at: 09/03/2021 0428   HR >=100  0 Filed at: 09/03/2021 0428   O2 Sat on room air < 95%  0 Filed at: 09/03/2021 0428   History of PE or DVT  0 Filed at: 09/03/2021 4470   Recent trauma or surgery  0 Filed at: 09/03/2021 0428   Hemoptysis  0 Filed at: 09/03/2021 0428   Exogenous estrogen  0 Filed at: 09/03/2021 0428   Unilateral leg swelling  0 Filed at: 09/03/2021 0428   PERC Rule for PE Results  0 Filed at: 09/03/2021 0428              SBIRT 20yo+      Most Recent Value   SBIRT (23 yo +)   In order to provide better care to our patients, we are screening all of our patients for alcohol and drug use  Would it be okay to ask you these screening questions? Yes Filed at: 09/03/2021 2930   Initial Alcohol Screen: US AUDIT-C    1  How often do you have a drink containing alcohol? 1 Filed at: 09/03/2021 0525   2  How many drinks containing alcohol do you have on a typical day you are drinking? 0 Filed at: 09/03/2021 0525   3b  FEMALE Any Age, or MALE 65+: How often do you have 4 or more drinks on one occassion? 1 Filed at: 09/03/2021 0525   Audit-C Score  2 Filed at: 09/03/2021 5855   JESSY: How many times in the past year have you       Used an illegal drug or used a prescription medication for non-medical reasons? Never Filed at: 09/03/2021 9764          Wells' Criteria for PE      Most Recent Value   Wells' Criteria for PE   Clinical signs and symptoms of DVT  0 Filed at: 09/03/2021 1885   PE is primary diagnosis or equally likely  0 Filed at: 09/03/2021 0428   HR >100  0 Filed at: 09/03/2021 0428   Immobilization at least 3 days or Surgery in the previous 4 weeks  0 Filed at: 09/03/2021 0428   Previous, objectively diagnosed PE or DVT  0 Filed at: 09/03/2021 0428   Hemoptysis  0 Filed at: 09/03/2021 2041   Malignancy with treatment within 6 months or palliative  0 Filed at: 09/03/2021 7552   Wells' Criteria Total  0 Filed at: 09/03/2021 2074                MDM  Number of Diagnoses or Management Options  Diagnosis management comments: 49-year-old female presents to the ED for chest pressure that started yesterday and the feeling as though there is a bubble in her chest   She also reports more frequent belching and a weird sensation in the back of her throat  Most likely these symptoms are secondary to acid reflux especially given her history of such  Given her age, lack of risk factors, low suspicion for acute MI or acute coronary syndrome  Low suspicion for PE but there is family history of venous thromboembolism and patient has been having intermittent dyspnea and palpitations  Will workup with cardiac labs and D-dimer  Will obtain chest x-ray  Will swab for COVID given dry cough and fatigue  Will provide GI cocktail for symptom relief and reassess    Symptoms have been constant for more than 6 hours therefore patient does not need repeat troponin testing if initial 1 is normal        Amount and/or Complexity of Data Reviewed  Clinical lab tests: ordered and reviewed  Tests in the radiology section of CPT®: ordered and reviewed  Tests in the medicine section of CPT®: reviewed and ordered  Independent visualization of images, tracings, or specimens: yes        Disposition  Final diagnoses:   Atypical chest pain   GERD (gastroesophageal reflux disease)     Time reflects when diagnosis was documented in both MDM as applicable and the Disposition within this note     Time User Action Codes Description Comment    9/3/2021  6:06 AM Christin MILLER Add [R07 89] Atypical chest pain     9/3/2021  6:06 AM Christin MILLER Add [K21 9] GERD (gastroesophageal reflux disease)       ED Disposition     ED Disposition Condition Date/Time Comment    Discharge Stable Fri Sep 3, 2021  6:06 AM Ivelisse Whitehead discharge to home/self care  Follow-up Information     Follow up With Specialties Details Why Contact Info Additional Information    Infolink  Call  To establish care with a primary care doctor [phone]       [facility] Gastroenterology Schedule an appointment as soon as possible for a visit  As needed [address]  [address] [facility] [address]  [address] [facility], [address] [facility] [address] [facility], [facility], [place], [address] Emergency Department Emergency Medicine Go to  If symptoms worsen 34 [facility] 109 [facility] Emergency Department, [address] [place], [place], [address]          Discharge Medication List as of 9/3/2021  6:07 AM      CONTINUE these medications which have NOT CHANGED    Details   Ascorbic Acid (vitamin C) 100 MG tablet Take 100 mg by mouth daily, Historical Med      cholecalciferol (VITAMIN D3) 1,000 units tablet Take 1,000 Units by mouth daily, Historical Med      ferrous sulfate 325 (65 Fe) mg tablet Take 325 mg by mouth daily with breakfast, Historical Med      PARAGARD INTRAUTERINE COPPER IU by Intrauterine route, Historical Med           No discharge procedures on file      PDMP Review     None ED Provider  Electronically Signed by           Serapio Schilder, DO  09/03/21 0055

## 2021-09-03 NOTE — DISCHARGE INSTRUCTIONS
Noncardiac Chest Pain   WHAT YOU NEED TO KNOW:   Noncardiac chest pain is pain or discomfort in your chest not caused by a heart problem  Possible causes include acid reflux, nerve or muscle problems, emotions, or chest wall or rib pain  DISCHARGE INSTRUCTIONS:   Seek care immediately if:   · You have severe chest pain  Call your doctor if:   · Your chest pain does not get better, even with treatment  · You have questions or concerns about your condition or care  Medicines:   · Medicines  may be given to treat the cause of your chest pain  You may be given medicines to decrease pain, relieve anxiety, decrease acid reflux, or relax muscles in your esophagus  · Take your medicine as directed  Contact your healthcare provider if you think your medicine is not helping or if you have side effects  Tell him or her if you are allergic to any medicine  Keep a list of the medicines, vitamins, and herbs you take  Include the amounts, and when and why you take them  Bring the list or the pill bottles to follow-up visits  Carry your medicine list with you in case of an emergency  Cognitive therapy:  Cognitive therapy may be helpful if you have panic attacks, anxiety, or depression  It can help you change how you react to situations that tend to trigger your chest pain  Healthy living tips: The following are general healthy guidelines  If the cause of your chest pain is known, your healthcare provider will give you specific guidelines to follow  · Do not smoke  Nicotine and other chemicals in cigarettes and cigars can cause lung and heart damage  Ask your healthcare provider for information if you currently smoke and need help to quit  E-cigarettes or smokeless tobacco still contain nicotine  Talk to your healthcare provider before you use these products  · Choose a variety of healthy foods as often as possible  Include fresh, frozen, or canned fruits and vegetables   Also include low-fat dairy products, fish, chicken (without skin), and lean meats  Your healthcare provider or a dietitian can help you create meal plans  You may need to avoid certain foods or drinks if your pain is caused by a digestion problem  · Lower your sodium (salt) intake  Limit foods that are high in sodium, such as canned foods, salty snacks, and cold cuts  If you add salt when you cook food, do not add more at the table  Choose low-sodium canned foods as much as possible  · Ask about activity  Your healthcare provider will tell you which activities to limit or avoid  Ask when you can drive, return to work, and have sex  Ask about the best exercise plan for you  · Maintain a healthy weight  Ask your healthcare provider what a healthy weight is for you  Ask him or her to help you create a weight loss plan if you are overweight  · Ask about vaccines you may need  Get the influenza (flu) vaccine every year as soon as recommended, usually in September or October  You may also need a pneumococcal vaccine to prevent pneumonia  The vaccine is usually given every 5 years, starting at age 72  Your healthcare provider can tell you if should get other vaccines, and when to get them  Follow up with your doctor as directed:  Write down your questions so you remember to ask them during your visits  © Copyright [employer] 2021 Information is for End User's use only and may not be sold, redistributed or otherwise used for commercial purposes  All illustrations and images included in CareNotes® are the copyrighted property of A D A M , Inc  or [facility] Abbe Boucher   The above information is an  only  It is not intended as medical advice for individual conditions or treatments  Talk to your doctor, nurse or pharmacist before following any medical regimen to see if it is safe and effective for you        GERD (Gastroesophageal Reflux Disease)   WHAT YOU NEED TO KNOW:   Gastroesophageal reflux disease (GERD) is reflux that occurs more than twice a week for a few weeks  Reflux means acid and food in the stomach back up into the esophagus  It usually causes heartburn and other symptoms  GERD can cause other health problems over time if it is not treated  DISCHARGE INSTRUCTIONS:   Call your local emergency number (911 in the [address]) if:   · You have severe chest pain and sudden trouble breathing  Seek care immediately if:   · You have trouble breathing after you vomit  · You have trouble swallowing, or pain with swallowing  · Your bowel movements are black, bloody, or tarry-looking  · Your vomit looks like coffee grounds or has blood in it  Call your doctor or gastroenterologist if:   · You feel full and cannot burp or vomit  · You vomit large amounts, or you vomit often  · You are losing weight without trying  · Your symptoms get worse or do not improve with treatment  · You have questions or concerns about your condition or care  Medicines:   · Medicines  are used to decrease stomach acid  Medicine may also be used to help your lower esophageal sphincter and stomach contract (tighten) more  · Take your medicine as directed  Contact your healthcare provider if you think your medicine is not helping or if you have side effects  Tell him or her if you are allergic to any medicine  Keep a list of the medicines, vitamins, and herbs you take  Include the amounts, and when and why you take them  Bring the list or the pill bottles to follow-up visits  Carry your medicine list with you in case of an emergency  Manage GERD:       · Do not have foods or drinks that may increase heartburn  These include chocolate, peppermint, fried or fatty foods, drinks that contain caffeine, or carbonated drinks (soda)  Other foods include spicy foods, onions, tomatoes, and tomato-based foods   Do not have foods or drinks that can irritate your esophagus, such as citrus fruits, juices, and alcohol  · Do not eat large meals  When you eat a lot of food at one time, your stomach needs more acid to digest it  Eat 6 small meals each day instead of 3 large ones, and eat slowly  Do not eat meals 2 to 3 hours before bedtime  · Elevate the head of your bed  Place 6-inch blocks under the head of your bed frame  You may also use more than one pillow under your head and shoulders while you sleep  · Maintain a healthy weight  If you are overweight, weight loss may help relieve symptoms of GERD  · Do not smoke  Smoking weakens the lower esophageal sphincter and increases the risk of GERD  Ask your healthcare provider for information if you currently smoke and need help to quit  E-cigarettes or smokeless tobacco still contain nicotine  Talk to your healthcare provider before you use these products  · Do not wear clothing that is tight around your waist   Tight clothing can put pressure on your stomach and cause or worsen GERD symptoms  Follow up with your doctor or gastroenterologist as directed:  Write down your questions so you remember to ask them during your visits  © Copyright [employer] 2021 Information is for End User's use only and may not be sold, redistributed or otherwise used for commercial purposes  All illustrations and images included in CareNotes® are the copyrighted property of [employer]  or [employer] Boucher   The above information is an  only  It is not intended as medical advice for individual conditions or treatments  Talk to your doctor, nurse or pharmacist before following any medical regimen to see if it is safe and effective for you

## 2021-09-08 ENCOUNTER — OFFICE VISIT (OUTPATIENT)
Dept: FAMILY MEDICINE CLINIC | Facility: CLINIC | Age: 31
End: 2021-09-08
Payer: COMMERCIAL

## 2021-09-08 VITALS
HEIGHT: 68 IN | SYSTOLIC BLOOD PRESSURE: 104 MMHG | HEART RATE: 92 BPM | DIASTOLIC BLOOD PRESSURE: 70 MMHG | TEMPERATURE: 98.1 F | OXYGEN SATURATION: 99 % | WEIGHT: 151 LBS | BODY MASS INDEX: 22.88 KG/M2

## 2021-09-08 DIAGNOSIS — Z23 ENCOUNTER FOR IMMUNIZATION: ICD-10-CM

## 2021-09-08 DIAGNOSIS — R53.83 FATIGUE, UNSPECIFIED TYPE: ICD-10-CM

## 2021-09-08 DIAGNOSIS — Z76.89 ENCOUNTER TO ESTABLISH CARE WITH NEW DOCTOR: Primary | ICD-10-CM

## 2021-09-08 DIAGNOSIS — Z11.59 ENCOUNTER FOR HEPATITIS C SCREENING TEST FOR LOW RISK PATIENT: ICD-10-CM

## 2021-09-08 DIAGNOSIS — K21.9 GASTROESOPHAGEAL REFLUX DISEASE, UNSPECIFIED WHETHER ESOPHAGITIS PRESENT: ICD-10-CM

## 2021-09-08 DIAGNOSIS — R63.4 ABNORMAL WEIGHT LOSS: ICD-10-CM

## 2021-09-08 PROCEDURE — 90471 IMMUNIZATION ADMIN: CPT

## 2021-09-08 PROCEDURE — 99204 OFFICE O/P NEW MOD 45 MIN: CPT | Performed by: FAMILY MEDICINE

## 2021-09-08 PROCEDURE — 90715 TDAP VACCINE 7 YRS/> IM: CPT

## 2021-09-08 RX ORDER — B-COMPLEX WITH VITAMIN C
TABLET ORAL
COMMUNITY
End: 2022-07-25 | Stop reason: ALTCHOICE

## 2021-09-08 NOTE — PROGRESS NOTES
Assessment/Plan:      Diagnoses and all orders for this visit:    Encounter to establish care with new doctor    Encounter for immunization  -     TDAP VACCINE GREATER THAN OR EQUAL TO 8YO IM    Encounter for hepatitis C screening test for low risk patient  -     Hepatitis C antibody; Future    Fatigue, unspecified type  -     Ferritin; Future    Abnormal weight loss  -     Celiac Antibodies Profile; Future  -     Lactose tolerance test; Future    Gastroesophageal reflux disease, unspecified whether esophagitis present  -     H  pylori antigen, stool; Future    Other orders  -     Zinc 100 MG TABS; Take by mouth          Return in about 4 weeks (around 10/6/2021) for lab follow up  The following portions of the patient's history were reviewed and updated as appropriate: allergies, current medications, past family history, past medical history, past social history, past surgical history, and problem list      Subjective:     Patient ID: Ancelmo Gant is a 33 y o  female  HPI    Ancelmo Gant presents today to establish care  Patient doing not well today  History was reviewed as below  Hemorrhoids: eats vegan and the hemorrhoids and sciatica, knee pain, thumb joints do not bother her anymore  Acid reflux: undiagnosed for many years cause she was told it was anxiety but she does not think it was that  She ended up with nodules on the vocal cords on larynx  She had EGD and followed diet but recently has been slacking with the diet    Racing heart, toruble breathing, intermittent nausea in march 2021  Fatigue and brain fog at the same time  Has been to the ED multiple times  Reports usually told anxiety but she knows what the symptoms are and that's not whats going on  Recent ED visit she started having chest pain and all the tests were negative and told her it was reflux  Hair falling out     covid J and J vaccine   Feels she is not neurotypical, feels things very deeply compared to others"    Good relationship with family  No SI/HI  Feels she has been depressed before but thinks she does not experience t in the same way with sadness, hopelessness  Would classify myself as a happy person  Happy with where I am at  Anorexia/bulimia- [age] anorexia, [age] bulimic  Got good help from 30years old she has been really good  Never had egd or colonoscopy  Never seen GI  IBS in sister  No IBD or celiac  Can peel finger nails off like paper  Terrible stomach pains when she was little  No abdominal pain currently but will get random pains sometimes  Sometimes eats  Acid watchers diet book  Wedge pillow for 2 years  Red meat was trigger so she stopped that  No constipation or diarrhea on disturbing basis  Past Medical History:   Diagnosis Date    Acid reflux     silent refulx    Anorexia     Bulimia     Hemorrhoids     MRSA infection 2014    in R underarm       Past Surgical History:   Procedure Laterality Date    NO PAST SURGERIES         Family History   Problem Relation Age of Onset    Deep vein thrombosis Mother     Gallbladder disease Mother     Hypertension Father     Coronary artery disease Father     Scoliosis Sister    [employer] ADD / ADHD Sister     Gallbladder disease Sister     Uterine cancer Maternal Grandmother     Breast cancer Other 61    Colon cancer Neg Hx     Ovarian cancer Neg Hx     Cervical cancer Neg Hx        Social History     Tobacco Use    Smoking status: Never Smoker    Smokeless tobacco: Never Used   Vaping Use    Vaping Use: Never used   Substance Use Topics    Alcohol use: Yes     Comment: cant drink because the reflux  owns a kitty so a few wine a few times a week       Drug use: No      Social History     Social History Narrative    Lives house (rent)- lives with boyfriend santos        Dog (blink) cat jospe        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, social media, upkeep, management       Allergies   Allergen Reactions    Sulfa Antibiotics Lip Swelling       Current Outpatient Medications on File Prior to Visit   Medication Sig Dispense Refill    Ascorbic Acid (vitamin C) 100 MG tablet Take 100 mg by mouth daily      cholecalciferol (VITAMIN D3) 1,000 units tablet Take 1,000 Units by mouth daily      ferrous sulfate 325 (65 Fe) mg tablet Take 325 mg by mouth daily with breakfast      PARAGARD INTRAUTERINE COPPER IU by Intrauterine route      Zinc 100 MG TABS Take by mouth       No current facility-administered medications on file prior to visit  Review of Systems      Objective:     Physical Exam  Vitals and nursing note reviewed  Constitutional:       General: She is not in acute distress  Appearance: Normal appearance  She is not ill-appearing  HENT:      Head: Normocephalic and atraumatic  Right Ear: Tympanic membrane, ear canal and external ear normal       Left Ear: Tympanic membrane, ear canal and external ear normal       Nose: Nose normal       Mouth/Throat:      Mouth: Mucous membranes are moist       Pharynx: Oropharynx is clear  No oropharyngeal exudate or posterior oropharyngeal erythema  Eyes:      Extraocular Movements: Extraocular movements intact  Conjunctiva/sclera: Conjunctivae normal       Pupils: Pupils are equal, round, and reactive to light  Cardiovascular:      Rate and Rhythm: Normal rate and regular rhythm  Pulses: Normal pulses  Heart sounds: Normal heart sounds  No murmur heard  No friction rub  No gallop  Pulmonary:      Effort: Pulmonary effort is normal  No respiratory distress  Breath sounds: Normal breath sounds  No wheezing or rales  Abdominal:      General: Bowel sounds are normal  There is no distension  Palpations: Abdomen is soft  Tenderness: There is no abdominal tenderness  There is no guarding  Musculoskeletal:         General: Normal range of motion  Cervical back: Neck supple  No muscular tenderness  Right lower leg: No edema  Left lower leg: No edema  Lymphadenopathy:      Cervical: No cervical adenopathy  Skin:     General: Skin is warm  Neurological:      General: No focal deficit present  Mental Status: She is alert and oriented to person, place, and time

## 2021-09-21 ENCOUNTER — TELEPHONE (OUTPATIENT)
Dept: SURGICAL ONCOLOGY | Facility: CLINIC | Age: 31
End: 2021-09-21

## 2021-12-20 DIAGNOSIS — R19.7 DIARRHEA, UNSPECIFIED TYPE: ICD-10-CM

## 2021-12-20 DIAGNOSIS — R05.9 COUGH: Primary | ICD-10-CM

## 2021-12-20 DIAGNOSIS — Z20.822 CLOSE EXPOSURE TO COVID-19 VIRUS: ICD-10-CM

## 2021-12-20 DIAGNOSIS — R11.0 NAUSEA: ICD-10-CM

## 2021-12-20 PROCEDURE — U0005 INFEC AGEN DETEC AMPLI PROBE: HCPCS | Performed by: FAMILY MEDICINE

## 2021-12-20 PROCEDURE — U0003 INFECTIOUS AGENT DETECTION BY NUCLEIC ACID (DNA OR RNA); SEVERE ACUTE RESPIRATORY SYNDROME CORONAVIRUS 2 (SARS-COV-2) (CORONAVIRUS DISEASE [COVID-19]), AMPLIFIED PROBE TECHNIQUE, MAKING USE OF HIGH THROUGHPUT TECHNOLOGIES AS DESCRIBED BY CMS-2020-01-R: HCPCS | Performed by: FAMILY MEDICINE

## 2021-12-29 ENCOUNTER — IMMUNIZATIONS (OUTPATIENT)
Dept: FAMILY MEDICINE CLINIC | Facility: HOSPITAL | Age: 31
End: 2021-12-29

## 2021-12-29 DIAGNOSIS — Z23 ENCOUNTER FOR IMMUNIZATION: Primary | ICD-10-CM

## 2021-12-29 PROCEDURE — 0001A COVID-19 PFIZER VACC 0.3 ML: CPT

## 2021-12-29 PROCEDURE — 91300 COVID-19 PFIZER VACC 0.3 ML: CPT

## 2022-02-24 ENCOUNTER — TELEPHONE (OUTPATIENT)
Dept: PSYCHIATRY | Facility: CLINIC | Age: 32
End: 2022-02-24

## 2022-02-24 NOTE — TELEPHONE ENCOUNTER
Pt called I placed her on the wait list  Needs someone good with ptsd  Was thinking Dell Louise, or Elisha Cade  Is open to vv  Anything to get her therapy

## 2022-05-11 ENCOUNTER — OFFICE VISIT (OUTPATIENT)
Dept: URGENT CARE | Facility: CLINIC | Age: 32
End: 2022-05-11
Payer: COMMERCIAL

## 2022-05-11 VITALS
SYSTOLIC BLOOD PRESSURE: 104 MMHG | RESPIRATION RATE: 18 BRPM | DIASTOLIC BLOOD PRESSURE: 57 MMHG | HEART RATE: 78 BPM | OXYGEN SATURATION: 99 % | TEMPERATURE: 98.7 F

## 2022-05-11 DIAGNOSIS — N39.0 URINARY TRACT INFECTION WITH HEMATURIA, SITE UNSPECIFIED: Primary | ICD-10-CM

## 2022-05-11 DIAGNOSIS — R31.9 URINARY TRACT INFECTION WITH HEMATURIA, SITE UNSPECIFIED: Primary | ICD-10-CM

## 2022-05-11 LAB
SL AMB  POCT GLUCOSE, UA: ABNORMAL
SL AMB LEUKOCYTE ESTERASE,UA: ABNORMAL
SL AMB POCT BILIRUBIN,UA: ABNORMAL
SL AMB POCT BLOOD,UA: ABNORMAL
SL AMB POCT CLARITY,UA: ABNORMAL
SL AMB POCT COLOR,UA: ABNORMAL
SL AMB POCT KETONES,UA: ABNORMAL
SL AMB POCT NITRITE,UA: ABNORMAL
SL AMB POCT PH,UA: 5
SL AMB POCT SPECIFIC GRAVITY,UA: 1.03
SL AMB POCT URINE HCG: NEGATIVE
SL AMB POCT URINE PROTEIN: ABNORMAL
SL AMB POCT UROBILINOGEN: ABNORMAL

## 2022-05-11 PROCEDURE — 81025 URINE PREGNANCY TEST: CPT

## 2022-05-11 PROCEDURE — 81002 URINALYSIS NONAUTO W/O SCOPE: CPT

## 2022-05-11 PROCEDURE — 99213 OFFICE O/P EST LOW 20 MIN: CPT

## 2022-05-11 PROCEDURE — 87086 URINE CULTURE/COLONY COUNT: CPT

## 2022-05-11 RX ORDER — CEPHALEXIN 500 MG/1
500 CAPSULE ORAL EVERY 12 HOURS SCHEDULED
Qty: 14 CAPSULE | Refills: 0 | Status: SHIPPED | OUTPATIENT
Start: 2022-05-11 | End: 2022-05-18

## 2022-05-11 NOTE — PROGRESS NOTES
[facility]        NAME: Tyrell Osborn is a 33 y o  female  : 1990    MRN: [id number]  DATE: May 11, 2022  TIME: 4:52 PM      Assessment and Plan     UTI symptoms [R39 9]  1  UTI symptoms  POCT urine HCG    POCT urine dip       poct urine dip shows moderate amount of leukocytes and large amount of blood  Patient does have her period   Urine culture sent  poct hcg negative    Patient Instructions   Take antibiotic as prescribed  Recommend probiotic use while taking antibiotic  Do not take Azo for longer than 2-3 days  Acetaminophen or ibuprofen for fever and pain  Follow-up with PCP in 3-5 days  Go to ER if symptoms worsen  Chief Complaint     No chief complaint on file  History of Present Illness     Patient is a 78-year-old female who presents with urinary burning and frequency for 3-4 days  States she started with her period two days ago  Reports pain in her pelvic area  Denies abdominal pain  Denies cramping  Denies flank pain  Denies n/v/d  Denies fever or chills  Reports hx of UTI's states she has taken keflex in the past with no complications  States she has been taking azo for the dysuria  Review of Systems     Review of Systems   Constitutional: Negative for chills and fever  Gastrointestinal: Negative for diarrhea, nausea and vomiting  Genitourinary: Positive for dysuria, frequency, pelvic pain, urgency and vaginal bleeding (has her period)  Negative for flank pain  All other systems reviewed and are negative          Current Medications       Current Outpatient Medications:     Ascorbic Acid (vitamin C) 100 MG tablet, Take 100 mg by mouth daily, Disp: , Rfl:     cholecalciferol (VITAMIN D3) 1,000 units tablet, Take 1,000 Units by mouth daily, Disp: , Rfl:     ferrous sulfate 325 (65 Fe) mg tablet, Take 325 mg by mouth daily with breakfast, Disp: , Rfl:     PARAGARD INTRAUTERINE COPPER IU, by Intrauterine route, Disp: , Rfl:     Zinc 100 MG TABS, Take by mouth, Disp: , Rfl:     Current Allergies     Allergies as of 05/11/2022 - Reviewed 09/08/2021   Allergen Reaction Noted    Sulfa antibiotics Lip Swelling 11/28/2018              The following portions of the patient's history were reviewed and updated as appropriate: allergies, current medications, past family history, past medical history, past social history, past surgical history and problem list      Past Medical History:   Diagnosis Date    Acid reflux     silent refulx    Anorexia     Bulimia     Hemorrhoids     MRSA infection 2014    in R underarm       Past Surgical History:   Procedure Laterality Date    NO PAST SURGERIES         Family History   Problem Relation Age of Onset    Deep vein thrombosis Mother     Gallbladder disease Mother     Hypertension Father     Coronary artery disease Father     Scoliosis Sister     ADD / ADHD Sister     Gallbladder disease Sister     Uterine cancer Maternal Grandmother     Breast cancer Other 61    Colon cancer Neg Hx     Ovarian cancer Neg Hx     Cervical cancer Neg Hx          Medications have been verified  Objective     There were no vitals taken for this visit  No LMP recorded  (Menstrual status: Birth Control)  Physical Exam     Physical Exam  Vitals and nursing note reviewed  Constitutional:       General: She is awake  She is not in acute distress  Appearance: Normal appearance  She is not ill-appearing, toxic-appearing or diaphoretic  Cardiovascular:      Rate and Rhythm: Normal rate  Pulses: Normal pulses  Heart sounds: Normal heart sounds, S1 normal and S2 normal    Pulmonary:      Effort: Pulmonary effort is normal       Breath sounds: Normal breath sounds and air entry  Abdominal:      General: Abdomen is flat  Bowel sounds are normal       Palpations: Abdomen is soft  Tenderness: There is no abdominal tenderness  There is no right CVA tenderness, left CVA tenderness, guarding or rebound  Skin:     General: Skin is warm  Capillary Refill: Capillary refill takes less than 2 seconds  Neurological:      Mental Status: She is alert  Psychiatric:         Mood and Affect: Mood normal          Behavior: Behavior normal          Thought Content:  Thought content normal          Judgment: Judgment normal

## 2022-05-11 NOTE — PATIENT INSTRUCTIONS
Take antibiotic as prescribed  Recommend probiotic use while taking antibiotic  Do not take Azo for longer than 2-3 days  Acetaminophen or ibuprofen for fever and pain  Follow-up with PCP in 3-5 days  Go to ER if symptoms worsen

## 2022-05-13 LAB — BACTERIA UR CULT: NORMAL

## 2022-06-28 ENCOUNTER — OFFICE VISIT (OUTPATIENT)
Dept: URGENT CARE | Facility: CLINIC | Age: 32
End: 2022-06-28
Payer: COMMERCIAL

## 2022-06-28 VITALS
HEIGHT: 68 IN | HEART RATE: 65 BPM | BODY MASS INDEX: 22.89 KG/M2 | DIASTOLIC BLOOD PRESSURE: 69 MMHG | RESPIRATION RATE: 16 BRPM | OXYGEN SATURATION: 99 % | TEMPERATURE: 97.7 F | SYSTOLIC BLOOD PRESSURE: 111 MMHG | WEIGHT: 151.01 LBS

## 2022-06-28 DIAGNOSIS — R30.0 DYSURIA: ICD-10-CM

## 2022-06-28 DIAGNOSIS — N30.00 ACUTE CYSTITIS WITHOUT HEMATURIA: Primary | ICD-10-CM

## 2022-06-28 LAB
SL AMB  POCT GLUCOSE, UA: ABNORMAL
SL AMB LEUKOCYTE ESTERASE,UA: ABNORMAL
SL AMB POCT BILIRUBIN,UA: ABNORMAL
SL AMB POCT BLOOD,UA: ABNORMAL
SL AMB POCT CLARITY,UA: CLEAR
SL AMB POCT COLOR,UA: YELLOW
SL AMB POCT KETONES,UA: ABNORMAL
SL AMB POCT NITRITE,UA: ABNORMAL
SL AMB POCT PH,UA: 7
SL AMB POCT SPECIFIC GRAVITY,UA: 1.01
SL AMB POCT URINE HCG: NORMAL
SL AMB POCT URINE PROTEIN: ABNORMAL
SL AMB POCT UROBILINOGEN: 0.2

## 2022-06-28 PROCEDURE — 81002 URINALYSIS NONAUTO W/O SCOPE: CPT | Performed by: NURSE PRACTITIONER

## 2022-06-28 PROCEDURE — 87086 URINE CULTURE/COLONY COUNT: CPT | Performed by: NURSE PRACTITIONER

## 2022-06-28 PROCEDURE — 99213 OFFICE O/P EST LOW 20 MIN: CPT | Performed by: NURSE PRACTITIONER

## 2022-06-28 PROCEDURE — 81025 URINE PREGNANCY TEST: CPT | Performed by: NURSE PRACTITIONER

## 2022-06-28 RX ORDER — NITROFURANTOIN 25; 75 MG/1; MG/1
100 CAPSULE ORAL 2 TIMES DAILY
Qty: 10 CAPSULE | Refills: 0 | Status: SHIPPED | OUTPATIENT
Start: 2022-06-28 | End: 2022-07-03

## 2022-06-28 NOTE — PROGRESS NOTES
[facility]        NAME: Mathew Guevara is a 27 y o  female  : 1990    MRN: [id number]  DATE: 2022  TIME: 1:22 PM    Assessment and Plan   Acute cystitis without hematuria [N30 00]  1  Acute cystitis without hematuria  nitrofurantoin (MACROBID) 100 mg capsule   2  Dysuria  POCT urine dip    POCT urine HCG    Urine culture    nitrofurantoin (MACROBID) 100 mg capsule         Patient Instructions   Likely UTI based on dip stick results- will send for culture  Last urine specimen from last month negative for infection- positive contaminants  Macrobid prescribed- complete course of antibiotic  Take azo for symptoms  Hydrate  Consider seeing OBGYN if symptoms persist   Follow up with PCP  Go to ED if symptoms acutely worsen  Follow up with PCP in 3-5 days  Proceed to  ER if symptoms worsen  Chief Complaint     Chief Complaint   Patient presents with    Possible UTI         History of Present Illness       Pt is a 28 yo female who presents with UTI symptoms that began a month ago  Pt reports she went to an urgent care a month ago when symptoms began,  was put on keflex for a UTI  Pt reports that her symptoms failed to improve, and have persisted for a month  Pt reports urinary urgency, frequency, hesitancy, dysuria, pelvic pressure  Denies N/V/D, fever, chills, blood in her urine, malodor, flank pain  Reports she has had frequent UTIs and BV in the past  Has had the same sexual partner for years  Review of Systems   Review of Systems   Constitutional: Negative  HENT: Negative  Eyes: Negative  Respiratory: Negative  Cardiovascular: Negative  Gastrointestinal: Positive for abdominal pain  Pelvic pressure     Endocrine: Negative  Genitourinary: Positive for dysuria, frequency and urgency  Negative for flank pain and vaginal discharge  Musculoskeletal: Negative  Neurological: Negative  Psychiatric/Behavioral: Negative            Current Medications Current Outpatient Medications:     nitrofurantoin (MACROBID) 100 mg capsule, Take 1 capsule (100 mg total) by mouth 2 (two) times a day for 5 days, Disp: 10 capsule, Rfl: 0    Ascorbic Acid (vitamin C) 100 MG tablet, Take 100 mg by mouth daily (Patient not taking: Reported on 5/11/2022), Disp: , Rfl:     cholecalciferol (VITAMIN D3) 1,000 units tablet, Take 1,000 Units by mouth daily (Patient not taking: Reported on 5/11/2022), Disp: , Rfl:     ferrous sulfate 325 (65 Fe) mg tablet, Take 325 mg by mouth daily with breakfast (Patient not taking: Reported on 5/11/2022), Disp: , Rfl:     PARAGARD INTRAUTERINE COPPER IU, by Intrauterine route, Disp: , Rfl:     Zinc 100 MG TABS, Take by mouth (Patient not taking: Reported on 5/11/2022), Disp: , Rfl:     Current Allergies     Allergies as of 06/28/2022 - Reviewed 05/11/2022   Allergen Reaction Noted    Sulfa antibiotics Lip Swelling 11/28/2018            The following portions of the patient's history were reviewed and updated as appropriate: allergies, current medications, past family history, past medical history, past social history, past surgical history and problem list      Past Medical History:   Diagnosis Date    Acid reflux     silent refulx    Anorexia     Bulimia     Hemorrhoids     MRSA infection 2014    in R underarm       Past Surgical History:   Procedure Laterality Date    NO PAST SURGERIES         Family History   Problem Relation Age of Onset    Deep vein thrombosis Mother     Gallbladder disease Mother     Hypertension Father     Coronary artery disease Father     Scoliosis Sister     ADD / ADHD Sister     Gallbladder disease Sister     Uterine cancer Maternal Grandmother     Breast cancer Other 61    Colon cancer Neg Hx     Ovarian cancer Neg Hx     Cervical cancer Neg Hx          Medications have been verified          Objective   /69   Pulse 65   Temp 97 7 °F (36 5 °C)   Resp 16   Ht 5' 8" (1 727 m)   Wt 68 5 kg (151 lb 0 2 oz)   SpO2 99%   BMI 22 96 kg/m²   No LMP recorded  Physical Exam     Physical Exam  Vitals and nursing note reviewed  Constitutional:       Appearance: Normal appearance  She is normal weight  HENT:      Head: Normocephalic and atraumatic  Right Ear: External ear normal       Left Ear: External ear normal       Nose: Nose normal       Mouth/Throat:      Mouth: Mucous membranes are moist       Pharynx: Oropharynx is clear  Eyes:      Extraocular Movements: Extraocular movements intact  Conjunctiva/sclera: Conjunctivae normal       Pupils: Pupils are equal, round, and reactive to light  Cardiovascular:      Rate and Rhythm: Normal rate  Pulses: Normal pulses  Heart sounds: Normal heart sounds  Pulmonary:      Effort: Pulmonary effort is normal       Breath sounds: Normal breath sounds  Abdominal:      General: Abdomen is flat  Bowel sounds are normal       Palpations: Abdomen is soft  Tenderness: There is abdominal tenderness  There is no right CVA tenderness or left CVA tenderness  Musculoskeletal:         General: Normal range of motion  Cervical back: Normal range of motion and neck supple  Skin:     General: Skin is warm  Capillary Refill: Capillary refill takes less than 2 seconds  Neurological:      General: No focal deficit present  Mental Status: She is alert  Psychiatric:         Mood and Affect: Mood normal          Behavior: Behavior normal          Thought Content:  Thought content normal          Judgment: Judgment normal              poct urine with some blood and large leuks  Neg pregnancy  Last urine culture 5/11/2022 and with 80-89,000 contaminants

## 2022-06-28 NOTE — PATIENT INSTRUCTIONS
Likely UTI based on dip stick results- will send for culture  Last urine specimen from last month negative for infection- positive contaminants  Macrobid prescribed- complete course of antibiotic  Take azo for symptoms  Hydrate  Consider seeing OBGYN if symptoms persist   Follow up with PCP  Go to ED if symptoms acutely worsen

## 2022-06-30 LAB — BACTERIA UR CULT: NORMAL

## 2022-07-05 ENCOUNTER — TELEPHONE (OUTPATIENT)
Dept: URGENT CARE | Facility: CLINIC | Age: 32
End: 2022-07-05

## 2022-07-05 NOTE — TELEPHONE ENCOUNTER
Pt called and states that she is not better after macrobid usage for UTI symptoms  UA culture negative   Pt instructed to follow up with PCP

## 2022-07-25 ENCOUNTER — OFFICE VISIT (OUTPATIENT)
Dept: OBGYN CLINIC | Facility: MEDICAL CENTER | Age: 32
End: 2022-07-25
Payer: COMMERCIAL

## 2022-07-25 VITALS — SYSTOLIC BLOOD PRESSURE: 120 MMHG | WEIGHT: 173.4 LBS | BODY MASS INDEX: 26.37 KG/M2 | DIASTOLIC BLOOD PRESSURE: 72 MMHG

## 2022-07-25 DIAGNOSIS — B96.89 BV (BACTERIAL VAGINOSIS): ICD-10-CM

## 2022-07-25 DIAGNOSIS — R10.2 PELVIC PAIN: ICD-10-CM

## 2022-07-25 DIAGNOSIS — N93.9 ABNORMAL UTERINE BLEEDING: ICD-10-CM

## 2022-07-25 DIAGNOSIS — N76.0 BV (BACTERIAL VAGINOSIS): ICD-10-CM

## 2022-07-25 DIAGNOSIS — Z11.3 SCREEN FOR STD (SEXUALLY TRANSMITTED DISEASE): Primary | ICD-10-CM

## 2022-07-25 DIAGNOSIS — R35.0 URINARY FREQUENCY: ICD-10-CM

## 2022-07-25 PROBLEM — K21.9 LPRD (LARYNGOPHARYNGEAL REFLUX DISEASE): Status: ACTIVE | Noted: 2019-10-18

## 2022-07-25 PROCEDURE — 99213 OFFICE O/P EST LOW 20 MIN: CPT | Performed by: PHYSICIAN ASSISTANT

## 2022-07-25 PROCEDURE — 87591 N.GONORRHOEAE DNA AMP PROB: CPT | Performed by: PHYSICIAN ASSISTANT

## 2022-07-25 PROCEDURE — 87491 CHLMYD TRACH DNA AMP PROBE: CPT | Performed by: PHYSICIAN ASSISTANT

## 2022-07-25 RX ORDER — METRONIDAZOLE 500 MG/1
500 TABLET ORAL EVERY 12 HOURS SCHEDULED
Qty: 14 TABLET | Refills: 0 | Status: SHIPPED | OUTPATIENT
Start: 2022-07-25 | End: 2022-08-01

## 2022-07-25 NOTE — PROGRESS NOTES
Evette FERNANDEZ Sigifredo  1990    S:  27 y o  female here for a problem visit  She complains of pelvic cramping and discomfort off and on for months; there is no pattern to her discomfort  She gets a clear vaginal discharge with a pink tinge occasionally throughout the month without a pattern    Her cycles themselves are regular, not heavy or particularly crampy    Sexually active with the same partner for years, ok with STD cultures     Notes urinary frequency in small amounts and urgency and has had multiple negative urinalyses/urine cultures    Past Medical History:   Diagnosis Date    Acid reflux     silent refulx    Anorexia     Bulimia     Hemorrhoids     MRSA infection 2014    in R underarm     Family History   Problem Relation Age of Onset    Deep vein thrombosis Mother     Gallbladder disease Mother     Hypertension Father     Coronary artery disease Father     Scoliosis Sister    [de-identified] ADD / ADHD Sister     Gallbladder disease Sister     Uterine cancer Maternal Grandmother     Breast cancer Other 61    Colon cancer Neg Hx     Ovarian cancer Neg Hx     Cervical cancer Neg Hx      Social History     Socioeconomic History    Marital status: Single     Spouse name: None    Number of children: None    Years of education: None    Highest education level: Bachelor's degree (e g , BA, AB, BS)   Occupational History    Occupation: self employed   Tobacco Use    Smoking status: Never Smoker    Smokeless tobacco: Never Used   Vaping Use    Vaping Use: Never used   Substance and Sexual Activity    Alcohol use: Yes     Comment: cant drink because the reflux  owns a kitty so a few wine a few times a week   Drug use: No    Sexual activity: Yes     Birth control/protection: I U D       Comment: Paragard 12/2017   Other Topics Concern    None   Social History Narrative    Lives house (rent)- lives with boyfriend santos        Dog (blink) cat josep        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, social media, upkeep, management     Social Determinants of Health     Financial Resource Strain: Not on file   Food Insecurity: Not on file   Transportation Needs: Not on file   Physical Activity: Not on file   Stress: Not on file   Social Connections: Not on file   Intimate Partner Violence: Not on file   Housing Stability: Not on file       Review of Systems   Respiratory: Negative  Cardiovascular: Negative  Gastrointestinal: Negative for constipation and diarrhea  Genitourinary: Negative for difficulty urinating, pelvic pain, vaginal bleeding, vaginal discharge, itching or odor  O:  /72 (BP Location: Right arm, Patient Position: Sitting, Cuff Size: Standard)   Wt 78 7 kg (173 lb 6 4 oz)   LMP 07/05/2022 (Exact Date)   BMI 26 37 kg/m²   She appears well and is in no distress  Abdomen is soft and nontender  External genitals are normal without lesions or rashes  Vagina has moderate thin white discharge   Cervix is normal, no lesions or discharge  Uterus is nontender, no masses  Adnexa are nontender, no pelvic masses appreciated    Wet mount reveals BV     A/P: BV  Flagyl 500mg po BID x 7 days  Check GC/chlamydia  If pain/cramping/irregular bleeding does not resolve, will check pelvic ultrasound and TSH   Call in 2 weeks if not all better

## 2022-07-26 LAB
C TRACH DNA SPEC QL NAA+PROBE: NEGATIVE
N GONORRHOEA DNA SPEC QL NAA+PROBE: NEGATIVE

## 2023-04-25 ENCOUNTER — OFFICE VISIT (OUTPATIENT)
Dept: FAMILY MEDICINE CLINIC | Facility: CLINIC | Age: 33
End: 2023-04-25

## 2023-04-25 VITALS
OXYGEN SATURATION: 98 % | HEART RATE: 69 BPM | SYSTOLIC BLOOD PRESSURE: 116 MMHG | TEMPERATURE: 98.6 F | WEIGHT: 185.2 LBS | DIASTOLIC BLOOD PRESSURE: 68 MMHG | BODY MASS INDEX: 28.07 KG/M2 | HEIGHT: 68 IN

## 2023-04-25 DIAGNOSIS — F43.89 STRESS REACTION, CHRONIC: ICD-10-CM

## 2023-04-25 DIAGNOSIS — K21.9 GASTROESOPHAGEAL REFLUX DISEASE, UNSPECIFIED WHETHER ESOPHAGITIS PRESENT: Primary | ICD-10-CM

## 2023-04-25 RX ORDER — FAMOTIDINE 20 MG/1
20 TABLET, FILM COATED ORAL DAILY
Qty: 30 TABLET | Refills: 0 | Status: SHIPPED | OUTPATIENT
Start: 2023-04-25 | End: 2023-05-25
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
## 2023-04-27 ENCOUNTER — OFFICE VISIT (OUTPATIENT)
Dept: GASTROENTEROLOGY | Facility: CLINIC | Age: 33
End: 2023-04-27

## 2023-04-27 VITALS
HEART RATE: 76 BPM | OXYGEN SATURATION: 99 % | HEIGHT: 68 IN | WEIGHT: 188 LBS | SYSTOLIC BLOOD PRESSURE: 110 MMHG | BODY MASS INDEX: 28.49 KG/M2 | DIASTOLIC BLOOD PRESSURE: 66 MMHG | RESPIRATION RATE: 18 BRPM

## 2023-04-27 DIAGNOSIS — R11.0 NAUSEA: ICD-10-CM

## 2023-04-27 DIAGNOSIS — K21.9 GASTROESOPHAGEAL REFLUX DISEASE, UNSPECIFIED WHETHER ESOPHAGITIS PRESENT: Primary | ICD-10-CM

## 2023-04-27 DIAGNOSIS — Z80.0 FAMILY HISTORY OF COLON CANCER: ICD-10-CM

## 2023-04-27 DIAGNOSIS — K21.9 LARYNGOPHARYNGEAL REFLUX (LPR): ICD-10-CM

## 2023-04-27 NOTE — PATIENT INSTRUCTIONS
Scheduled date of EGD(as of today):05/19/2023  Physician performing EGD:Qiana  Location of EGD:[facility]  Instructions reviewed with patient by:Johanny  Clearances: None

## 2023-04-27 NOTE — PROGRESS NOTES
[facility] - Outpatient Consultation  Jacqueline Wilder 27 y o  female MRN: [id number]  Encounter: [id number]          ASSESSMENT AND PLAN:    32F with LPR here to establish GI care  Has never had GI workup  Given her severe symptoms and that she has not tolerated PPI, will get EGD to assess for esophagitis, Elam's, HH, PUD, HP, etc     1  Gastroesophageal reflux disease, unspecified whether esophagitis present  2  Nausea  3  Laryngopharyngeal reflux (LPR)  - Ambulatory Referral to Gastroenterology  - EGD; Future  - Trial famotidine 20 mg as prescribed by PCP  If no side effects on daily dosing, would increase to twice daily after 1 week  - Continue dietary modifications  - Discussed that if workup is consistent with pathologic acid reflux, there are endoscopic and surgical procedures to address this but given her frequent nausea/vomiting, she may not tolerate fundoplication    4  Family history of colon cancer  Reports paternal grandfather had early onset colon cancer  Will need to clarify whether her father has had advanced adenomas to determine whether to start screening at age 36 or 39       ______________________________________________________________________    HPI:    Was bulemic in her teens and wonders if that messed up her GI track  More recently, has had issues with nausea and voice loss  Was diagnosed with LPR by ENT  Switched to acid reduction diet  Nausea improved on low acid diet but still doesn't feel well  Voice still has not recovered  Feels bubbling up in chest   Tried pantoprazole, omeprazole and gets severe nausea from them  Feels like food gets stuck in chest   No melena or unintentional weight loss  Rare ibuprofen use  Just prescribed famotidine but has not tried it yet  Paternal grandfather with colon cancer at young age  Ferritin is low at 13  Not anemic    Has paraguard IUD which makes cycles a little heavier, typical heavy flow for 3-4 days, some spotting for another 3-4 days    REVIEW OF SYSTEMS:    CONSTITUTIONAL: Denies any fever, chills, rigors, and weight loss  HEENT: No earache or tinnitus  Denies hearing loss or visual disturbances  CARDIOVASCULAR: No chest pain or palpitations  RESPIRATORY: Denies any cough, hemoptysis, shortness of breath or dyspnea on exertion  GASTROINTESTINAL: As noted in the History of Present Illness  GENITOURINARY: No problems with urination  Denies any hematuria or dysuria  NEUROLOGIC: No dizziness or vertigo, denies headaches  MUSCULOSKELETAL: Denies any muscle or joint pain  SKIN: Denies skin rashes or itching  ENDOCRINE: Denies excessive thirst  Denies intolerance to heat or cold  PSYCHOSOCIAL: Denies depression or anxiety  Denies any recent memory loss  Historical Information   Past Medical History:   Diagnosis Date   • Acid reflux     silent refulx   • Anorexia    • Bulimia    • Hemorrhoids    • MRSA infection 2014    in R underarm     Past Surgical History:   Procedure Laterality Date   • NO PAST SURGERIES       Social History   Social History     Substance and Sexual Activity   Alcohol Use Yes   • Alcohol/week: 5 0 standard drinks   • Types: 5 Glasses of wine per week    Comment: cant drink because the reflux  owns a kitty so a few wine a few times a week        Social History     Substance and Sexual Activity   Drug Use No     Social History     Tobacco Use   Smoking Status Never   Smokeless Tobacco Never     Family History   Problem Relation Age of Onset   • Deep vein thrombosis Mother    • Gallbladder disease Mother    • Hypertension Father    • Coronary artery disease Father    • Scoliosis Sister    • ADD / ADHD Sister    • Gallbladder disease Sister    • Uterine cancer Maternal Grandmother    • Breast cancer Other 61   • Colon cancer Neg Hx    • Ovarian cancer Neg Hx    • Cervical cancer Neg Hx        Meds/Allergies       Current Outpatient Medications:   •  Ashwagandha 125 MG CAPS  • "famotidine (PEPCID) 20 mg tablet  •  PARAGARD INTRAUTERINE COPPER IU    Allergies   Allergen Reactions   • Sulfa Antibiotics Lip Swelling           Objective     Blood pressure 110/66, pulse 76, resp  rate 18, height 5' 8\" (1 727 m), weight 85 3 kg (188 lb), SpO2 99 %, not currently breastfeeding  Body mass index is 28 59 kg/m²  PHYSICAL EXAM:      General Appearance:   Alert, cooperative, no distress   HEENT:   Normocephalic, atraumatic, anicteric  Neck:  Supple, symmetrical, trachea midline   Lungs:   Clear to auscultation bilaterally; no rales, rhonchi or wheezing; respirations unlabored    Heart[de-identified]   Regular rate and rhythm; no murmur, rub, or gallop  Abdomen:   Soft, non-tender, non-distended; normal bowel sounds; no masses, no organomegaly    Genitalia:   Deferred    Rectal:   Deferred    Extremities:  No cyanosis, clubbing or edema    Pulses:  2+ and symmetric    Skin:  No jaundice, rashes, or lesions    Lymph nodes:  No palpable cervical lymphadenopathy        Lab Results:   No visits with results within 1 Day(s) from this visit     Latest known visit with results is:   Appointment on 04/17/2023   Component Date Value   • TSH 3RD GENERATON 04/17/2023 0 694    • Hepatitis C Ab 04/17/2023 Non-reactive    • Sodium 04/17/2023 137    • Potassium 04/17/2023 3 7    • Chloride 04/17/2023 108    • CO2 04/17/2023 27    • ANION GAP 04/17/2023 2 (L)    • BUN 04/17/2023 10    • Creatinine 04/17/2023 0 65    • Glucose, Fasting 04/17/2023 73    • Calcium 04/17/2023 9 0    • AST 04/17/2023 18    • ALT 04/17/2023 16    • Alkaline Phosphatase 04/17/2023 60    • Total Protein 04/17/2023 7 2    • Albumin 04/17/2023 3 7    • Total Bilirubin 04/17/2023 0 51    • eGFR 04/17/2023 116    • WBC 04/17/2023 6 76    • RBC 04/17/2023 4 48    • Hemoglobin 04/17/2023 13 6    • Hematocrit 04/17/2023 44 2    • MCV 04/17/2023 99 (H)    • MCH 04/17/2023 30 4    • MCHC 04/17/2023 30 8 (L)    • RDW 04/17/2023 12 5    • MPV 04/17/2023 " 10 8    • Platelets 60/45/2274 347    • nRBC 04/17/2023 0    • Neutrophils Relative 04/17/2023 56    • Immat GRANS % 04/17/2023 0    • Lymphocytes Relative 04/17/2023 30    • Monocytes Relative 04/17/2023 9    • Eosinophils Relative 04/17/2023 4    • Basophils Relative 04/17/2023 1    • Neutrophils Absolute 04/17/2023 3 83    • Immature Grans Absolute 04/17/2023 0 03    • Lymphocytes Absolute 04/17/2023 2 01    • Monocytes Absolute 04/17/2023 0 58    • Eosinophils Absolute 04/17/2023 0 25    • Basophils Absolute 04/17/2023 0 06    • Cholesterol 04/17/2023 210 (H)    • Triglycerides 04/17/2023 110    • HDL, Direct 04/17/2023 53    • LDL Calculated 04/17/2023 135 (H)    • Non-HDL-Chol (CHOL-HDL) 04/17/2023 157    • Iron Saturation 04/17/2023 32    • TIBC 04/17/2023 353    • Iron 04/17/2023 113    • Ferritin 04/17/2023 15          Radiology Results:   No results found

## 2023-05-19 ENCOUNTER — ANESTHESIA (OUTPATIENT)
Dept: GASTROENTEROLOGY | Facility: HOSPITAL | Age: 33
End: 2023-05-19

## 2023-05-19 ENCOUNTER — ANESTHESIA EVENT (OUTPATIENT)
Dept: GASTROENTEROLOGY | Facility: HOSPITAL | Age: 33
End: 2023-05-19

## 2023-05-19 ENCOUNTER — HOSPITAL ENCOUNTER (OUTPATIENT)
Dept: GASTROENTEROLOGY | Facility: HOSPITAL | Age: 33
Setting detail: OUTPATIENT SURGERY
End: 2023-05-19
Attending: STUDENT IN AN ORGANIZED HEALTH CARE EDUCATION/TRAINING PROGRAM

## 2023-05-19 VITALS
OXYGEN SATURATION: 98 % | SYSTOLIC BLOOD PRESSURE: 122 MMHG | DIASTOLIC BLOOD PRESSURE: 75 MMHG | RESPIRATION RATE: 16 BRPM | HEART RATE: 89 BPM | TEMPERATURE: 96.8 F

## 2023-05-19 DIAGNOSIS — R11.0 NAUSEA: ICD-10-CM

## 2023-05-19 DIAGNOSIS — K21.9 GASTROESOPHAGEAL REFLUX DISEASE, UNSPECIFIED WHETHER ESOPHAGITIS PRESENT: ICD-10-CM

## 2023-05-19 LAB
EXT PREGNANCY TEST URINE: NEGATIVE
EXT. CONTROL: NORMAL

## 2023-05-19 RX ORDER — SODIUM CHLORIDE, SODIUM LACTATE, POTASSIUM CHLORIDE, CALCIUM CHLORIDE 600; 310; 30; 20 MG/100ML; MG/100ML; MG/100ML; MG/100ML
125 INJECTION, SOLUTION INTRAVENOUS CONTINUOUS
Status: DISCONTINUED | OUTPATIENT
Start: 2023-05-19 | End: 2023-05-19

## 2023-05-19 RX ORDER — LIDOCAINE HYDROCHLORIDE 20 MG/ML
INJECTION, SOLUTION EPIDURAL; INFILTRATION; INTRACAUDAL; PERINEURAL AS NEEDED
Status: DISCONTINUED | OUTPATIENT
Start: 2023-05-19 | End: 2023-05-19

## 2023-05-19 RX ORDER — GLYCOPYRROLATE 0.2 MG/ML
INJECTION INTRAMUSCULAR; INTRAVENOUS AS NEEDED
Status: DISCONTINUED | OUTPATIENT
Start: 2023-05-19 | End: 2023-05-19

## 2023-05-19 RX ORDER — OMEPRAZOLE 40 MG/1
40 CAPSULE, DELAYED RELEASE ORAL DAILY
Qty: 30 CAPSULE | Refills: 5 | Status: SHIPPED | OUTPATIENT
Start: 2023-05-19 | End: 2023-11-15

## 2023-05-19 RX ORDER — PROPOFOL 10 MG/ML
INJECTION, EMULSION INTRAVENOUS AS NEEDED
Status: DISCONTINUED | OUTPATIENT
Start: 2023-05-19 | End: 2023-05-19

## 2023-05-19 RX ADMIN — PROPOFOL 50 MG: 10 INJECTION, EMULSION INTRAVENOUS at 10:36

## 2023-05-19 RX ADMIN — SODIUM CHLORIDE, SODIUM LACTATE, POTASSIUM CHLORIDE, AND CALCIUM CHLORIDE: .6; .31; .03; .02 INJECTION, SOLUTION INTRAVENOUS at 10:06

## 2023-05-19 RX ADMIN — PROPOFOL 50 MG: 10 INJECTION, EMULSION INTRAVENOUS at 10:38

## 2023-05-19 RX ADMIN — LIDOCAINE HYDROCHLORIDE 100 MG: 20 INJECTION, SOLUTION EPIDURAL; INFILTRATION; INTRACAUDAL; PERINEURAL at 10:32

## 2023-05-19 RX ADMIN — PROPOFOL 50 MG: 10 INJECTION, EMULSION INTRAVENOUS at 10:33

## 2023-05-19 RX ADMIN — GLYCOPYRROLATE 0.2 MG: 0.2 INJECTION INTRAMUSCULAR; INTRAVENOUS at 10:23

## 2023-05-19 RX ADMIN — PROPOFOL 100 MG: 10 INJECTION, EMULSION INTRAVENOUS at 10:32

## 2023-05-19 NOTE — ANESTHESIA POSTPROCEDURE EVALUATION
Post-Op Assessment Note    CV Status:  Stable  Pain Score: 0    Pain management: adequate     Mental Status:  Arousable   Hydration Status:  Stable   PONV Controlled:  None   Airway Patency:  Patent      Post Op Vitals Reviewed: Yes      Staff: CRNA         No notable events documented      BP   97/53   Temp     Pulse  80   Resp   17   SpO2   97%

## 2023-05-19 NOTE — ANESTHESIA PREPROCEDURE EVALUATION
Procedure:  EGD    Relevant Problems   Respiratory   (+) LPRD (laryngopharyngeal reflux disease)        Physical Exam    Airway    Mallampati score: II  TM Distance: >3 FB  Neck ROM: full     Dental   No notable dental hx     Cardiovascular      Pulmonary      Other Findings        Anesthesia Plan  ASA Score- 1     Anesthesia Type- IV sedation with anesthesia with ASA Monitors  Additional Monitors:   Airway Plan:           Plan Factors-Exercise tolerance (METS): >4 METS  Chart reviewed  Existing labs reviewed  Patient summary reviewed  Induction- intravenous  Postoperative Plan-     Informed Consent- Anesthetic plan and risks discussed with patient  I personally reviewed this patient with the CRNA  Discussed and agreed on the Anesthesia Plan with the CRNA  Yeison Lawson

## 2023-05-19 NOTE — H&P
History and Physical - [facility]  Moshe Field 27 y o  female MRN: [id number]                  HPI: Moshe Field is a 27y o  year old female who presents for EGD to investigate for Elam's, and find cause for nausea and laryngeal pharyngeal reflux  REVIEW OF SYSTEMS: Per the HPI, and otherwise unremarkable  Historical Information   Past Medical History:   Diagnosis Date   • Acid reflux     silent refulx   • Anorexia    • Bulimia    • Hemorrhoids    • MRSA infection 2014    in R underarm     Past Surgical History:   Procedure Laterality Date   • NO PAST SURGERIES       Social History   Social History     Substance and Sexual Activity   Alcohol Use Yes   • Alcohol/week: 5 0 standard drinks   • Types: 5 Glasses of wine per week    Comment: cant drink because the reflux  owns a kitty so a few wine a few times a week  Social History     Substance and Sexual Activity   Drug Use No     Social History     Tobacco Use   Smoking Status Never   Smokeless Tobacco Never     Family History   Problem Relation Age of Onset   • Deep vein thrombosis Mother    • Gallbladder disease Mother    • Hypertension Father    • Coronary artery disease Father    • Scoliosis Sister    • ADD / ADHD Sister    • Gallbladder disease Sister    • Uterine cancer Maternal Grandmother    • Breast cancer Other 61   • Colon cancer Neg Hx    • Ovarian cancer Neg Hx    • Cervical cancer Neg Hx        Meds/Allergies     (Not in a hospital admission)      Allergies   Allergen Reactions   • Sulfa Antibiotics Lip Swelling       Objective     Blood pressure 118/68, pulse 81, temperature (!) 97 1 °F (36 2 °C), temperature source Temporal, resp  rate 18, SpO2 94 %, not currently breastfeeding        PHYSICAL EXAM    Gen: NAD  CV: RRR  CHEST: Clear  ABD: soft, NT/ND  EXT: no edema      ASSESSMENT/PLAN:  Moshe Field is a 27y o  year old female who presents for EGD to investigate for Elam's, and find cause for nausea and laryngeal pharyngeal reflux  The patient is stable and optimized for the procedure, we reviewed risk and benefits  Risk include but not limited to infection, bleeding, perforation and missing a lesion

## 2023-07-17 ENCOUNTER — APPOINTMENT (OUTPATIENT)
Dept: RADIOLOGY | Facility: OTHER | Age: 33
End: 2023-07-17
Payer: COMMERCIAL

## 2023-07-17 ENCOUNTER — OFFICE VISIT (OUTPATIENT)
Dept: OBGYN CLINIC | Facility: OTHER | Age: 33
End: 2023-07-17
Payer: COMMERCIAL

## 2023-07-17 VITALS
HEART RATE: 84 BPM | DIASTOLIC BLOOD PRESSURE: 82 MMHG | WEIGHT: 179 LBS | BODY MASS INDEX: 27.13 KG/M2 | HEIGHT: 68 IN | SYSTOLIC BLOOD PRESSURE: 123 MMHG

## 2023-07-17 DIAGNOSIS — M75.42 SUBACROMIAL IMPINGEMENT OF LEFT SHOULDER: Primary | ICD-10-CM

## 2023-07-17 DIAGNOSIS — M25.512 LEFT SHOULDER PAIN, UNSPECIFIED CHRONICITY: ICD-10-CM

## 2023-07-17 PROCEDURE — 99204 OFFICE O/P NEW MOD 45 MIN: CPT | Performed by: ORTHOPAEDIC SURGERY

## 2023-07-17 PROCEDURE — 73030 X-RAY EXAM OF SHOULDER: CPT

## 2023-07-17 PROCEDURE — 20610 DRAIN/INJ JOINT/BURSA W/O US: CPT | Performed by: ORTHOPAEDIC SURGERY

## 2023-07-17 RX ORDER — BETAMETHASONE SODIUM PHOSPHATE AND BETAMETHASONE ACETATE 3; 3 MG/ML; MG/ML
6 INJECTION, SUSPENSION INTRA-ARTICULAR; INTRALESIONAL; INTRAMUSCULAR; SOFT TISSUE
Status: COMPLETED | OUTPATIENT
Start: 2023-07-17 | End: 2023-07-17

## 2023-07-17 RX ORDER — BUPIVACAINE HYDROCHLORIDE 2.5 MG/ML
2 INJECTION, SOLUTION INFILTRATION; PERINEURAL
Status: COMPLETED | OUTPATIENT
Start: 2023-07-17 | End: 2023-07-17

## 2023-07-17 RX ADMIN — BETAMETHASONE SODIUM PHOSPHATE AND BETAMETHASONE ACETATE 6 MG: 3; 3 INJECTION, SUSPENSION INTRA-ARTICULAR; INTRALESIONAL; INTRAMUSCULAR; SOFT TISSUE at 14:45

## 2023-07-17 RX ADMIN — BUPIVACAINE HYDROCHLORIDE 2 ML: 2.5 INJECTION, SOLUTION INFILTRATION; PERINEURAL at 14:45

## 2023-07-17 NOTE — PROGRESS NOTES
Assessment  Diagnoses and all orders for this visit:    Subacromial impingement of left shoulder    Discussion and Plan:    · The patient has an examination consistent with subacromial impingement syndrome of the left shoulder. I have discussed with the patient the pathophysiology of this diagnosis and reviewed how the examination correlates with this diagnosis. Treatment options were discussed at length and after discussing these treatment options, the patient elected for and received a subacromial injection of corticosteroid. I did ask her a few minutes after the injection she was still present in the office and she did feel some significant improvement already from the local anesthetic so hopefully she has a long-term benefit from this injection  · She was provided with a prescription for referral to physical therapy. · We will reevaluate the patient in 6-8 weeks. If the symptoms fail to improve with this treatment the patient would be indicated for further imaging in the form of an MRI scan of the shoulder. Subjective:   Patient ID: Ana Rosa Baires is a 26 y.o. female    The patient presents with a chief complaint of left shoulder pain. The pain began 2 day(s) ago and is not associated with an acute injury. The patient describes the pain as aching, dull and sharp in intensity,  constant, awakening patient from sleep in timing, and localizes the pain to the  left subacromial joint, deltoid. The pain is worse with overhead work, overuse and raising arm over head and relieved by rest, ice, avoiding the painful activities. The pain is not associated with numbness and tingling. The pain is not associated with constitutional symptoms. The patient is awoken at night by the pain. The patient has had no prior treatment.         The following portions of the patient's history were reviewed and updated as appropriate: allergies, current medications, past family history, past medical history, past social history, past surgical history and problem list.    Objective:  /82 (BP Location: Right arm, Patient Position: Sitting, Cuff Size: Adult)   Pulse 84   Ht 5' 8" (1.727 m) Comment: verbal  Wt 81.2 kg (179 lb)   BMI 27.22 kg/m²       Left Shoulder Exam     Range of Motion   External rotation: normal   Forward flexion: 150   Internal rotation 0 degrees: Lumbar     Muscle Strength   Abduction: 5/5   External rotation: 5/5     Tests   Mckeon test: positive  Impingement: positive  Drop arm: negative    Other   Erythema: absent  Sensation: normal  Pulse: present               Physical Exam  Constitutional:       Appearance: She is well-developed. Eyes:      Pupils: Pupils are equal, round, and reactive to light. Pulmonary:      Effort: Pulmonary effort is normal.      Breath sounds: Normal breath sounds. Skin:     General: Skin is warm and dry. Neurological:      Mental Status: She is alert and oriented to person, place, and time. Psychiatric:         Behavior: Behavior normal.         Thought Content: Thought content normal.         Judgment: Judgment normal.       Large joint arthrocentesis: L subacromial bursa  Universal Protocol:  Consent: Verbal consent obtained. Risks and benefits: risks, benefits and alternatives were discussed  Consent given by: patient  Time out: Immediately prior to procedure a "time out" was called to verify the correct patient, procedure, equipment, support staff and site/side marked as required. Site marked: the operative site was marked  Radiology Images displayed and confirmed.  If images not available, report reviewed: imaging studies available  Patient identity confirmed: verbally with patient    Supporting Documentation  Indications: pain and diagnostic evaluation   Procedure Details  Location: shoulder - L subacromial bursa  Preparation: Patient was prepped and draped in the usual sterile fashion  Needle size: 22 G  Ultrasound guidance: no  Approach: lateral  Medications administered: 2 mL bupivacaine 0.25 %; 6 mg betamethasone acetate-betamethasone sodium phosphate 6 (3-3) mg/mL    Patient tolerance: patient tolerated the procedure well with no immediate complications  Dressing:  Sterile dressing applied        I have personally reviewed pertinent films in PACS and my interpretation is as follows.     X Ray Left Shoulder 7/17/23: No acute osseous abnormalities or degenerative changes    Scribe Attestation    I,:  Jerome Shaver am acting as a scribe while in the presence of the attending physician.:       I,:  Daniele Puente MD personally performed the services described in this documentation    as scribed in my presence.:

## 2023-07-17 NOTE — PATIENT INSTRUCTIONS
CORTICOSTEROID INJECTION  What is a corticosteroid? Injuries or disease such as arthritis, bursitis or tendonitis result in inflammation. In turn, this inflammation can cause swelling and pain. A local injection of a corticosteroid is provided to diminish inflammation. By doing so, it will also decrease pain and swelling which is making you uncomfortable. Is this the same thing as a Cortisone Injection? Cortisone® is a brand name of a corticosteroid used commonly in the past.  Today I commonly use a more water-soluble corticosteroid named Celestone®. Will the injection hurt? As with any injection, you may feel pain at the time of the injection. Typically, I use a local anesthetic (Neena Demark) in addition to the corticosteroid to determine if the injection has been placed in the appropriate location. Hence it is important to monitor your symptoms 4-6 hours after the injection, as the area will be anesthetized (numb) while the local anesthetic is working. Once the local anesthetic wears off, the intensity of pain can be the same as it was prior to the injection, or even worse. This does not mean that the injection is not working. The corticosteroid may take 24-72 hours to begin having a positive effect. If you do experience an increase in pain, the use of an ice pack on the area for 20 minutes at a time should help. It is also helpful to take an oral anti-inflammatory such as Tylenol® or Motrin® if you are able to medically do so. For this reason it is best to avoid activities that put stress on the area the first 24 hours after the injection. How long will pain relief last?  This will vary according to the type and severity of the symptoms being treated and the severity of the condition. Symptom relief may last weeks to months. I typically couple injections with physical therapy so that the underlying problem causing the inflammation may be treated as the pain diminishes.   If the combination is not successful, you may be a surgical candidate. I have read bad things about steroids. Will these things happen to me? Corticosteroids, when utilized properly, are safe and effective drugs. When used in a low dose, potential adverse reactions are very rare. Some patients may experience a sensation of flushing for several days. Very rarely, there can be a local reaction which may include increased discomfort for a period of time in the areas that has been injected. A steroid should not be used over and over again. Multiple injections in the same area can produce adverse effects such as tissue atrophy and degeneration of tendon or cartilage. A small percentage of patients (< 0.1%) may develop an infection in the joint after injection. This is a treatable problem, but if neglected, may result in permanent disability. Signs of infection include redness, swelling, discharge, fevers, increasing pain and drainage from the injection site. This represents an emergency and you should contact our office immediately or seek treatment in the ER if after hours. If I have diabetes, will this injection affect me? If you are diabetic, an injection of a corticosteroid can raise your blood sugar level, requiring more insulin for a brief period of time. This may necessitate careful blood sugar maintenance. If the elevated sugars are not able to be controlled, contact your diabetic doctor for guidance.

## 2023-07-20 ENCOUNTER — EVALUATION (OUTPATIENT)
Dept: PHYSICAL THERAPY | Facility: CLINIC | Age: 33
End: 2023-07-20
Payer: COMMERCIAL

## 2023-07-20 VITALS — DIASTOLIC BLOOD PRESSURE: 64 MMHG | SYSTOLIC BLOOD PRESSURE: 102 MMHG | HEART RATE: 82 BPM

## 2023-07-20 DIAGNOSIS — M25.512 ACUTE PAIN OF LEFT SHOULDER: Primary | ICD-10-CM

## 2023-07-20 DIAGNOSIS — M75.42 SUBACROMIAL IMPINGEMENT OF LEFT SHOULDER: ICD-10-CM

## 2023-07-20 PROCEDURE — 97162 PT EVAL MOD COMPLEX 30 MIN: CPT | Performed by: PHYSICAL THERAPIST

## 2023-07-20 PROCEDURE — 97110 THERAPEUTIC EXERCISES: CPT | Performed by: PHYSICAL THERAPIST

## 2023-07-20 NOTE — PROGRESS NOTES
PT Evaluation     Today's date: 2023  Patient name: Mayo Sampson  : 1990  MRN: [id number]  Referring provider: Jazmyne Mesa  Dx:   Encounter Diagnosis     ICD-10-CM    1. Acute pain of left shoulder  M25.512       2. Subacromial impingement of left shoulder  M75.42 Ambulatory Referral to Physical Therapy                     Assessment  Assessment details: Mayo Sampson is a 26 y.o. female who presents with complaints of acute R shoulder pain. No further referral appears necessary at this time based upon examination results. Clinical presentation is consistent with MD referring diagnosis and current listed impairments are  limiting her ability to perform the aforementioned functional activities. Etiologic factors include repetitive poor body mechanics. Prognosis is good given HEP compliance and PT 2x/wk. Please contact me if you have any questions or recommendations. Thank you for the opportunity to share in  [facility] care.      Impairments: abnormal or restricted ROM, abnormal movement, activity intolerance, impaired physical strength, lacks appropriate home exercise program, pain with function, scapular dyskinesis and poor posture   Understanding of Dx/Px/POC: good   Prognosis: good    Goals  STGs  1) In 4 weeks patient will report 3 points reduced pain  2) in 4 weeks patient will demonstrate 1/2 grade improvement in L UE strength  3) In 4 weeks patient will demonstrate 10 deg improved shoulder abduction PROM    LTGs  1) In 6-8 weeks patient will report little to no difficulty with all ADLs and IADLs  2) In 6-8 weeks patient will score 10 points higher on FOTO outcomes measure, indicating significant functional improvement  3) in 6-8 weeks patient will demonstrate independence with HEP    Plan  Patient would benefit from: skilled physical therapy  Referral necessary: No  Planned modality interventions: cryotherapy and TENS  Planned therapy interventions: joint mobilization, IASTM, kinesiology taping, manual therapy, massage, Ramirez taping, neuromuscular re-education, patient education, postural training, self care, strengthening, stretching, therapeutic activities, therapeutic exercise, functional ROM exercises, flexibility and home exercise program  Frequency: 2x week  Duration in weeks: 8  Plan of Care beginning date: 2023  Plan of Care expiration date: 2023  Treatment plan discussed with: patient        Subjective Evaluation    History of Present Illness  Mechanism of injury: -c/o L shoulder pain  -acute pain started 4 days ago for no known reason, was making a smoothie and suddenly experienced sharp pain  -was having severe 7-8/10 pain intermittently  -saw Dr. Latrice Cota on  and had injection which has significantly helped with pain  -was feeling better and started using her shoulder again, so she is starting to hurt again  -she is noticing some scapular pain as well, new since the injection  -pain can radiate from her shoulder down her upper arm and toward her elbow, but not past her elbow  -occasional tingling sensation down her arm  -patient is R handed  -denies h/o injury to shoulder  -x-ray with no abnormal findings  -MD will order MRI if no better by next f/u with 6-8 weeks  -pain is aggravated by reaching behind her head, reaching behind her back, weight bearing through the arm and externally rotating the arm  -pain is improved with ice and rest  -denies popping or clicking  Patient Goals  Patient goals for therapy: decreased pain  Patient goal: exercises for home  Pain  Current pain ratin  At best pain ratin  At worst pain ratin  Quality: sharp    Social Support    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)  Hand dominance: right      Diagnostic Tests  X-ray: normal  Treatments  Previous treatment: injection treatment        Objective     Cervical/Thoracic Screen   Cervical range of motion within normal limits    Active Range of Motion   Left Shoulder   Flexion: 165 degrees   Extension: 72 degrees   Abduction: 150 degrees   External rotation BTH: T3   Internal rotation BTB: T6     Passive Range of Motion   Left Shoulder   Abduction: 120 degrees with pain    Strength/Myotome Testing     Left Shoulder     Planes of Motion   Flexion: 3   Abduction: 3   External rotation at 0°: 4+   Internal rotation at 0°: 4+     Isolated Muscles   Lower trapezius: 3+   Middle trapezius: 3+     Tests   Cervical   Negative vertical compression. Left   Negative Spurling's Test A. Right   Negative Spurling's Test A. Left Shoulder   Positive empty can, Hawkin's, Neer's, painful arc and passive horizontal adduction. Negative belly press, clunk, crank, drop arm, Speed's and relocation. Lumbar   Negative vertical compression. Precautions: none  POC exp 9/14/23  HEP Access Code: Leigha Brown- provided at  7/20/23  Manuals 7/20       L shoulder stretch (focus on abduction w/scap stabilized)                                Neuro Re-Ed        scap retraction 5" x20       Seated chin tucks        Supine scap punches        Supine ABCs                                Ther Ex        UBE for strengthing        TB rows        TB pull downs        TB IR        TB ER        Standing shoulder scaption to 90 deg        Standing shoulder flexion to 90 deg        Thoracic extensions Over chair  5" x10       UT stretch L 15"x4       Levator scap stretch        Ther Activity                                                Modalities                            Access Code: [email or web address]  URL: [email or web address]  Date: 07/20/2023  Prepared by: Jane Melendrez    Exercises  - Seated Scapular Retraction  - 2 x daily - 7 x weekly - 20 reps - 5 sec hold  - Seated Cervical Sidebending Stretch  - 2 x daily - 7 x weekly - 3 sets - 4 reps - 15 sec hold  - Seated Thoracic Lumbar Extension  - 2 x daily - 7 x weekly - 10 reps - 10 sec hold

## 2023-07-24 ENCOUNTER — OFFICE VISIT (OUTPATIENT)
Dept: PHYSICAL THERAPY | Facility: CLINIC | Age: 33
End: 2023-07-24
Payer: COMMERCIAL

## 2023-07-24 DIAGNOSIS — M25.512 ACUTE PAIN OF LEFT SHOULDER: ICD-10-CM

## 2023-07-24 DIAGNOSIS — M75.42 SUBACROMIAL IMPINGEMENT OF LEFT SHOULDER: Primary | ICD-10-CM

## 2023-07-24 PROCEDURE — 97110 THERAPEUTIC EXERCISES: CPT | Performed by: PHYSICAL THERAPIST

## 2023-07-24 PROCEDURE — 97112 NEUROMUSCULAR REEDUCATION: CPT | Performed by: PHYSICAL THERAPIST

## 2023-07-24 PROCEDURE — 97140 MANUAL THERAPY 1/> REGIONS: CPT | Performed by: PHYSICAL THERAPIST

## 2023-07-24 NOTE — PROGRESS NOTES
Daily Note     Today's date: 2023  Patient name: José Manuel Villaseñor  : 1990  MRN: [id number]  Referring provider: Afsaneh Roca  Dx:   Encounter Diagnosis     ICD-10-CM    1. Subacromial impingement of left shoulder  M75.42       2. Acute pain of left shoulder  M25.512                      Subjective: Patient reports she has been doing her home exercises without any issues. She is still getting pain in the L shoulder. Objective: See treatment diary below      Assessment: Tolerated treatment well. Expressed mild pain during TB pull downs. Reported a "good stretch" during manuals. Patient demonstrated fatigue post treatment, exhibited good technique with therapeutic exercises and would benefit from continued PT      Plan: Continue per plan of care. Progress treatment as tolerated.        Precautions: none  POC exp 23  HEP Access Code: Allison Mas- provided at IE 23  Manuals       L shoulder stretch (focus on abduction w/scap stabilized)                                Neuro Re-Ed        scap retraction 5" x20       Seated chin tucks        Supine scap punches  1# 5" x20      Supine ABCs  1# x2      Prone ITY  Single arm  3" x10 ea                      Ther Ex        UBE for strengthing  120 rpm  3'fwd/3'retro      TB rows  Red TB  5" x20      TB pull downs  Red TB  5" x20      TB IR  Red TB x20      TB ER  Red TB x20      Standing shoulder scaption to 90 deg  1# 2x10      Standing shoulder flexion to 90 deg  1# 2x10      Thoracic extensions Over chair  5" x10 Over chair  10" x10      UT stretch L 15"x4 L 15"x4      Levator scap stretch  L 15" x4      Ther Activity                                                Modalities

## 2023-07-28 ENCOUNTER — OFFICE VISIT (OUTPATIENT)
Dept: PHYSICAL THERAPY | Facility: CLINIC | Age: 33
End: 2023-07-28
Payer: COMMERCIAL

## 2023-07-28 DIAGNOSIS — M75.42 SUBACROMIAL IMPINGEMENT OF LEFT SHOULDER: Primary | ICD-10-CM

## 2023-07-28 DIAGNOSIS — M25.512 ACUTE PAIN OF LEFT SHOULDER: ICD-10-CM

## 2023-07-28 PROCEDURE — 97112 NEUROMUSCULAR REEDUCATION: CPT | Performed by: PHYSICAL THERAPIST

## 2023-07-28 PROCEDURE — 97110 THERAPEUTIC EXERCISES: CPT | Performed by: PHYSICAL THERAPIST

## 2023-07-28 PROCEDURE — 97140 MANUAL THERAPY 1/> REGIONS: CPT | Performed by: PHYSICAL THERAPIST

## 2023-07-28 NOTE — PROGRESS NOTES
Daily Note     Today's date: 2023  Patient name: Wandy Wellington  : 1990  MRN: [id number]  Referring provider: Karin Damon  Dx:   Encounter Diagnosis     ICD-10-CM    1. Subacromial impingement of left shoulder  M75.42       2. Acute pain of left shoulder  M25.512                      Subjective: Shoulder is feeling alright. Only hurts sometimes. She is doing her stretches and thinks the PT is starting to help. Her R shoulder is starting to bother her a little bit lately. Objective: See treatment diary below      Assessment: Progressed resistance for several exercises as charted. Tolerated treatment well. Patient demonstrated fatigue post treatment, exhibited good technique with therapeutic exercises and would benefit from continued PT      Plan: Continue per plan of care. Progress treatment as tolerated.        Precautions: none  POC exp 23  HEP Access Code: Jovon Cross- provided at IE 23  Manuals      L shoulder stretch (focus on abduction w/scap stabilized)   KG                             Neuro Re-Ed        scap retraction 5" x20       Seated chin tucks        Supine scap punches  1# 5" x20 2# 5" x20     Supine ABCs  1# x2 2# x2     Prone ITY  Single arm  3" x10 ea Single arm  3" 2x10 ea                     Ther Ex        UBE for strengthing  120 rpm  3'fwd/3'retro 120 rpm  4'fwd/4'retro     TB rows  Red TB  5" x20 Green TB  5" x20     TB pull downs  Red TB  5" x20 Green TB  5" x20     TB IR  Red TB x20 Green TB  x20     TB ER  Red TB x20 Green TB  x20      Standing shoulder scaption to 90 deg  1# 2x10 2# 2x10     Standing shoulder flexion to 90 deg  1# 2x10 2# 2x10     Thoracic extensions Over chair  5" x10 Over chair  10" x10 Over chair  5" x20     UT stretch L 15"x4 L 15"x4 L 15" x4     Levator scap stretch  L 15" x4 L 15" x4     Ther Activity                                                Modalities

## 2023-07-31 ENCOUNTER — APPOINTMENT (OUTPATIENT)
Dept: PHYSICAL THERAPY | Facility: CLINIC | Age: 33
End: 2023-07-31
Payer: COMMERCIAL

## 2023-08-01 ENCOUNTER — OFFICE VISIT (OUTPATIENT)
Dept: PHYSICAL THERAPY | Facility: CLINIC | Age: 33
End: 2023-08-01
Payer: COMMERCIAL

## 2023-08-01 DIAGNOSIS — M75.42 SUBACROMIAL IMPINGEMENT OF LEFT SHOULDER: Primary | ICD-10-CM

## 2023-08-01 DIAGNOSIS — M25.512 ACUTE PAIN OF LEFT SHOULDER: ICD-10-CM

## 2023-08-01 PROCEDURE — 97140 MANUAL THERAPY 1/> REGIONS: CPT

## 2023-08-01 PROCEDURE — 97110 THERAPEUTIC EXERCISES: CPT

## 2023-08-01 PROCEDURE — 97112 NEUROMUSCULAR REEDUCATION: CPT

## 2023-08-01 NOTE — PROGRESS NOTES
Daily Note     Today's date: 2023  Patient name: Rose Mary Harris  : 1990  MRN: [id number]  Referring provider: Gustavo Girard  Dx:   Encounter Diagnosis     ICD-10-CM    1. Subacromial impingement of left shoulder  M75.42       2. Acute pain of left shoulder  M25.512           Start Time: 0808  Stop Time: 09  Total time in clinic (min): 54 minutes    Subjective: Pt reports yesterday while working in her garden, she pointed to something to show her fiancee, and felt increased pain in L shldr.  Pain is better today after rest and OTC pain medication, but symptoms still linger       Objective: See treatment diary below      Assessment: Tolerated treatment well. Program modified slightly d/t aggravated symptoms reported at start of treatment. Quite of bit of tension in L cervical musculature with moderate tone along L UT/LS. Increased pain during prone I; tolerating T's better. Was able to complete all other exercise without any significant increase in symptoms. Most fatigued by TB resisted ER. Patient would benefit from continued PT to further improve scapular strength/stability, reduce pain, and maximize overall function. Plan: Continue per plan of care.       Precautions: none  POC exp 23  HEP Access Code: Angelic Stallings- provided at IE 23  Manuals  8/    L shoulder stretch (focus on abduction w/scap stabilized)   KG AFB    L UT/LS STM    AFB                    Neuro Re-Ed        scap retraction 5" x20       Seated chin tucks        Supine scap punches  1# 5" x20 2# 5" x20 2# 5" x20    Supine ABCs  1# x2 2# x2     Prone ITY  Single arm  3" x10 ea Single arm  3" 2x10 ea Single arm  I's 3"x10  T's 3"x20                    Ther Ex        UBE for strengthing  120 rpm  3'fwd/3'retro 120 rpm  4'fwd/4'retro 120 rpm  4'fwd/4'retro    TB rows  Red TB  5" x20 Green TB  5" x20 Green TB  5" x20    TB pull downs  Red TB  5" x20 Green TB  5" x20 Green TB  5" x20    TB IR Red TB x20 Green TB  x20 Green TB  5" x20    TB ER  Red TB x20 Green TB  x20  Green TB  5" x20    Standing shoulder scaption to 90 deg  1# 2x10 2# 2x10 2# 2x10    Standing shoulder flexion to 90 deg  1# 2x10 2# 2x10 2# 2x10    Thoracic extensions Over chair  5" x10 Over chair  10" x10 Over chair  5" x20 Over chair  5" x20    UT stretch L 15"x4 L 15"x4 L 15" x4 L 15" x4    Levator scap stretch  L 15" x4 L 15" x4 L 15" x4    Ther Activity                                                Modalities

## 2023-08-04 ENCOUNTER — OFFICE VISIT (OUTPATIENT)
Dept: PHYSICAL THERAPY | Facility: CLINIC | Age: 33
End: 2023-08-04
Payer: COMMERCIAL

## 2023-08-04 DIAGNOSIS — M75.42 SUBACROMIAL IMPINGEMENT OF LEFT SHOULDER: Primary | ICD-10-CM

## 2023-08-04 DIAGNOSIS — M25.512 ACUTE PAIN OF LEFT SHOULDER: ICD-10-CM

## 2023-08-04 PROCEDURE — 97110 THERAPEUTIC EXERCISES: CPT

## 2023-08-04 PROCEDURE — 97112 NEUROMUSCULAR REEDUCATION: CPT

## 2023-08-04 PROCEDURE — 97140 MANUAL THERAPY 1/> REGIONS: CPT

## 2023-08-04 NOTE — PROGRESS NOTES
Daily Note     Today's date: 2023  Patient name: Rayshawn Granados  : 1990  MRN: [id number]  Referring provider: Nereyda Gil  Dx:   Encounter Diagnosis     ICD-10-CM    1. Subacromial impingement of left shoulder  M75.42       2. Acute pain of left shoulder  M25.512                      Subjective: Pt reported that her shoulder has started "clicking." This is a new symptoms. The clicking does not cause pain. Objective: See treatment diary below      Assessment: Increased multiple exercises in reps to facilitate strength ans endurance of the L shoulder. Tolerated treatment well. Patient demonstrated fatigue post treatment, exhibited good technique with therapeutic exercises and would benefit from continued PT      Plan: Continue per plan of care. Progress treatment as tolerated.        Precautions: none  POC exp 23  HEP Access Code: Humza Castaneda- provided at IE 23  Manuals    L shoulder stretch (focus on abduction w/scap stabilized)   KG AFB TM   L UT/LS STM    AFB                    Neuro Re-Ed        scap retraction 5" x20       Seated chin tucks        Supine scap punches  1# 5" x20 2# 5" x20 2# 5" x20 2# 5" x20   Supine ABCs  1# x2 2# x2  2# x2   Prone ITY  Single arm  3" x10 ea Single arm  3" 2x10 ea Single arm  I's 3"x10  T's 3"x20 Single arm  I's 3"x10  T's 3"x20                   Ther Ex        UBE for strengthing  120 rpm  3'fwd/3'retro 120 rpm  4'fwd/4'retro 120 rpm  4'fwd/4'retro 120 rpm  5'fwd/5'retro   TB rows  Red TB  5" x20 Green TB  5" x20 Green TB  5" x20 Green TB  5" 2x15   TB pull downs  Red TB  5" x20 Green TB  5" x20 Green TB  5" x20 Green TB  5" 2x15   TB IR  Red TB x20 Green TB  x20 Green TB  5" x20 Green TB  x30   TB ER  Red TB x20 Green TB  x20  Green TB  5" x20 Green TB  x30   Standing shoulder scaption to 90 deg  1# 2x10 2# 2x10 2# 2x10 2# 2x10   Standing shoulder flexion to 90 deg  1# 2x10 2# 2x10 2# 2x10 2# 2x10   Thoracic extensions Over chair  5" x10 Over chair  10" x10 Over chair  5" x20 Over chair  5" x20 Over chair  5" x20   UT stretch L 15"x4 L 15"x4 L 15" x4 L 15" x4 B/L 15" x4   Levator scap stretch  L 15" x4 L 15" x4 L 15" x4 B/L 15" x4   Ther Activity                                                Modalities

## 2023-08-07 ENCOUNTER — OFFICE VISIT (OUTPATIENT)
Dept: PHYSICAL THERAPY | Facility: CLINIC | Age: 33
End: 2023-08-07
Payer: COMMERCIAL

## 2023-08-07 DIAGNOSIS — M75.42 SUBACROMIAL IMPINGEMENT OF LEFT SHOULDER: Primary | ICD-10-CM

## 2023-08-07 DIAGNOSIS — M25.512 ACUTE PAIN OF LEFT SHOULDER: ICD-10-CM

## 2023-08-07 PROCEDURE — 97140 MANUAL THERAPY 1/> REGIONS: CPT

## 2023-08-07 PROCEDURE — 97112 NEUROMUSCULAR REEDUCATION: CPT

## 2023-08-07 NOTE — PROGRESS NOTES
Daily Note     Today's date: 2023  Patient name: Mayo Sampson  : 1990  MRN: [id number]  Referring provider: Jazmyne Mesa  Dx:   Encounter Diagnosis     ICD-10-CM    1. Subacromial impingement of left shoulder  M75.42       2. Acute pain of left shoulder  M25.512                      Subjective: Pt feel that her injection is wearing off. She continues to c/o clicking and minor pain in the L shoulder. Objective: See treatment diary below      Assessment: Tolerated treatment well. Patient demonstrated fatigue post treatment, exhibited good technique with therapeutic exercises and would benefit from continued PT      Plan: Continue per plan of care. Progress treatment as tolerated.        Precautions: none  POC exp 23  HEP Access Code: [email or web address]- provided at IE 23  Manuals    L shoulder stretch (focus on abduction w/scap stabilized) TM  KG AFB TM   L UT/LS STM    AFB                    Neuro Re-Ed        scap retraction        Seated chin tucks        Supine scap punches 2# 5" x20 1# 5" x20 2# 5" x20 2# 5" x20 2# 5" x20   Supine ABCs 2# x2 1# x2 2# x2  2# x2   Prone ITY Single arm  I's 3" x20  T's 3" x20 Single arm  3" x10 ea Single arm  3" 2x10 ea Single arm  I's 3"x10  T's 3"x20 Single arm  I's 3"x10  T's 3"x20                   Ther Ex        UBE for strengthing 120 rpm  5'fwd/5'retro 120 rpm  3'fwd/3'retro 120 rpm  4'fwd/4'retro 120 rpm  4'fwd/4'retro 120 rpm  5'fwd/5'retro   TB rows Green TB  5" 2x15 Red TB  5" x20 Green TB  5" x20 Green TB  5" x20 Green TB  5" 2x15   TB pull downs Green TB  5" 2x15 Red TB  5" x20 Green TB  5" x20 Green TB  5" x20 Green TB  5" 2x15   TB IR Green TB  x30 Red TB x20 Green TB  x20 Green TB  5" x20 Green TB  x30   TB ER Green TB  x30 Red TB x20 Green TB  x20  Green TB  5" x20 Green TB  x30   Standing shoulder scaption to 90 deg 2# 2x10 1# 2x10 2# 2x10 2# 2x10 2# 2x10   Standing shoulder flexion to 90 deg 2# 2x10 1# 2x10 2# 2x10 2# 2x10 2# 2x10   Thoracic extensions Over chair  5" x20 Over chair  10" x10 Over chair  5" x20 Over chair  5" x20 Over chair  5" x20   UT stretch B/L 15" x4 L 15"x4 L 15" x4 L 15" x4 B/L 15" x4   Levator scap stretch B/L 15" x4 L 15" x4 L 15" x4 L 15" x4 B/L 15" x4   Ther Activity                                                Modalities

## 2023-08-11 ENCOUNTER — APPOINTMENT (OUTPATIENT)
Dept: PHYSICAL THERAPY | Facility: CLINIC | Age: 33
End: 2023-08-11
Payer: COMMERCIAL

## 2023-08-14 ENCOUNTER — OFFICE VISIT (OUTPATIENT)
Dept: PHYSICAL THERAPY | Facility: CLINIC | Age: 33
End: 2023-08-14
Payer: COMMERCIAL

## 2023-08-14 DIAGNOSIS — M25.512 ACUTE PAIN OF LEFT SHOULDER: ICD-10-CM

## 2023-08-14 DIAGNOSIS — M75.42 SUBACROMIAL IMPINGEMENT OF LEFT SHOULDER: Primary | ICD-10-CM

## 2023-08-14 PROCEDURE — 97110 THERAPEUTIC EXERCISES: CPT

## 2023-08-14 PROCEDURE — 97112 NEUROMUSCULAR REEDUCATION: CPT

## 2023-08-14 PROCEDURE — 97140 MANUAL THERAPY 1/> REGIONS: CPT

## 2023-08-14 NOTE — PROGRESS NOTES
Daily Note     Today's date: 2023  Patient name: Caroline London  : 1990  MRN: [id number]  Referring provider: Sayda Costa  Dx:   Encounter Diagnosis     ICD-10-CM    1. Subacromial impingement of left shoulder  M75.42       2. Acute pain of left shoulder  M25.512                      Subjective: Pt offered no new comments about her L shoulder symptoms. She does c/o minor R shoulder (uneffected arm) pain prior to PT. Objective: See treatment diary below      Assessment: Increased multiple exercises in resistance to facilitate strength. Tolerated treatment well. Patient demonstrated fatigue post treatment, exhibited good technique with therapeutic exercises and would benefit from continued PT      Plan: Continue per plan of care. Progress treatment as tolerated.        Precautions: none  POC exp 23  HEP Access Code: Leigha Stephanie- provided at IE 23  Manuals    L shoulder stretch (focus on abduction w/scap stabilized) TM TM KG AFB TM   L UT/LS STM    AFB                    Neuro Re-Ed        scap retraction        Seated chin tucks        Supine scap punches 2# 5" x20 3# 5" x20 2# 5" x20 2# 5" x20 2# 5" x20   Supine ABCs 2# x2 3# x2 2# x2  2# x2   Prone ITY Single arm  I's 3" x20  T's 3" x20 Single arm  I's 3" x20  T's 3" x20 Single arm  3" 2x10 ea Single arm  I's 3"x10  T's 3"x20 Single arm  I's 3"x10  T's 3"x20                   Ther Ex        UBE for strengthing 120 rpm  5'fwd/5'retro 120 rpm  5'fwd/5'retro 120 rpm  4'fwd/4'retro 120 rpm  4'fwd/4'retro 120 rpm  5'fwd/5'retro   TB rows Green TB  5" 2x15 Blue TB  5" 2x15 Green TB  5" x20 Green TB  5" x20 Green TB  5" 2x15   TB pull downs Green TB  5" 2x15 Blue TB  5" 2x15 Green TB  5" x20 Green TB  5" x20 Green TB  5" 2x15   TB IR Green TB  x30 Green TB x30 Green TB  x20 Green TB  5" x20 Green TB  x30   TB ER Green TB  x30 Green TB x30 Green TB  x20  Green TB  5" x20 Green TB  x30   Standing shoulder scaption to 90 deg 2# 2x10 3# 2x10 2# 2x10 2# 2x10 2# 2x10   Standing shoulder flexion to 90 deg 2# 2x10 3# 2x10 2# 2x10 2# 2x10 2# 2x10   Thoracic extensions Over chair  5" x20 Over chair  5" x20 Over chair  5" x20 Over chair  5" x20 Over chair  5" x20   UT stretch B/L 15" x4 B/L 15"x4 L 15" x4 L 15" x4 B/L 15" x4   Levator scap stretch B/L 15" x4 B/L 15"x4 L 15" x4 L 15" x4 B/L 15" x4   Ther Activity                                                Modalities

## 2023-08-17 ENCOUNTER — APPOINTMENT (OUTPATIENT)
Dept: PHYSICAL THERAPY | Facility: CLINIC | Age: 33
End: 2023-08-17
Payer: COMMERCIAL

## 2023-08-22 ENCOUNTER — APPOINTMENT (OUTPATIENT)
Dept: PHYSICAL THERAPY | Facility: CLINIC | Age: 33
End: 2023-08-22
Payer: COMMERCIAL

## 2023-08-23 ENCOUNTER — OFFICE VISIT (OUTPATIENT)
Dept: PHYSICAL THERAPY | Facility: CLINIC | Age: 33
End: 2023-08-23
Payer: COMMERCIAL

## 2023-08-23 DIAGNOSIS — M75.42 SUBACROMIAL IMPINGEMENT OF LEFT SHOULDER: Primary | ICD-10-CM

## 2023-08-23 DIAGNOSIS — M25.512 ACUTE PAIN OF LEFT SHOULDER: ICD-10-CM

## 2023-08-23 PROCEDURE — 97112 NEUROMUSCULAR REEDUCATION: CPT

## 2023-08-23 PROCEDURE — 97110 THERAPEUTIC EXERCISES: CPT

## 2023-08-23 NOTE — PROGRESS NOTES
Daily Note     Today's date: 2023  Patient name: Claude Gannon  : 1990  MRN: [id number]  Referring provider: Amna Chamberlain  Dx:   Encounter Diagnosis     ICD-10-CM    1. Subacromial impingement of left shoulder  M75.42       2. Acute pain of left shoulder  M25.512                      Subjective: Pt reports she has been compliant with exercise program.      Objective: See treatment diary below      Assessment: Tolerated treatment well. No significant limitations L shoulder PROM noted today. Patient demonstrated fatigue post treatment, exhibited good technique with therapeutic exercises and would benefit from continued PT      Plan: Continue per plan of care.       Precautions: none  POC exp 23  HEP Access Code: Alison Calhoun- provided at IE 23  Manuals    L shoulder stretch (focus on abduction w/scap stabilized) TM TM [employer] TM   L UT/LS STM    AFB                    Neuro Re-Ed        scap retraction        Seated chin tucks        Supine scap punches 2# 5" x20 3# 5" x20 2# 5" x20 2# 5" x20 2# 5" x20   Supine ABCs 2# x2 3# x2 2# x2  2# x2   Prone ITY Single arm  I's 3" x20  T's 3" x20 Single arm  I's 3" x20  T's 3" x20 Single arm  I's and T's  3" 2x10 ea Single arm  I's 3"x10  T's 3"x20 Single arm  I's 3"x10  T's 3"x20                   Ther Ex        UBE for strengthing 120 rpm  5'fwd/5'retro 120 rpm  5'fwd/5'retro 120 rpm  5'fwd/5'retro 120 rpm  4'fwd/4'retro 120 rpm  5'fwd/5'retro   TB rows Green TB  5" 2x15 Blue TB  5" 2x15 Blue TB  5" x20 Green TB  5" x20 Green TB  5" 2x15   TB pull downs Green TB  5" 2x15 Blue TB  5" 2x15 Blue TB  5" x20 Green TB  5" x20 Green TB  5" 2x15   TB IR Green TB  x30 Green TB x30 Green   TB  x20 Green TB  5" x20 Green TB  x30   TB ER Green TB  x30 Green TB x30 Green TB  x30  Green TB  5" x20 Green TB  x30   Standing shoulder scaption to 90 deg 2# 2x10 3# 2x10 2# 2x10 2# 2x10 2# 2x10   Standing shoulder flexion to 90 deg 2# 2x10 3# 2x10 2# 2x10 2# 2x10 2# 2x10   Thoracic extensions Over chair  5" x20 Over chair  5" x20 Over chair  5" x20 Over chair  5" x20 Over chair  5" x20   UT stretch B/L 15" x4 B/L 15"x4 L 15" x4 L 15" x4 B/L 15" x4   Levator scap stretch B/L 15" x4 B/L 15"x4 L 15" x4 L 15" x4 B/L 15" x4   Ther Activity                                                Modalities

## 2023-09-11 ENCOUNTER — OFFICE VISIT (OUTPATIENT)
Dept: OBGYN CLINIC | Facility: OTHER | Age: 33
End: 2023-09-11
Payer: COMMERCIAL

## 2023-09-11 VITALS
WEIGHT: 182.2 LBS | HEIGHT: 68 IN | DIASTOLIC BLOOD PRESSURE: 71 MMHG | SYSTOLIC BLOOD PRESSURE: 107 MMHG | BODY MASS INDEX: 27.61 KG/M2 | HEART RATE: 73 BPM

## 2023-09-11 DIAGNOSIS — M75.42 SUBACROMIAL IMPINGEMENT OF LEFT SHOULDER: Primary | ICD-10-CM

## 2023-09-11 PROCEDURE — 99213 OFFICE O/P EST LOW 20 MIN: CPT | Performed by: ORTHOPAEDIC SURGERY

## 2023-09-11 NOTE — PROGRESS NOTES
Assessment  Diagnoses and all orders for this visit:    Subacromial impingement of left shoulder - resolved        Discussion and Plan:    The patient has made significant improvement with nonoperative care of her left subacromial impingement and her examination today is benign. Her right shoulder did have some symptoms during her physical therapy which was effectively treated by therapy exercises and currently she is asymptomatic. We are happy to see her in the future if necessary. The patient does have a home exercise program that she will return to if her symptoms recur. Subjective:   Patient ID: Janie Dorsey is a 26 y.o. female      HPI    Patient returns follow-up of her left shoulder subacromial impingement syndrome. She has been treated with a subacromial injection and physical therapy and reports doing very well, most of her symptoms have completely resolved, she did have some twinges in her left shoulder but these have become much less common and she even developed some symptoms in her right shoulder which responded to the therapy she was participating in. The following portions of the patient's history were reviewed and updated as appropriate: allergies, current medications, past family history, past medical history, past social history, past surgical history and problem list.    Objective:  /71   Pulse 73   Ht 5' 8" (1.727 m)   Wt 82.6 kg (182 lb 3.2 oz)   BMI 27.70 kg/m²       Left Shoulder Exam     Tenderness   The patient is experiencing no tenderness. Range of Motion   The patient has normal left shoulder ROM.     Muscle Strength   Abduction: 5/5   Internal rotation: 5/5   External rotation: 5/5     Tests   Mckeon test: negative  Impingement: negative  Drop arm: negative    Other   Erythema: absent  Scars: absent  Sensation: normal  Pulse: present         I did personally review the notes in the electronic medical record system from her physical therapy which she [place], these notes reflect her recollection of improvement of symptoms with physical therapy

## 2023-09-21 ENCOUNTER — OFFICE VISIT (OUTPATIENT)
Dept: URGENT CARE | Facility: CLINIC | Age: 33
End: 2023-09-21
Payer: COMMERCIAL

## 2023-09-21 VITALS
RESPIRATION RATE: 20 BRPM | HEART RATE: 72 BPM | BODY MASS INDEX: 27.89 KG/M2 | OXYGEN SATURATION: 98 % | WEIGHT: 184 LBS | HEIGHT: 68 IN | DIASTOLIC BLOOD PRESSURE: 74 MMHG | TEMPERATURE: 98.1 F | SYSTOLIC BLOOD PRESSURE: 106 MMHG

## 2023-09-21 DIAGNOSIS — J06.9 UPPER RESPIRATORY INFECTION WITH COUGH AND CONGESTION: Primary | ICD-10-CM

## 2023-09-21 DIAGNOSIS — J02.8 ACUTE PHARYNGITIS DUE TO OTHER SPECIFIED ORGANISMS: ICD-10-CM

## 2023-09-21 LAB — S PYO AG THROAT QL: NEGATIVE

## 2023-09-21 PROCEDURE — 99213 OFFICE O/P EST LOW 20 MIN: CPT | Performed by: NURSE PRACTITIONER

## 2023-09-21 PROCEDURE — 87880 STREP A ASSAY W/OPTIC: CPT | Performed by: NURSE PRACTITIONER

## 2023-09-21 PROCEDURE — 87070 CULTURE OTHR SPECIMN AEROBIC: CPT | Performed by: NURSE PRACTITIONER

## 2023-09-21 NOTE — PATIENT INSTRUCTIONS
Your strep A is negative. You have a throat culture pending. You are to download [employer] for the results in 3-4 days. You will be notified if the results are + and an antibiotic will be called in for you. You are to do warm salt water gargles 4 x daily. Drink warm tea with honey and lemon. Take tylenol or motrin as able for pain or fever. Chloraseptic throat spray, cough drops. Do not share utensils. Change your tooth brush in 3 days. Follow up with your PCP in 2-3 days  Go to the ED if symptoms worsen      You are to test for covid for the next 3 days. If your covid is +. ...... You are to take vitamin C, D3,  plain robitussin for cough. Do not take cough suppressants; you want to take an expectorant. Sleep on your stomach. You are to quarantine as required per CDC guidelines of 5 days. Mask x 10 days if positive. Mask as long as you have symptoms. See your PCP for follow up in 2-3 days. Go to the ED if symptoms worsen or are severe.    You are to call your PCP if your results are + to discuss Paxlovid treatment

## 2023-09-21 NOTE — PROGRESS NOTES
[facility]        NAME: Pooja Miramontes is a 26 y.o. female  : 1990    MRN: [id number]  DATE: 2023  TIME: 11:19 AM    Assessment and Plan   Upper respiratory infection with cough and congestion [J06.9]  1. Upper respiratory infection with cough and congestion        2. Acute pharyngitis due to other specified organisms  POCT rapid strepA    Throat culture            Patient Instructions       Follow up with PCP in 3-5 days. Proceed to  ER if symptoms worsen. Your strep A is negative. You have a throat culture pending. You are to download  [employer] for the results in 3-4 days. You will be notified if the results are + and an antibiotic will be called in for you. You are to do warm salt water gargles 4 x daily. Drink warm tea with honey and lemon. Take tylenol or motrin as able for pain or fever. Chloraseptic throat spray, cough drops. Do not share utensils. Change your tooth brush in 3 days. Follow up with your PCP in 2-3 days  Go to the ED if symptoms worsen      You are to test for covid for the next 3 days. If your covid is +. ...... You are to take vitamin C, D3,  plain robitussin for cough. Do not take cough suppressants; you want to take an expectorant. Sleep on your stomach. You are to quarantine as required per CDC guidelines of 5 days. Mask x 10 days if positive. Mask as long as you have symptoms. See your PCP for follow up in 2-3 days. Go to the ED if symptoms worsen or are severe. You are to call your PCP if your results are + to discuss Paxlovid treatment        Chief Complaint     Chief Complaint   Patient presents with   • Sore Throat     Started last night          History of Present Illness       This is a 26year old female who states works at a [employer] and is exposed to the public who last night developed a scratchy throat and did WSW gargles, took honey and woke up this am with worsening sorethroat.   She states she is concerned she has covid or strep throat. She states she is covid vaccinated. She denies fevers, chills, n/v/d. She states that she didn't feel well a couple of days ago as well - she did not test for covid. Review of Systems   Review of Systems   Constitutional: Positive for fatigue. HENT: Positive for congestion and sore throat. Eyes: Negative. Respiratory: Positive for cough. Cardiovascular: Negative. Gastrointestinal: Negative. Endocrine: Negative. Genitourinary: Negative. Musculoskeletal: Negative. Skin: Negative. Allergic/Immunologic: Negative. Neurological: Negative. Hematological: Negative. Psychiatric/Behavioral: Negative.           Current Medications       Current Outpatient Medications:   •  Ashwagandha 125 MG CAPS, , Disp: , Rfl:   •  famotidine (PEPCID) 20 mg tablet, Take 1 tablet (20 mg total) by mouth daily, Disp: 30 tablet, Rfl: 0  •  omeprazole (PriLOSEC) 40 MG capsule, Take 1 capsule (40 mg total) by mouth daily (Patient not taking: Reported on 7/17/2023), Disp: 30 capsule, Rfl: 5  •  PARAGARD INTRAUTERINE COPPER IU, by Intrauterine route, Disp: , Rfl:     Current Allergies     Allergies as of 09/21/2023 - Reviewed 09/21/2023   Allergen Reaction Noted   • Sulfa antibiotics Lip Swelling 11/28/2018            The following portions of the patient's history were reviewed and updated as appropriate: allergies, current medications, past family history, past medical history, past social history, past surgical history and problem list.     Past Medical History:   Diagnosis Date   • Acid reflux     silent refulx   • Anorexia    • Bulimia    • Hemorrhoids    • MRSA infection 2014    in R underarm       Past Surgical History:   Procedure Laterality Date   • NO PAST SURGERIES         Family History   Problem Relation Age of Onset   • Deep vein thrombosis Mother    • Gallbladder disease Mother    • Hypertension Father    • Coronary artery disease Father    • Scoliosis Sister • ADD / ADHD Sister    • Gallbladder disease Sister    • Uterine cancer Maternal Grandmother    • Breast cancer Other 61   • Colon cancer Neg Hx    • Ovarian cancer Neg Hx    • Cervical cancer Neg Hx          Medications have been verified. Objective   /74   Pulse 72   Temp 98.1 °F (36.7 °C)   Resp 20   Ht 5' 8" (1.727 m)   Wt 83.5 kg (184 lb)   SpO2 98%   BMI 27.98 kg/m²   No LMP recorded. Patient has had an implant. Physical Exam     Physical Exam  Vitals and nursing note reviewed. Constitutional:       General: She is not in acute distress. Appearance: She is well-developed and normal weight. She is not ill-appearing, toxic-appearing or diaphoretic. HENT:      Head: Normocephalic and atraumatic. Right Ear: Tympanic membrane and ear canal normal.      Left Ear: Tympanic membrane and ear canal normal.      Nose: Congestion present. No rhinorrhea. Mouth/Throat:      Mouth: Mucous membranes are moist. No oral lesions. Pharynx: Uvula midline. Pharyngeal swelling and posterior oropharyngeal erythema present. No oropharyngeal exudate or uvula swelling. Tonsils: No tonsillar exudate or tonsillar abscesses. 0 on the right. 0 on the left. Eyes:      Extraocular Movements:      Right eye: Normal extraocular motion. Left eye: Normal extraocular motion. Cardiovascular:      Rate and Rhythm: Normal rate and regular rhythm. Heart sounds: Normal heart sounds. No murmur heard. Pulmonary:      Effort: Pulmonary effort is normal.      Breath sounds: Normal breath sounds. Musculoskeletal:      Cervical back: Normal range of motion and neck supple. Lymphadenopathy:      Cervical: No cervical adenopathy. Skin:     General: Skin is warm and dry. Capillary Refill: Capillary refill takes less than 2 seconds. Neurological:      General: No focal deficit present. Mental Status: She is alert and oriented to person, place, and time.    Psychiatric: Mood and Affect: Mood normal.         Behavior: Behavior normal.             Education provided to pt regarding POCT testing in less than 24 hours for strep or covid and possibility of false negatives. Told pt that throat culture will be sent if strep A POCT is negative. Explained that she will need to test at home for the next 3 days for accurate covid testing. Explained she may wear a mask at work. She may go to work if fever free.

## 2023-09-23 LAB — BACTERIA THROAT CULT: NORMAL

## 2023-12-16 ENCOUNTER — HOSPITAL ENCOUNTER (EMERGENCY)
Facility: HOSPITAL | Age: 33
Discharge: HOME | End: 2023-12-17
Attending: EMERGENCY MEDICINE
Payer: COMMERCIAL

## 2023-12-16 ENCOUNTER — APPOINTMENT (EMERGENCY)
Dept: CT IMAGING | Facility: HOSPITAL | Age: 33
End: 2023-12-16
Payer: COMMERCIAL

## 2023-12-16 VITALS
DIASTOLIC BLOOD PRESSURE: 65 MMHG | OXYGEN SATURATION: 98 % | TEMPERATURE: 97.8 F | RESPIRATION RATE: 18 BRPM | SYSTOLIC BLOOD PRESSURE: 112 MMHG | WEIGHT: 185 LBS | BODY MASS INDEX: 28.04 KG/M2 | HEIGHT: 68 IN | HEART RATE: 80 BPM

## 2023-12-16 DIAGNOSIS — R10.9 ABDOMINAL PAIN: Primary | ICD-10-CM

## 2023-12-16 DIAGNOSIS — N39.0 UTI (URINARY TRACT INFECTION): ICD-10-CM

## 2023-12-16 DIAGNOSIS — N83.201 CYST OF RIGHT OVARY: ICD-10-CM

## 2023-12-16 LAB
ALBUMIN SERPL BCP-MCNC: 4.3 G/DL (ref 3.5–5)
ALP SERPL-CCNC: 68 U/L (ref 34–104)
ALT SERPL W P-5'-P-CCNC: 7 U/L (ref 7–52)
ANION GAP SERPL CALCULATED.3IONS-SCNC: 6 MMOL/L
AST SERPL W P-5'-P-CCNC: 15 U/L (ref 13–39)
BACTERIA UR QL AUTO: ABNORMAL /HPF
BASOPHILS # BLD AUTO: 0.09 THOUSANDS/ÂΜL (ref 0–0.1)
BASOPHILS NFR BLD AUTO: 1 % (ref 0–1)
BILIRUB SERPL-MCNC: 0.59 MG/DL (ref 0.2–1)
BILIRUB UR QL STRIP: NEGATIVE
BUN SERPL-MCNC: 13 MG/DL (ref 5–25)
CALCIUM SERPL-MCNC: 9.2 MG/DL (ref 8.4–10.2)
CHLORIDE SERPL-SCNC: 104 MMOL/L (ref 96–108)
CLARITY UR: ABNORMAL
CO2 SERPL-SCNC: 28 MMOL/L (ref 21–32)
COLOR UR: YELLOW
CREAT SERPL-MCNC: 0.73 MG/DL (ref 0.6–1.3)
EOSINOPHIL # BLD AUTO: 0.21 THOUSAND/ÂΜL (ref 0–0.61)
EOSINOPHIL NFR BLD AUTO: 3 % (ref 0–6)
ERYTHROCYTE [DISTWIDTH] IN BLOOD BY AUTOMATED COUNT: 12.3 % (ref 11.6–15.1)
GFR SERPL CREATININE-BSD FRML MDRD: 107 ML/MIN/1.73SQ M
GLUCOSE SERPL-MCNC: 91 MG/DL (ref 65–140)
GLUCOSE UR STRIP-MCNC: NEGATIVE MG/DL
HCG SERPL QL: NEGATIVE
HCT VFR BLD AUTO: 41.7 % (ref 34.8–46.1)
HGB BLD-MCNC: 13.6 G/DL (ref 11.5–15.4)
HGB UR QL STRIP.AUTO: ABNORMAL
IMM GRANULOCYTES # BLD AUTO: 0.02 THOUSAND/UL (ref 0–0.2)
IMM GRANULOCYTES NFR BLD AUTO: 0 % (ref 0–2)
KETONES UR STRIP-MCNC: NEGATIVE MG/DL
LEUKOCYTE ESTERASE UR QL STRIP: ABNORMAL
LIPASE SERPL-CCNC: 33 U/L (ref 11–82)
LYMPHOCYTES # BLD AUTO: 3.13 THOUSANDS/ÂΜL (ref 0.6–4.47)
LYMPHOCYTES NFR BLD AUTO: 37 % (ref 14–44)
MCH RBC QN AUTO: 31.3 PG (ref 26.8–34.3)
MCHC RBC AUTO-ENTMCNC: 32.6 G/DL (ref 31.4–37.4)
MCV RBC AUTO: 96 FL (ref 82–98)
MONOCYTES # BLD AUTO: 0.79 THOUSAND/ÂΜL (ref 0.17–1.22)
MONOCYTES NFR BLD AUTO: 9 % (ref 4–12)
MUCOUS THREADS UR QL AUTO: ABNORMAL
NEUTROPHILS # BLD AUTO: 4.17 THOUSANDS/ÂΜL (ref 1.85–7.62)
NEUTS SEG NFR BLD AUTO: 50 % (ref 43–75)
NITRITE UR QL STRIP: NEGATIVE
NON-SQ EPI CELLS URNS QL MICRO: ABNORMAL /HPF
NRBC BLD AUTO-RTO: 0 /100 WBCS
PH UR STRIP.AUTO: 5.5 [PH]
PLATELET # BLD AUTO: 323 THOUSANDS/UL (ref 149–390)
PMV BLD AUTO: 9.5 FL (ref 8.9–12.7)
POTASSIUM SERPL-SCNC: 3.4 MMOL/L (ref 3.5–5.3)
PROT SERPL-MCNC: 7 G/DL (ref 6.4–8.4)
PROT UR STRIP-MCNC: NEGATIVE MG/DL
RBC # BLD AUTO: 4.34 MILLION/UL (ref 3.81–5.12)
RBC #/AREA URNS AUTO: ABNORMAL /HPF
SODIUM SERPL-SCNC: 138 MMOL/L (ref 135–147)
SP GR UR STRIP.AUTO: >=1.03
UROBILINOGEN UR QL STRIP.AUTO: 0.2 E.U./DL
WBC # BLD AUTO: 8.41 THOUSAND/UL (ref 4.31–10.16)
WBC #/AREA URNS AUTO: ABNORMAL /HPF

## 2023-12-16 PROCEDURE — 85025 COMPLETE CBC W/AUTO DIFF WBC: CPT | Performed by: EMERGENCY MEDICINE

## 2023-12-16 PROCEDURE — 87086 URINE CULTURE/COLONY COUNT: CPT | Performed by: EMERGENCY MEDICINE

## 2023-12-16 PROCEDURE — 83690 ASSAY OF LIPASE: CPT | Performed by: EMERGENCY MEDICINE

## 2023-12-16 PROCEDURE — 84703 CHORIONIC GONADOTROPIN ASSAY: CPT | Performed by: EMERGENCY MEDICINE

## 2023-12-16 PROCEDURE — G1004 CDSM NDSC: HCPCS

## 2023-12-16 PROCEDURE — 81003 URINALYSIS AUTO W/O SCOPE: CPT | Performed by: EMERGENCY MEDICINE

## 2023-12-16 PROCEDURE — 99284 EMERGENCY DEPT VISIT MOD MDM: CPT

## 2023-12-16 PROCEDURE — 99285 EMERGENCY DEPT VISIT HI MDM: CPT | Performed by: EMERGENCY MEDICINE

## 2023-12-16 PROCEDURE — 80053 COMPREHEN METABOLIC PANEL: CPT | Performed by: EMERGENCY MEDICINE

## 2023-12-16 PROCEDURE — 96374 THER/PROPH/DIAG INJ IV PUSH: CPT

## 2023-12-16 PROCEDURE — 81001 URINALYSIS AUTO W/SCOPE: CPT | Performed by: EMERGENCY MEDICINE

## 2023-12-16 PROCEDURE — 74177 CT ABD & PELVIS W/CONTRAST: CPT

## 2023-12-16 PROCEDURE — 36415 COLL VENOUS BLD VENIPUNCTURE: CPT | Performed by: EMERGENCY MEDICINE

## 2023-12-16 PROCEDURE — 96361 HYDRATE IV INFUSION ADD-ON: CPT

## 2023-12-16 RX ORDER — KETOROLAC TROMETHAMINE 30 MG/ML
30 INJECTION, SOLUTION INTRAMUSCULAR; INTRAVENOUS ONCE
Status: COMPLETED | OUTPATIENT
Start: 2023-12-16 | End: 2023-12-16

## 2023-12-16 RX ORDER — CEPHALEXIN 500 MG/1
500 CAPSULE ORAL ONCE
Status: DISCONTINUED | OUTPATIENT
Start: 2023-12-17 | End: 2023-12-16

## 2023-12-16 RX ADMIN — SODIUM CHLORIDE 1000 ML: 0.9 INJECTION, SOLUTION INTRAVENOUS at 21:57

## 2023-12-16 RX ADMIN — IOHEXOL 100 ML: 300 INJECTION, SOLUTION INTRAVENOUS at 22:58

## 2023-12-16 RX ADMIN — KETOROLAC TROMETHAMINE 30 MG: 30 INJECTION, SOLUTION INTRAMUSCULAR; INTRAVENOUS at 23:09

## 2023-12-17 PROCEDURE — 96361 HYDRATE IV INFUSION ADD-ON: CPT

## 2023-12-17 RX ORDER — CEPHALEXIN 500 MG/1
500 CAPSULE ORAL EVERY 12 HOURS SCHEDULED
Qty: 10 CAPSULE | Refills: 0 | Status: SHIPPED | OUTPATIENT
Start: 2023-12-17 | End: 2023-12-22

## 2023-12-17 RX ORDER — CEPHALEXIN 500 MG/1
500 CAPSULE ORAL ONCE
Status: COMPLETED | OUTPATIENT
Start: 2023-12-17 | End: 2023-12-17

## 2023-12-17 RX ADMIN — CEPHALEXIN 500 MG: 500 CAPSULE ORAL at 01:35

## 2023-12-17 NOTE — ED PROVIDER NOTES
History  Chief Complaint   Patient presents with    Abdominal Pain       33-YEAR-OLD FEMALE    PMH:  Chronic knee pain   Irregular periods  GERD    Chief complaint:  PATIENT IS HERE FOR RIGHT LOWER QUADRANT ABDOMINAL PAIN    HPI:  STATES THIS STARTED around noon  CAME ON GRADUALLY    PAIN HAS BEEN CONSTANT FOR THE LAST COUPLE OF HOURS.    PAIN IS NOW  RATED 6/10  DESCRIBED AS THROBBING      ASSOCIATED SYMPTOMS:  URINARY  SYMPTOMS: THERE IS NO DYSURIA, NO HEMATURIA, NO FREQUENCY  SHE denies NAUSEA, DENIES ANY VOMITING.    DENIES FEVERS, CHILLS    SLIGHT LOOSE STOOLS, NO DIARRHEA,   NO BLOODY STOOLS - NOT BLACK OR BLOODY      NO VAGINAL BLEEDING OR DISCHARGE      ALLEVIATING OR EXACERBATING FACTORS:  UNCERTAIN      INTERVENTIONS: NONE           History provided by:  Patient  Abdominal Pain  Pain location:  RLQ  Pain quality: pressure and sharp    Pain severity:  Moderate  Onset quality:  Gradual  Duration:  9 hours  Chronicity:  New  Relieved by:  Nothing  Worsened by:  Nothing  Ineffective treatments:  None tried  Associated symptoms: no chest pain, no chills, no constipation, no cough, no diarrhea, no dysuria, no fatigue, no fever, no hematemesis, no hematochezia, no hematuria, no melena, no nausea, no shortness of breath, no vaginal bleeding, no vaginal discharge and no vomiting        Prior to Admission Medications   Prescriptions Last Dose Informant Patient Reported? Taking?   Ashwagandha 125 MG CAPS   Yes No   PARAGARD INTRAUTERINE COPPER IU  Self Yes No   Sig: by Intrauterine route   famotidine (PEPCID) 20 mg tablet   No No   Sig: Take 1 tablet (20 mg total) by mouth daily   omeprazole (PriLOSEC) 40 MG capsule   No No   Sig: Take 1 capsule (40 mg total) by mouth daily   Patient not taking: Reported on 7/17/2023      Facility-Administered Medications: None       Past Medical History:   Diagnosis Date    Acid reflux     silent refulx    Anorexia     Bulimia     Hemorrhoids     MRSA infection 2014    in R underarm        Past Surgical History:   Procedure Laterality Date    NO PAST SURGERIES         Family History   Problem Relation Age of Onset    Deep vein thrombosis Mother     Gallbladder disease Mother     Hypertension Father     Coronary artery disease Father     Scoliosis Sister     ADD / ADHD Sister     Gallbladder disease Sister     Uterine cancer Maternal Grandmother     Breast cancer Other 60    Colon cancer Neg Hx     Ovarian cancer Neg Hx     Cervical cancer Neg Hx      I have reviewed and agree with the history as documented.    E-Cigarette/Vaping    E-Cigarette Use Never User      E-Cigarette/Vaping Substances    Nicotine No     THC No     CBD No     Flavoring No     Other No     Unknown No      Social History     Tobacco Use    Smoking status: Never    Smokeless tobacco: Never   Vaping Use    Vaping status: Never Used   Substance Use Topics    Alcohol use: Yes     Alcohol/week: 5.0 standard drinks of alcohol     Types: 5 Glasses of wine per week     Comment: cant drink because the reflux. 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     Drug use: No       Review of Systems   Constitutional:  Negative for chills, diaphoresis, fatigue and fever.   Respiratory:  Negative for cough, shortness of breath, wheezing and stridor.    Cardiovascular:  Negative for chest pain, palpitations and leg swelling.   Gastrointestinal:  Positive for abdominal pain. Negative for blood in stool, constipation, diarrhea, hematemesis, hematochezia, melena, nausea, rectal pain and vomiting.   Genitourinary:  Negative for difficulty urinating, dysuria, flank pain, frequency, hematuria, vaginal bleeding and vaginal discharge.   Musculoskeletal:  Negative for arthralgias, back pain, gait problem, joint swelling, myalgias, neck pain and neck stiffness.   Skin:  Negative for color change, rash and wound.   Neurological:  Negative for dizziness, light-headedness and headaches.   All other systems reviewed and are negative.      Physical  Exam  Physical Exam  Constitutional:       General: She is not in acute distress.     Appearance: She is well-developed. She is not ill-appearing, toxic-appearing or diaphoretic.   HENT:      Head: Normocephalic and atraumatic.      Nose: Nose normal.      Mouth/Throat:      Pharynx: No oropharyngeal exudate.   Eyes:      General: No scleral icterus.        Right eye: No discharge.         Left eye: No discharge.      Extraocular Movements: Extraocular movements intact.      Conjunctiva/sclera: Conjunctivae normal.      Pupils: Pupils are equal, round, and reactive to light.   Neck:      Vascular: No JVD.      Trachea: No tracheal deviation.   Cardiovascular:      Rate and Rhythm: Normal rate and regular rhythm.      Heart sounds: Normal heart sounds. No murmur heard.     No friction rub. No gallop.   Pulmonary:      Effort: Pulmonary effort is normal. No respiratory distress.      Breath sounds: Normal breath sounds. No stridor. No wheezing, rhonchi or rales.   Chest:      Chest wall: No tenderness.   Abdominal:      General: Bowel sounds are normal. There is no distension.      Palpations: Abdomen is soft. There is no mass.      Tenderness: There is abdominal tenderness. There is no right CVA tenderness, left CVA tenderness, guarding or rebound. Positive signs include McBurney's sign. Negative signs include Stoner's sign, Rovsing's sign, psoas sign and obturator sign.      Hernia: No hernia is present.   Musculoskeletal:         General: No tenderness or deformity. Normal range of motion.      Cervical back: Normal range of motion and neck supple.   Lymphadenopathy:      Cervical: No cervical adenopathy.   Skin:     General: Skin is warm.      Capillary Refill: Capillary refill takes less than 2 seconds.      Coloration: Skin is not cyanotic, mottled or pale.      Findings: No erythema or rash.   Neurological:      General: No focal deficit present.      Mental Status: She is alert and oriented to person, place,  and time.      Cranial Nerves: No cranial nerve deficit.      Sensory: No sensory deficit.      Motor: No abnormal muscle tone.      Coordination: Coordination normal.   Psychiatric:         Mood and Affect: Mood normal.         Behavior: Behavior normal.         Thought Content: Thought content normal.         Judgment: Judgment normal.         Vital Signs  ED Triage Vitals [12/16/23 2120]   Temperature Pulse Respirations Blood Pressure SpO2   97.8 °F (36.6 °C) 89 18 108/65 98 %      Temp Source Heart Rate Source Patient Position - Orthostatic VS BP Location FiO2 (%)   Tympanic Monitor Sitting Left arm --      Pain Score       6           Vitals:    12/16/23 2120   BP: 108/65   Pulse: 89   Patient Position - Orthostatic VS: Sitting         Visual Acuity      ED Medications  Medications - No data to display    Diagnostic Studies  Results Reviewed       None                   No orders to display              Procedures  Procedures         ED Course  ED Course as of 12/17/23 2115   Sat Dec 16, 2023   2130 PT Seen and evaluated    Here for RLQ pain since Noon  Pt comfortable appearing  Non toxic   Vss and wnl    2210 CBC and differential   2210 UA w Reflex to Microscopic w Reflex to Culture(!)   2231 Urine Microscopic(!)   2231 CMP(!)   2241 PREGNANCY, SERUM: Negative   Sun Dec 17, 2023   0101 CT ABDOMEN AND PELVIS WITH IV CONTRAST  FINDINGS:     ABDOMEN     LOWER CHEST:  No clinically significant abnormality identified in the visualized lower chest.     LIVER/BILIARY TREE:  Unremarkable.     GALLBLADDER:  No calcified gallstones. No pericholecystic inflammatory change.     SPLEEN:  Unremarkable.     PANCREAS:  Unremarkable.     ADRENAL GLANDS:  Unremarkable.     KIDNEYS/URETERS: 3 mm nonobstructing right lower pole intrarenal calculus. No hydronephrosis.     STOMACH AND BOWEL:  Unremarkable.     APPENDIX:  A normal appendix was visualized.     ABDOMINOPELVIC CAVITY:  No ascites.  No pneumoperitoneum.  No  lymphadenopathy.     VESSELS:  Unremarkable for patient's age.     PELVIS     REPRODUCTIVE ORGANS: Intrauterine contraceptive device in position. 3.6 cm circumscribed cystic lesion in the right adnexal region which may reflect paraovarian cyst.     URINARY BLADDER:  Unremarkable.     ABDOMINAL WALL/INGUINAL REGIONS:  Unremarkable.     OSSEOUS STRUCTURES:  No acute fracture or destructive osseous lesion.     IMPRESSION:     No acute inflammatory findings identified in the abdomen or pelvis.     Normal appendix.     3 mm right lower pole nonobstructing intrarenal calculus.     3.6 cm cystic lesion in the right adnexal region likely reflecting paraovarian cyst. This can be further evaluated with pelvic ultrasound as clinically warranted and/or if there is clinical concern for ovarian torsion.   0103   Pt understands work up results  No concerning findings    Pt feels much much better    She has no signs of life or limb threatening process    I offered further tx, I offered admission, if she felt ill, or her pain was too great, but she declined  She is ready to manage from home  She is very appreciative of her ED care and is ready to manage from home  She understands return precautions    She will f/u w/ PCP tomorrow, as well as Gyne                                  SBIRT 22yo+      Flowsheet Row Most Recent Value   Initial Alcohol Screen: US AUDIT-C     1. How often do you have a drink containing alcohol? 0 Filed at: 12/16/2023 2122   2. How many drinks containing alcohol do you have on a typical day you are drinking?  0 Filed at: 12/16/2023 2122   3a. Male UNDER 65: How often do you have five or more drinks on one occasion? 0 Filed at: 12/16/2023 2122   3b. FEMALE Any Age, or MALE 65+: How often do you have 4 or more drinks on one occassion? 0 Filed at: 12/16/2023 2122   Audit-C Score 0 Filed at: 12/16/2023 2122   JESSY: How many times in the past year have you...    Used an illegal drug or used a prescription  medication for non-medical reasons? Never Filed at: 12/16/2023 2122                      Medical Decision Making  Patient with history as above presented with Patient presents with:  Abdominal Pain    History obtained from patient    Patient was nontoxic, stable. Ambulatory. Exam as above.    EKG reviewed.    Labs reviewed.        Differential diagnosis considered. Overall presentation is consistent with ovarian cyst.   Low suspicion for appendicitis, surgical abdomen, ovarian torsion, sepsis, life or limb threatening process    Patient was treated IVF, Toradol, with resolution of symptoms.    No Consideration was given for admission, as the patient was much improved and the ED work up supported the decision that the pt was stable for outpatient management.    Disposition:   Discussed need to follow up with PCP and Gyne   diagnostics, including incidental findings. Discharged with instructions to obtain outpatient follow up of patient's symptoms and findings, with strict return precautions if patient develops new or worsening symptoms.      This medical documentation was created using an electronic medical record system with [employer] voice recognition.  Although this document has been carefully reviewed, there may still be some phonetic and typographical errors.  These errors are purely typographical and due to imperfections of the software program, do not reflect any compromise in the patient's medical care.        Amount and/or Complexity of Data Reviewed  Labs: ordered. Decision-making details documented in ED Course.  Radiology: ordered.    Risk  Prescription drug management.           Disposition  Final diagnoses:   None     ED Disposition       None          Follow-up Information    None         Patient's Medications   Discharge Prescriptions    No medications on file       No discharge procedures on file.    PDMP Review       None            ED Provider  Electronically Signed by             Venkatesh Arguello,  MD  12/18/23 0322

## 2023-12-17 NOTE — DISCHARGE INSTRUCTIONS
RETURN IF WORSE IN ANY WAY:   WORSENING PAIN,   FEVER OR FLU LIKE SYMPTOMS,   OR NEW AND CONCERNING SYMPTOMS SIGNS OR SYMPTOMS      PLEASE CALL YOUR PRIMARY DOCTOR and Gynecologist IN THE MORNING TO SET UP FOLLOW UP for TOMORROW or the Next day  PLEASE REVIEW THE WORK UP RESULTS WITH YOUR DOCTORs  Please continue to drink plenty of fluids.

## 2023-12-18 LAB — BACTERIA UR CULT: NORMAL

## 2023-12-19 ENCOUNTER — OFFICE VISIT (OUTPATIENT)
Dept: OBGYN CLINIC | Facility: CLINIC | Age: 33
End: 2023-12-19
Payer: COMMERCIAL

## 2023-12-19 VITALS
HEIGHT: 68 IN | BODY MASS INDEX: 28.67 KG/M2 | DIASTOLIC BLOOD PRESSURE: 70 MMHG | SYSTOLIC BLOOD PRESSURE: 124 MMHG | WEIGHT: 189.2 LBS

## 2023-12-19 DIAGNOSIS — R10.2 PELVIC PAIN: Primary | ICD-10-CM

## 2023-12-19 DIAGNOSIS — Q50.5 PARA-OVARIAN CYST: ICD-10-CM

## 2023-12-19 PROBLEM — G89.29 CHRONIC PAIN OF BOTH KNEES: Status: RESOLVED | Noted: 2018-05-04 | Resolved: 2023-12-19

## 2023-12-19 PROBLEM — M25.561 CHRONIC PAIN OF BOTH KNEES: Status: RESOLVED | Noted: 2018-05-04 | Resolved: 2023-12-19

## 2023-12-19 PROBLEM — M25.562 CHRONIC PAIN OF BOTH KNEES: Status: RESOLVED | Noted: 2018-05-04 | Resolved: 2023-12-19

## 2023-12-19 PROBLEM — G47.00: Status: ACTIVE | Noted: 2022-02-15

## 2023-12-19 PROBLEM — R63.5 WEIGHT GAIN: Status: ACTIVE | Noted: 2022-02-15

## 2023-12-19 PROBLEM — F43.10 PTSD (POST-TRAUMATIC STRESS DISORDER): Status: ACTIVE | Noted: 2022-02-15

## 2023-12-19 PROBLEM — R53.83 FATIGUE: Status: ACTIVE | Noted: 2022-02-15

## 2023-12-19 PROBLEM — L65.9 HAIR LOSS: Status: ACTIVE | Noted: 2022-02-15

## 2023-12-19 PROCEDURE — 99213 OFFICE O/P EST LOW 20 MIN: CPT | Performed by: OBSTETRICS & GYNECOLOGY

## 2023-12-19 NOTE — PROGRESS NOTES
Patient is here to follow up from visit to the ER on 12/17/2023 for RLQ pain scale 6-7 she reports that she still has the pain sometime is feels like throbbing pain.  Patient reports that this started on Saturday at noon .

## 2023-12-19 NOTE — PATIENT INSTRUCTIONS
Ovarian Torsion   AMBULATORY CARE:   Ovarian torsion  is a condition that causes twisting of a ligament that supports the ovary. The ovary may also become twisted with the fallopian tube. Blood flow to the ovary is reduced or blocked. The lack of blood flow can cause ovary necrosis (tissue death). Ovarian torsion needs immediate care to prevent this from happening. Torsion usually happens to only one ovary but can happen to both.        Common signs and symptoms of ovarian torsion:   Severe pain in your abdomen or pelvis that is worse with movement    Pain that goes to your back or down your side    Nausea and vomiting    Tender abdomen or pelvis    A fever    Seek care immediately if:   You have a fever that is getting higher.    You have new or worsening symptoms, such as increased pain or vomiting.    Call your doctor or gynecologist if:   You have a fever.    You have questions or concerns about your condition or care.    Treatment:  You must see a healthcare provider as soon as possible. You may need any of the following:  Surgery  is used to untwist the ovary. Your surgeon will also check to see if a lack of blood flow damaged the ovary. The ovary may need to be removed. If you have not gone through menopause yet, your surgeon may try to leave the ovary in place. Any cysts on the ovary may be removed. The fallopian tube may also need to be removed if it is twisted with the ovary.    Prescription pain medicine  may be given. Ask your healthcare provider how to take this medicine safely. Some prescription pain medicines contain acetaminophen. Do not take other medicines that contain acetaminophen without talking to your healthcare provider. Too much acetaminophen may cause liver damage. Prescription pain medicine may cause constipation. Ask your healthcare provider how to prevent or treat constipation.    Prevent ovarian torsion:   Know what to do if you are at high risk for ovarian torsion.  An ovarian cyst,  pregnancy, or fertility treatment put you at high risk. Seek immediate care if you have any signs or symptoms of ovarian torsion, such as sudden, severe pelvic pain.    Birth control pills may be prescribed if you are a person of childbearing age.  Certain birth control pills help prevent ovulation. This helps prevent ovarian cysts that may lead to ovarian torsion. Talk to your healthcare provider about family planning, if needed.    Follow up with your doctor or gynecologist as directed:  Write down your questions so you remember to ask them during your visits.  © Copyright [employer] 2023 Information is for End User's use only and may not be sold, redistributed or otherwise used for commercial purposes.  The above information is an  only. It is not intended as medical advice for individual conditions or treatments. Talk to your doctor, nurse or pharmacist before following any medical regimen to see if it is safe and effective for you.  Ovarian Cyst   AMBULATORY CARE:   An ovarian cyst  is a fluid-filled sac that grows in or on an ovary. You have 2 ovaries, 1 on each side of your uterus. They are small, about the shape of an almond. Ovarian cysts are common in women who have regular monthly cycles. During your monthly cycle, eggs are released from the ovaries. The cyst usually contains fluid but may sometimes have blood or tissue in it. Most ovarian cysts are harmless and go away without treatment in a few months. Some cysts can grow large, cause pain, or break open.       Common signs and symptoms  include pressure, bloating or swelling in your lower abdomen on the side of the cyst. You may also have dull or sharp pain that may come and go. The following are less common signs and symptoms:  A dull ache in your lower back and thighs    Unusual vaginal bleeding    Weight gain you did not expect or plan    Pain during your monthly cycle    Tenderness in your breasts    Trouble completely emptying your  bowels or bladder    The need to urinate often    Pain during sex    Call your local emergency number (911 in the [place]) if:   You have severe pain with fever and vomiting.    You have sudden, severe abdominal pain.    You are too weak, faint, or dizzy to stand up.    You are breathing very quickly.    Call your doctor or gynecologist if:   Your periods are early, late, or more painful than usual.    You have questions or concerns about your condition or care.    Treatment  will depend on your age, symptoms, and the kind of cyst you have. You may need any of the following:  Watchful waiting  may be recommended. This means the cyst is not treated right away. You will need to watch for any signs or symptoms that the cyst is growing. You may need to return for one or more ultrasounds after a certain period of time. These will show if your cyst has changed in size.    Medicines:  You may need any of the following:     Birth control pills  may help control your monthly cycle, prevent cysts, or cause them to shrink.    Acetaminophen  decreases pain and fever. It is available without a doctor's order. Ask how much to take and how often to take it. Follow directions. Read the labels of all other medicines you are using to see if they also contain acetaminophen, or ask your doctor or pharmacist. Acetaminophen can cause liver damage if not taken correctly.    NSAIDs , such as ibuprofen, help decrease swelling, pain, and fever. This medicine is available with or without a doctor's order. NSAIDs can cause stomach bleeding or kidney problems in certain people. If you take blood thinner medicine, always ask your healthcare provider if NSAIDs are safe for you. Always read the medicine label and follow directions.    Prescription pain medicine  may be given. Ask your healthcare provider how to take this medicine safely. Some prescription pain medicines contain acetaminophen. Do not take other medicines that contain acetaminophen  without talking to your healthcare provider. Too much acetaminophen may cause liver damage. Prescription pain medicine may cause constipation. Ask your healthcare provider how to prevent or treat constipation.     Take your medicine as directed.  Contact your healthcare provider if you think your medicine is not helping or if you have side effects. Tell your provider if you are allergic to any medicine. Keep a list of the medicines, vitamins, and herbs you take. Include the amounts, and when and why you take them. Bring the list or the pill bottles to follow-up visits. Carry your medicine list with you in case of an emergency.    Surgery  may be needed to remove the ovarian cyst.    Manage ovarian cysts:  You can manage a current cyst and help healthcare providers find future cysts early.  Apply heat to decrease pain and cramping from a cyst.  Sit in a warm bath, or place a heating pad (turned on low) on your abdomen. Do this for 15 to 20 minutes every hour for comfort.    Get regular pelvic exams or Pap smears.  This will help providers find any new ovarian cysts. Tell your healthcare provider about any unusual changes in your monthly cycle.    Follow up with your doctor or gynecologist as directed:  Write down your questions so you remember to ask them during your visits.  © Copyright [employer] 2023 Information is for End User's use only and may not be sold, redistributed or otherwise used for commercial purposes.  The above information is an  only. It is not intended as medical advice for individual conditions or treatments. Talk to your doctor, nurse or pharmacist before following any medical regimen to see if it is safe and effective for you.

## 2023-12-19 NOTE — PROGRESS NOTES
[address] [facility] E ED  EMERGENCY DEPARTMENT ENCOUNTER      Pt Name: Mortimer Needle  MRN: [id number]  [place] 1980  Date of evaluation: 7/13/2021  Provider: Kerby Lennox, MD    CHIEF COMPLAINT       Chief Complaint   Patient presents with    Ankle Pain     right ankle; twisted in hole; one week ago       HISTORY OF PRESENT ILLNESS  (Location/Symptom, Timing/Onset, Context/Setting, Quality, Duration, Modifying Factors, Severity.)   Mortimer Needle is a 41 y.o. female who presents to the emergency department complaining of right ankle pain. She relates is on the outer aspect. She slipped in a hole and twisted it 1 week ago. She has been walking on it since but relates it is still bothering her. She has not really noticed too much swelling. It is more pain that brought her in. She is not had trouble with his ankle previously. She has not followed up with her family doctor for it. She has not been taking anything for it. Nursing Notes were reviewed. REVIEW OF SYSTEMS    (2-9 systems for level 4, 10 or more for level 5)     Review of Systems   Musculoskeletal:        Right ankle pain   All other systems reviewed and are negative. Except as noted above the remainder of the review of systems was reviewed and negative.        PAST MEDICAL HISTORY     Past Medical History:   Diagnosis Date    Asthma     history    Bipolar affect, depressed ([address]     ON MEDS    Environmental allergies     GERD (gastroesophageal reflux disease)     Lumbar pain     PVD (peripheral vascular disease) ([facility] [address]        SURGICAL HISTORY       Past Surgical History:   Procedure Laterality Date    DENTAL SURGERY  07/17/2013    extraction   x  2  teeth    DILATION AND CURETTAGE OF UTERUS  2010    WISDOM TOOTH EXTRACTION         CURRENT MEDICATIONS       Previous Medications    ALBUTEROL (PROVENTIL) (2.5 MG/3ML) 0.083% NEBULIZER SOLUTION    Take 3 mLs by nebulization every 6 hours as needed for Wheezing Diagnoses and all orders for this visit:    Pelvic pain  -     US pelvis complete w transvaginal; Future    Para-ovarian cyst  -     US pelvis complete w transvaginal; Future    TVUS ordered   Torsion precaustions given       Subjective    CC: Problem visit     Evette Mei is a 33 y.o. female   Presents as an ED follow-up.  Reports right lower pelvic pain, intermittent in nature, pain level 6 out of 10, there is a constant feeling of fullness in that area. This has been occurring since 21  LMP end of last month, cycles are regular, not SA at this time   She is currently on Keflex for treatment of a UTI  Advise she may take Tylenol or ibuprofen for pain as needed ibuprofen is to be taken with food  Hx of Ovarian cyst in high school     No LMP recorded. Patient has had an implant.    Past Medical History:   Diagnosis Date    Acid reflux     silent refulx    Anorexia     Bulimia     Hemorrhoids     MRSA infection     in R underarm     Past Surgical History:   Procedure Laterality Date    NO PAST SURGERIES         Immunization History   Administered Date(s) Administered    COVID-19 J&J (Viewster) vaccine 0.5 mL 2021    COVID-19 PFIZER VACCINE 0.3 ML IM 2021    INFLUENZA 10/17/2014    Tdap 2021       Family History   Problem Relation Age of Onset    Deep vein thrombosis Mother     Gallbladder disease Mother     Hypertension Father     Coronary artery disease Father     Scoliosis Sister     ADD / ADHD Sister     Gallbladder disease Sister     Uterine cancer Maternal Grandmother     Breast cancer Other 60    Colon cancer Neg Hx     Ovarian cancer Neg Hx     Cervical cancer Neg Hx      Social History     Tobacco Use    Smoking status: Never    Smokeless tobacco: Never   Vaping Use    Vaping status: Never Used   Substance Use Topics    Alcohol use: Yes     Alcohol/week: 5.0 standard drinks of alcohol     Types: 5 Glasses of wine per week     Comment: cant drink because the reflux.  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     Drug use: No       Current Outpatient Medications:     cephalexin (KEFLEX) 500 mg capsule, Take 1 capsule (500 mg total) by mouth every 12 (twelve) hours for 5 days, Disp: 10 capsule, Rfl: 0    PARAGARD INTRAUTERINE COPPER IU, by Intrauterine route, Disp: , Rfl:     famotidine (PEPCID) 20 mg tablet, Take 1 tablet (20 mg total) by mouth daily, Disp: 30 tablet, Rfl: 0  Patient Active Problem List    Diagnosis Date Noted    Subacromial impingement of left shoulder 2023    Fatigue 02/15/2022    Hair loss 02/15/2022    PTSD (post-traumatic stress disorder) 02/15/2022    Unable to sleep 02/15/2022    Weight gain 02/15/2022    Nipple discharge 2021    LPRD (laryngopharyngeal reflux disease) 10/18/2019    Irregular uterine bleeding 10/07/2019    Patellofemoral dysfunction of left knee 05/10/2019    Pelvic pain 2018    Acute internal derangement of left knee 2018       Allergies   Allergen Reactions    Sulfa Antibiotics Lip Swelling       OB History    Para Term  AB Living   1   0   1 0   SAB IAB Ectopic Multiple Live Births   1       0      # Outcome Date GA Lbr Miguel/2nd Weight Sex Delivery Anes PTL Lv   1 SAB                Vitals:    23 1529   BP: 124/70   BP Location: Left arm   Patient Position: Sitting   Cuff Size: Large   Weight: 85.8 kg (189 lb 3.2 oz)   Height: 5' 8\" (1.727 m)     Body mass index is 28.77 kg/m².    Review of Systems     Constitutional: Negative for chills, fatigue, fever, headaches, visual disturbances, and unexpected weight change.   Respiratory: Negative for cough, & shortness of breath.  Cardiovascular: Negative for chest pain. .    Gastrointestinal: Negative for Abd pain, nausea & vomiting, constipation and diarrhea.   Genitourinary: Negative for difficulty urinating, dysuria, hematuria, dyspareunia, unusual vaginal bleeding or discharge  Skin: Negative skin changes    Physical Exam " ALBUTEROL SULFATE  (90 BASE) MCG/ACT INHALER    Inhale 2 puffs into the lungs every 6 hours as needed for Wheezing or Shortness of Breath Indications: Wheezing    CITALOPRAM (CELEXA) 40 MG TABLET    Take 40 mg by mouth every morning. ONDANSETRON (ZOFRAN) 4 MG/2ML INJECTION    Infuse 2 mLs intravenously once as needed for Nausea for 1 dose. TRAZODONE (DESYREL) 50 MG TABLET    Take 50 mg by mouth nightly. ALLERGIES     Ampicillin, Bee venom, Codeine, Morphine, and Adhesive tape    FAMILY HISTORY     History reviewed. No pertinent family history. No family status information on file. SOCIAL HISTORY      reports that she has never smoked. She has never used smokeless tobacco. She reports that she does not drink alcohol and does not use drugs. PHYSICAL EXAM    (up to 7 for level 4, 8 or more for level 5)     ED Triage Vitals   BP Temp Temp Source Pulse Resp SpO2 Height Weight   07/13/21 1909 07/13/21 1909 07/13/21 1908 07/13/21 1909 07/13/21 1909 07/13/21 1909 07/13/21 1908 07/13/21 1908   118/87 98 °F (36.7 °C) Oral 84 17 97 % 5' 2\" (1.575 m) 276 lb (125.2 kg)     Physical Exam  Vitals and nursing note reviewed. Constitutional:       General: She is not in acute distress. Appearance: She is well-developed. She is not ill-appearing, toxic-appearing or diaphoretic. HENT:      Head: Normocephalic and atraumatic. Right Ear: External ear normal.      Left Ear: External ear normal.      Nose: Nose normal.   Eyes:      General:         Right eye: No discharge. Left eye: No discharge. Conjunctiva/sclera: Conjunctivae normal.      Pupils: Pupils are equal, round, and reactive to light. Cardiovascular:      Rate and Rhythm: Normal rate and regular rhythm. Heart sounds: Normal heart sounds. No murmur heard. Pulmonary:      Effort: Pulmonary effort is normal. No respiratory distress. Breath sounds: Normal breath sounds. No wheezing, rhonchi or rales.    Chest:     Constitutional: Alert & Oriented x3, well-developed and well-nourished. No distress.   HENT: Atraumatic, Normocephalic,   Neck: Normal range of motion.   Pulmonary: Effort normal.   Abdominal: Soft. No tenderness or masses  Musculoskeletal: Normal ROM  Skin: Warm & Dry  Psychological: Normal mood, thought content, behavior & judgement     No pelvic exam    earlier to podiatry and this number and name have been given for this purpose. CONSULTS:  None    PROCEDURES:  Patient splinted for comfort. Tolerates well. No change in PMS following procedure. Crutch training provided by RN. FINAL IMPRESSION      1.  Sprain of right ankle, unspecified ligament, initial encounter          DISPOSITION/PLAN   DISPOSITION Decision To Discharge 07/13/2021 07:50:06 PM      PATIENT REFERRED TO:  Mikal Woods, [facility] [address]  [address] [place] [address],[facility] Floor 1  [place]   [phone]    Call in 1 day        DISCHARGE MEDICATIONS:  New Prescriptions    IBUPROFEN (ADVIL;MOTRIN) 800 MG TABLET    Take 1 tablet by mouth every 8 hours as needed for Pain       (Please note that portions of this note were completed with a voice recognition program.  Efforts were made to edit the dictations but occasionally words are mis-transcribed.)    Bella Carmichael MD  Attending Emergency Physician        Bella Carmichael MD  07/13/21 [facility] [address], MD  07/13/21 2019

## 2023-12-22 ENCOUNTER — HOSPITAL ENCOUNTER (OUTPATIENT)
Dept: ULTRASOUND IMAGING | Facility: HOSPITAL | Age: 33
End: 2023-12-22
Payer: COMMERCIAL

## 2023-12-22 DIAGNOSIS — R10.2 PELVIC PAIN: ICD-10-CM

## 2023-12-22 DIAGNOSIS — Q50.5 PARA-OVARIAN CYST: ICD-10-CM

## 2023-12-22 PROCEDURE — 76830 TRANSVAGINAL US NON-OB: CPT

## 2023-12-22 PROCEDURE — 76856 US EXAM PELVIC COMPLETE: CPT

## 2023-12-29 ENCOUNTER — TELEPHONE (OUTPATIENT)
Dept: OBGYN CLINIC | Facility: CLINIC | Age: 33
End: 2023-12-29

## 2023-12-29 NOTE — TELEPHONE ENCOUNTER
Patient was seen in office on 12/19 and was sent for an ultrasound she has received the ultrasound . Her mother called and stated no one has reached put to discuss ultrasound results as of yet regarding cyst. Please advise.

## 2024-01-03 ENCOUNTER — TELEPHONE (OUTPATIENT)
Dept: OBGYN CLINIC | Facility: CLINIC | Age: 34
End: 2024-01-03

## 2024-01-24 ENCOUNTER — APPOINTMENT (OUTPATIENT)
Dept: LAB | Facility: CLINIC | Age: 34
End: 2024-01-24
Payer: COMMERCIAL

## 2024-01-24 ENCOUNTER — OFFICE VISIT (OUTPATIENT)
Dept: FAMILY MEDICINE CLINIC | Facility: CLINIC | Age: 34
End: 2024-01-24
Payer: COMMERCIAL

## 2024-01-24 VITALS
DIASTOLIC BLOOD PRESSURE: 78 MMHG | BODY MASS INDEX: 28.67 KG/M2 | TEMPERATURE: 96.4 F | OXYGEN SATURATION: 98 % | HEIGHT: 68 IN | HEART RATE: 76 BPM | WEIGHT: 189.2 LBS | SYSTOLIC BLOOD PRESSURE: 114 MMHG

## 2024-01-24 DIAGNOSIS — G47.09 OTHER INSOMNIA: Primary | ICD-10-CM

## 2024-01-24 DIAGNOSIS — L30.9 DERMATITIS: ICD-10-CM

## 2024-01-24 DIAGNOSIS — G47.09 OTHER INSOMNIA: ICD-10-CM

## 2024-01-24 DIAGNOSIS — Z23 ENCOUNTER FOR IMMUNIZATION: ICD-10-CM

## 2024-01-24 LAB
ALBUMIN SERPL BCP-MCNC: 4.7 G/DL (ref 3.5–5)
ALP SERPL-CCNC: 63 U/L (ref 34–104)
ALT SERPL W P-5'-P-CCNC: 9 U/L (ref 7–52)
ANION GAP SERPL CALCULATED.3IONS-SCNC: 3 MMOL/L
AST SERPL W P-5'-P-CCNC: 20 U/L (ref 13–39)
BASOPHILS # BLD AUTO: 0.06 THOUSANDS/ÂΜL (ref 0–0.1)
BASOPHILS NFR BLD AUTO: 1 % (ref 0–1)
BILIRUB SERPL-MCNC: 0.89 MG/DL (ref 0.2–1)
BUN SERPL-MCNC: 13 MG/DL (ref 5–25)
CALCIUM SERPL-MCNC: 9.4 MG/DL (ref 8.4–10.2)
CHLORIDE SERPL-SCNC: 104 MMOL/L (ref 96–108)
CHOLEST SERPL-MCNC: 226 MG/DL
CO2 SERPL-SCNC: 30 MMOL/L (ref 21–32)
CREAT SERPL-MCNC: 0.65 MG/DL (ref 0.6–1.3)
EOSINOPHIL # BLD AUTO: 0.2 THOUSAND/ÂΜL (ref 0–0.61)
EOSINOPHIL NFR BLD AUTO: 3 % (ref 0–6)
ERYTHROCYTE [DISTWIDTH] IN BLOOD BY AUTOMATED COUNT: 12.6 % (ref 11.6–15.1)
GFR SERPL CREATININE-BSD FRML MDRD: 115 ML/MIN/1.73SQ M
GLUCOSE P FAST SERPL-MCNC: 89 MG/DL (ref 65–99)
HCT VFR BLD AUTO: 45.4 % (ref 34.8–46.1)
HDLC SERPL-MCNC: 57 MG/DL
HGB BLD-MCNC: 15.1 G/DL (ref 11.5–15.4)
IMM GRANULOCYTES # BLD AUTO: 0.02 THOUSAND/UL (ref 0–0.2)
IMM GRANULOCYTES NFR BLD AUTO: 0 % (ref 0–2)
LDLC SERPL CALC-MCNC: 154 MG/DL (ref 0–100)
LYMPHOCYTES # BLD AUTO: 1.96 THOUSANDS/ÂΜL (ref 0.6–4.47)
LYMPHOCYTES NFR BLD AUTO: 30 % (ref 14–44)
MCH RBC QN AUTO: 30.9 PG (ref 26.8–34.3)
MCHC RBC AUTO-ENTMCNC: 33.3 G/DL (ref 31.4–37.4)
MCV RBC AUTO: 93 FL (ref 82–98)
MONOCYTES # BLD AUTO: 0.64 THOUSAND/ÂΜL (ref 0.17–1.22)
MONOCYTES NFR BLD AUTO: 10 % (ref 4–12)
NEUTROPHILS # BLD AUTO: 3.71 THOUSANDS/ÂΜL (ref 1.85–7.62)
NEUTS SEG NFR BLD AUTO: 56 % (ref 43–75)
NONHDLC SERPL-MCNC: 169 MG/DL
NRBC BLD AUTO-RTO: 0 /100 WBCS
PLATELET # BLD AUTO: 336 THOUSANDS/UL (ref 149–390)
PMV BLD AUTO: 10.3 FL (ref 8.9–12.7)
POTASSIUM SERPL-SCNC: 4 MMOL/L (ref 3.5–5.3)
PROT SERPL-MCNC: 7.5 G/DL (ref 6.4–8.4)
RBC # BLD AUTO: 4.88 MILLION/UL (ref 3.81–5.12)
SODIUM SERPL-SCNC: 137 MMOL/L (ref 135–147)
TRIGL SERPL-MCNC: 75 MG/DL
TSH SERPL DL<=0.05 MIU/L-ACNC: 1.25 UIU/ML (ref 0.45–4.5)
WBC # BLD AUTO: 6.59 THOUSAND/UL (ref 4.31–10.16)

## 2024-01-24 PROCEDURE — 99213 OFFICE O/P EST LOW 20 MIN: CPT

## 2024-01-24 PROCEDURE — 80061 LIPID PANEL: CPT

## 2024-01-24 PROCEDURE — 80053 COMPREHEN METABOLIC PANEL: CPT

## 2024-01-24 PROCEDURE — 84443 ASSAY THYROID STIM HORMONE: CPT

## 2024-01-24 PROCEDURE — 36415 COLL VENOUS BLD VENIPUNCTURE: CPT

## 2024-01-24 PROCEDURE — 85025 COMPLETE CBC W/AUTO DIFF WBC: CPT

## 2024-01-24 PROCEDURE — 86003 ALLG SPEC IGE CRUDE XTRC EA: CPT

## 2024-01-24 PROCEDURE — 82785 ASSAY OF IGE: CPT

## 2024-01-24 RX ORDER — TRAZODONE HYDROCHLORIDE 50 MG/1
50 TABLET ORAL
Qty: 30 TABLET | Refills: 0 | Status: SHIPPED | OUTPATIENT
Start: 2024-01-24
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
## 2024-01-25 LAB
ALMOND IGE QN: <0.1 KUA/I
CASHEW NUT IGE QN: <0.1 KUA/I
CODFISH IGE QN: <0.1 KUA/I
EGG WHITE IGE QN: <0.1 KUA/I
GLUTEN IGE QN: <0.1 KUA/I
HAZELNUT IGE QN: <0.1 KUA/L
MILK IGE QN: <0.1 KUA/I
PEANUT IGE QN: <0.1 KUA/I
SALMON IGE QN: <0.1 KUA/I
SCALLOP IGE QN: <0.1 KUA/L
SESAME SEED IGE QN: <0.1 KUA/I
SHRIMP IGE QN: <0.1 KUA/L
SOYBEAN IGE QN: <0.1 KUA/I
TOTAL IGE SMQN RAST: 3.53 KU/L (ref 0–113)
TUNA IGE QN: <0.1 KUA/I
WALNUT IGE QN: <0.1 KUA/I
WHEAT IGE QN: <0.1 KUA/I

## 2024-03-18 ENCOUNTER — OFFICE VISIT (OUTPATIENT)
Dept: URGENT CARE | Facility: CLINIC | Age: 34
End: 2024-03-18
Payer: COMMERCIAL

## 2024-03-18 VITALS
RESPIRATION RATE: 18 BRPM | OXYGEN SATURATION: 96 % | DIASTOLIC BLOOD PRESSURE: 68 MMHG | SYSTOLIC BLOOD PRESSURE: 115 MMHG | TEMPERATURE: 98.7 F | HEART RATE: 84 BPM

## 2024-03-18 DIAGNOSIS — H00.022 HORDEOLUM INTERNUM OF RIGHT LOWER EYELID: Primary | ICD-10-CM

## 2024-03-18 PROCEDURE — 99214 OFFICE O/P EST MOD 30 MIN: CPT | Performed by: NURSE PRACTITIONER

## 2024-03-18 RX ORDER — ERYTHROMYCIN 5 MG/G
0.5 OINTMENT OPHTHALMIC
Qty: 42 G | Refills: 0 | Status: SHIPPED | OUTPATIENT
Start: 2024-03-18 | End: 2024-03-25

## 2024-03-18 NOTE — PATIENT INSTRUCTIONS
You appear to have a stye in the right lower eye lid. You have been prescribed erythromycin eye ointment - use as prescribed.  Apply warm compresses  Follow up with your PCP in 3-5 days  Go to the ED if symptoms worsen   See your eye doctor in  72 hours for follow up; sooner if symptoms worsen

## 2024-03-18 NOTE — PROGRESS NOTES
"  [facility]        NAME: Evette Mei is a 33 y.o. female  : 1990    MRN: [id number]  DATE: 2024  TIME: 10:06 AM    Assessment and Plan   Hordeolum internum of right lower eyelid [H00.022]  1. Hordeolum internum of right lower eyelid  erythromycin (ILOTYCIN) ophthalmic ointment            Patient Instructions       Follow up with PCP in 3-5 days.  Proceed to  ER if symptoms worsen.    If tests have been performed at [facility], our office will contact you with results if changes need to be made to the care plan discussed with you at the visit.  You can review your full results on [facility]'s MyChart.      You appear to have a stye in the right lower eye lid. You have been prescribed erythromycin eye ointment - use as prescribed.  Apply warm compresses  Follow up with your PCP in 3-5 days  Go to the ED if symptoms worsen   See your eye doctor in  72 hours for follow up; sooner if symptoms worsen      Chief Complaint     Chief Complaint   Patient presents with    Eye Problem     C/o right eye \"stye\" onset \"yesterday\", pt reports utilizing warm compresses, no improvement. Pt reports \"today the pain is going down into my cheek\". Denies assessing for fever. Denies any visual changes. Pt reports minimal drainage from site. Denies any OTC medications.         History of Present Illness       This is a 33 year old female who states has had a stye before and noted in right eye yesterday a stye with pain. She has been using warm compresses w/o improvement.  She states that pain is radiating in to the lower eye lid; cheek area.  She denies visual changes.  Minimal drainage from eye.  PMH is listed.     Eye Problem   Associated symptoms include eye redness. Pertinent negatives include no eye discharge, itching or photophobia.       Review of Systems   Review of Systems   Constitutional: Negative.    HENT:  Positive for facial swelling.    Eyes:  Positive for pain and redness. Negative for " photophobia, discharge, itching and visual disturbance.   Respiratory: Negative.     Cardiovascular: Negative.    Gastrointestinal: Negative.    Endocrine: Negative.    Genitourinary: Negative.    Musculoskeletal: Negative.    Skin: Negative.    Allergic/Immunologic: Negative.    Neurological: Negative.    Hematological: Negative.    Psychiatric/Behavioral: Negative.           Current Medications       Current Outpatient Medications:     erythromycin (ILOTYCIN) ophthalmic ointment, Administer 0.5 inches to the right eye every 4 (four) hours for 7 days, Disp: 42 g, Rfl: 0    PARAGARD INTRAUTERINE COPPER IU, by Intrauterine route, Disp: , Rfl:     traZODone (DESYREL) 50 mg tablet, Take 1 tablet (50 mg total) by mouth daily at bedtime as needed for sleep, Disp: 30 tablet, Rfl: 0    Current Allergies     Allergies as of 03/18/2024 - Reviewed 03/18/2024   Allergen Reaction Noted    Sulfa antibiotics Lip Swelling 11/28/2018            The following portions of the patient's history were reviewed and updated as appropriate: allergies, current medications, past family history, past medical history, past social history, past surgical history and problem list.     Past Medical History:   Diagnosis Date    Acid reflux     silent refulx    Anorexia     Bulimia     Hemorrhoids     MRSA infection 2014    in R underarm       Past Surgical History:   Procedure Laterality Date    NO PAST SURGERIES         Family History   Problem Relation Age of Onset    Deep vein thrombosis Mother     Gallbladder disease Mother     Hypertension Father     Coronary artery disease Father     Scoliosis Sister     ADD / ADHD Sister     Gallbladder disease Sister     Uterine cancer Maternal Grandmother     Breast cancer Other 60    Colon cancer Neg Hx     Ovarian cancer Neg Hx     Cervical cancer Neg Hx          Medications have been verified.        Objective   /68 (BP Location: Right arm, Patient Position: Sitting, Cuff Size: Standard)   Pulse  84   Temp 98.7 °F (37.1 °C) (Temporal)   Resp 18   SpO2 96%   No LMP recorded. Patient has had an implant.       Physical Exam     Physical Exam  Vitals and nursing note reviewed.   Constitutional:       General: She is not in acute distress.     Appearance: Normal appearance. She is normal weight. She is not ill-appearing, toxic-appearing or diaphoretic.   HENT:      Head: Normocephalic and atraumatic.      Nose: Nose normal.      Mouth/Throat:      Mouth: Mucous membranes are moist.   Eyes:      General: Vision grossly intact. Gaze aligned appropriately. No visual field deficit.        Right eye: Hordeolum present. No discharge.         Left eye: No discharge.      Extraocular Movements: Extraocular movements intact.      Conjunctiva/sclera:      Right eye: Right conjunctiva is injected.      Pupils: Pupils are equal, round, and reactive to light.      Comments: Right lower eye lid slightly swollen. Conjunctiva injected/red.  There is a small pinhead lesion at the inner right lower lid.  No drainage but eye is watery/tearing. There is some redness just below the lashes with TTP    Neurological:      Mental Status: She is alert.

## 2024-03-19 ENCOUNTER — TELEPHONE (OUTPATIENT)
Dept: URGENT CARE | Facility: CLINIC | Age: 34
End: 2024-03-19

## 2024-03-19 DIAGNOSIS — L03.211 FACIAL CELLULITIS: Primary | ICD-10-CM

## 2024-03-19 RX ORDER — CLINDAMYCIN HYDROCHLORIDE 300 MG/1
300 CAPSULE ORAL 3 TIMES DAILY
Qty: 21 CAPSULE | Refills: 0 | Status: SHIPPED | OUTPATIENT
Start: 2024-03-19 | End: 2024-03-26

## 2024-03-19 NOTE — TELEPHONE ENCOUNTER
"Patient called and LM on VM stating that the swelling around the eye is getting worse.  She states she is using the erythromycin eye abx ointment w/o relief.  It has only been 24 hours.  She admits she did not call for an eye appt in 72 hours as instructed at discharge.  She states she does have a PCP appt on 3/21.   Will fax clindamycin to pharmacy listed.  She was instructed to take a probiotic with this and asked if \"German yogurt\" would be ok.  Informed she can try that first.  She is instructed to go to ED if symptoms worsen and still follow up with eye.  She verbalizes understanding.   "

## 2024-03-21 ENCOUNTER — OFFICE VISIT (OUTPATIENT)
Dept: FAMILY MEDICINE CLINIC | Facility: CLINIC | Age: 34
End: 2024-03-21
Payer: COMMERCIAL

## 2024-03-21 VITALS
WEIGHT: 196 LBS | HEART RATE: 87 BPM | DIASTOLIC BLOOD PRESSURE: 68 MMHG | HEIGHT: 68 IN | SYSTOLIC BLOOD PRESSURE: 102 MMHG | TEMPERATURE: 98.9 F | OXYGEN SATURATION: 99 % | BODY MASS INDEX: 29.7 KG/M2

## 2024-03-21 DIAGNOSIS — E78.00 HYPERCHOLESTEROLEMIA: ICD-10-CM

## 2024-03-21 DIAGNOSIS — F41.9 ANXIETY: Primary | ICD-10-CM

## 2024-03-21 DIAGNOSIS — G47.00 INSOMNIA, UNSPECIFIED TYPE: ICD-10-CM

## 2024-03-21 PROCEDURE — 99213 OFFICE O/P EST LOW 20 MIN: CPT | Performed by: FAMILY MEDICINE

## 2024-03-21 RX ORDER — BUSPIRONE HYDROCHLORIDE 5 MG/1
5 TABLET ORAL 2 TIMES DAILY
Qty: 180 TABLET | Refills: 3 | Status: SHIPPED | OUTPATIENT
Start: 2024-03-21

## 2024-03-21 NOTE — PROGRESS NOTES
"Assessment/Plan:    No problem-specific Assessment & Plan notes found for this encounter.       Diagnoses and all orders for this visit:    Anxiety  -     busPIRone (BUSPAR) 5 mg tablet; Take 1 tablet (5 mg total) by mouth 2 (two) times a day    Insomnia, unspecified type  -     busPIRone (BUSPAR) 5 mg tablet; Take 1 tablet (5 mg total) by mouth 2 (two) times a day    Hypercholesterolemia  Comments:  pt counseled on diet and exercise          PHQ-2/9 Depression Screening    Little interest or pleasure in doing things: 0 - not at all  Feeling down, depressed, or hopeless: 0 - not at all  PHQ-2 Score: 0  PHQ-2 Interpretation: Negative depression screen            Subjective:      Patient ID: Evette Mei is a 33 y.o. female.    Follow up for insomnia, pt tried trazodone which did not help, pt tried melatonin, sleep hygiene which did not help, pt has had insomnia for a couple months since a traumatic event        The following portions of the patient's history were reviewed and updated as appropriate: allergies, current medications, past family history, past medical history, past social history, past surgical history, and problem list.    Review of Systems   Eyes:  Negative for visual disturbance.   Cardiovascular:  Negative for chest pain and palpitations.   Neurological:  Negative for headaches.   Psychiatric/Behavioral:  Positive for sleep disturbance. Negative for suicidal ideas. The patient is not nervous/anxious.          Objective:    /68   Pulse 87   Temp 98.9 °F (37.2 °C) (Tympanic)   Ht 5' 8\" (1.727 m)   Wt 88.9 kg (196 lb)   SpO2 99%   BMI 29.80 kg/m²      Physical Exam  Vitals and nursing note reviewed.   Constitutional:       General: She is not in acute distress.     Appearance: Normal appearance. She is not ill-appearing, toxic-appearing or diaphoretic.   HENT:      Head: Normocephalic and atraumatic.   Eyes:      Conjunctiva/sclera: Conjunctivae normal.   Cardiovascular:      Rate and " Rhythm: Normal rate and regular rhythm.      Heart sounds: Normal heart sounds. No murmur heard.  Pulmonary:      Effort: Pulmonary effort is normal. No respiratory distress.      Breath sounds: Normal breath sounds. No wheezing, rhonchi or rales.   Abdominal:      General: There is no distension.      Palpations: Abdomen is soft. There is no mass.      Tenderness: There is no abdominal tenderness. There is no guarding or rebound.   Musculoskeletal:      Cervical back: Normal range of motion and neck supple. No rigidity.      Right lower leg: No edema.      Left lower leg: No edema.   Lymphadenopathy:      Cervical: No cervical adenopathy.   Neurological:      Mental Status: She is alert and oriented to person, place, and time.   Psychiatric:         Mood and Affect: Mood normal.         Behavior: Behavior normal.         Thought Content: Thought content normal.         Judgment: Judgment normal.

## 2024-04-01 ENCOUNTER — TELEPHONE (OUTPATIENT)
Dept: FAMILY MEDICINE CLINIC | Facility: CLINIC | Age: 34
End: 2024-04-01

## 2024-04-01 NOTE — TELEPHONE ENCOUNTER
Pt called wanting to make you aware she had a reaction to Clindamycin, she had rash covering lower extremities. She would like this added to allergy list.

## 2024-07-15 ENCOUNTER — TELEPHONE (OUTPATIENT)
Age: 34
End: 2024-07-15

## 2024-07-15 NOTE — TELEPHONE ENCOUNTER
Pt called stating she was exposed to covid last week.  Said she had some very mild symptoms at that point and took multiple OTC covid tests which came back negative.  Pt started with a sore throat last night, post nasal drip and congestion and tested positive today for covid.    Pt did not feel it necessary to come in for an appointment and asking for Paxlovid to [facility].    [facility] called and did verify with [facility] that they do have it in stock.    Please advise pt

## 2024-07-30 ENCOUNTER — TELEPHONE (OUTPATIENT)
Dept: GASTROENTEROLOGY | Facility: CLINIC | Age: 34
End: 2024-07-30

## 2024-07-30 NOTE — TELEPHONE ENCOUNTER
Received message from Dr. Mei to schedule this patient for a colonoscopy. Diagnosis would be family history of polyps, family history of colon cancer.     Left message for patient to advise to call back to schedule office visit as this is required prior to scheduling colonoscopy due to her age.    Dr. Mei requested patient to see Cele Fuentes in [place].

## 2024-08-06 ENCOUNTER — TELEPHONE (OUTPATIENT)
Age: 34
End: 2024-08-06

## 2024-08-06 NOTE — TELEPHONE ENCOUNTER
Care Due:                  Date            Visit Type   Department     Provider  --------------------------------------------------------------------------------    Last Visit: None Found      None         None Found  Next Visit: None Scheduled  None         None Found                                                            Last  Test          Frequency    Reason                     Performed    Due Date  --------------------------------------------------------------------------------    Office Visit  12 months..  telmisartan..............  Not Found    Overdue    CMP.........  12 months..  telmisartan..............  Not Found    Overdue    [employer] Embedded Care Gaps. Reference number: [id number]. 5/07/2022   11:38:10 AM CDT   TX of care from Dr. Juárez to Dr. Pepe due to location.

## 2024-08-26 NOTE — PROGRESS NOTES
[facility] - Outpatient Follow-up Note  Evette Mei 34 y.o. female MRN: [id number]  Encounter: [id number]          ASSESSMENT AND PLAN:    I have personally reviewed the pertinent lab values, radiology images, imaging reports and endoscopy/pathology reports.    Problem List Items Addressed This Visit    None  Visit Diagnoses       Family history of colon cancer    -  Primary    Relevant Medications    polyethylene glycol (GOLYTELY) 4000 mL solution    Other Relevant Orders    Colonoscopy    Nausea              Fhx of colon cancer/colonic polyps  One second-degree relative with colon cancer, one or two first-degree relative with colonic polyp (unclear if it's advanced adenoma), and multiple second-degree relatives with colonic polyp. Given her sister had colonic polyps in her 30s, will schedule screening colonoscopy now. Also given high risk due to family history as above, recommend q5yr interval screening pending colonoscopy results  - schedule colonoscopy with Golytely prep    Bubbling sensation in her neck  Nausea  No classic symptoms of GERD. EGD without esophagitis. No red flags. Ddx includes globus sensation, visceral hypersensitivity, GERD, etc. Tried famotidine, omeprazole and pantoprazole in the past which led to vomiting. Acid watcher diet worked  - monitor her symptoms  - lifestyle modification  - may benefit from manometry, will discuss next visit if further testing is warranted    RTC after procedure    ______________________________________________________________________    HPI:    34F PMH LPR, Fhx of colon cancer in PGF here for crc screening    Last seen by Dr. Juárez on 4/27/2023 re: GERD/LPR s/p EGD - EGD in May 2023 showing mild, patchy erythematous mucosa in the antrum s/p bx negative for HP    Reports bubbling sensation in her neck and nausea but no heartburn.   Tried famotidine 20 mg daily for heartburn which led to vomiting.   Tried omeprazole and  pantoprazole but led to violent vomiting  Tried acid watcher diet which helps.  Denies dysphagia, odynophagia, regurgitation, unintentional weight loss.    Very rarely gets severe abdominal pain which she attributes to gas pain. Last time was about one year ago.   Denies diarrhea, constipation, BRBPR, melena.     Fhx: PGF with colon cancer (passed away from 56), paternal uncle, father and sister with colonic polyps, MGM with ovarian cancer, mother side of the family with breast cancer  Of note, father is a gastroenterologist.     REVIEW OF SYSTEMS (ROS):   All other systems were negative unless specified in HPI.     Historical Information   Past Medical History:   Diagnosis Date    Acid reflux     silent refulx    Anorexia     Bulimia     Hemorrhoids     MRSA infection 2014    in R underarm     Past Surgical History:   Procedure Laterality Date    NO PAST SURGERIES       Social History   Social History     Substance and Sexual Activity   Alcohol Use Yes    Alcohol/week: 5.0 standard drinks of alcohol    Types: 5 Glasses of wine per week    Comment: cant drink because the reflux. 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      Social History     Substance and Sexual Activity   Drug Use No     Social History     Tobacco Use   Smoking Status Never    Passive exposure: Never   Smokeless Tobacco Never     Family History   Problem Relation Age of Onset    Deep vein thrombosis Mother     Gallbladder disease Mother     Hypertension Father     Coronary artery disease Father     Scoliosis Sister     ADD / ADHD Sister     Gallbladder disease Sister     Uterine cancer Maternal Grandmother     Breast cancer Other 60    Colon cancer Neg Hx     Ovarian cancer Neg Hx     Cervical cancer Neg Hx        Meds/Allergies       Current Outpatient Medications:     PARAGARD INTRAUTERINE COPPER IU    polyethylene glycol (GOLYTELY) 4000 mL solution    traZODone (DESYREL) 50 mg tablet    Allergies   Allergen Reactions    Sulfa  Antibiotics Lip Swelling           Objective     Blood pressure 98/56, pulse 76, temperature 98.7 °F (37.1 °C), temperature source Temporal, weight 89 kg (196 lb 3.2 oz), SpO2 98%, not currently breastfeeding. Body mass index is 29.83 kg/m².        PHYSICAL EXAM:      General Appearance:   Alert, cooperative, no distress   HEENT:   Normocephalic, atraumatic, anicteric.     Lungs:   Respirations unlabored    Heart::   Regular rate and rhythm   Abdomen:   Soft, non-tender, non-distended; no masses, no organomegaly    Rectal:   NA   Extremities:  No edema    Skin:  No jaundice, rashes, or lesions      Lab Results:    Result Date  Result Date   WBC 6.59 1/24/2024 Iron 113 4/17/2023   Hgb 15.1 1/24/2024 TSAT No results in last 5 years No results in last 5 years   MCV 93 1/24/2024 Ferritin 15 4/17/2023   Plt 336 1/24/2024 Vit D 34.2 11/3/2020   Na 137 1/24/2024 HbA1c No results in last 5 years No results in last 5 years   K 4.0 1/24/2024 TSH 1.247 1/24/2024   Cr 0.65 1/24/2024 Ca 9.4 1/24/2024   AST 20 1/24/2024      ALT 9 1/24/2024      Alk phos 63 1/24/2024      Alb 4.7 1/24/2024      TBili 0.89 1/24/2024      DBili No results in last 5 years No results in last 5 years      GGT No results in last 5 years No results in last 5 years      INR 1.00 10/27/2020            Radiology Results:   US RUQ 10/2020: no cholelithiasis, 12 mm right hepatic lobe hyperechoic focus likely representing a hemangioma  CT A/P 12/16/2023: stomach and bowel unremarkable    Endoscopy Reports:   EGD 5/19/2023  FINDINGS:  Mild, patchy erythematous mucosa in the antrum  Performed forceps biopsies to rule out H. pylori in the body of the stomach, incisura and antrum  The esophagus and duodenum appeared normal. Z-line is 43 cm from the incisors.   A. Stomach, Biopsy:  - Antral- and oxyntic-type gastric mucosa with no specific pathologic change.  - Negative for H. pylori organisms on H&E stain.

## 2024-08-28 ENCOUNTER — OFFICE VISIT (OUTPATIENT)
Age: 34
End: 2024-08-28
Payer: COMMERCIAL

## 2024-08-28 VITALS
WEIGHT: 196.2 LBS | BODY MASS INDEX: 29.83 KG/M2 | TEMPERATURE: 98.7 F | SYSTOLIC BLOOD PRESSURE: 98 MMHG | DIASTOLIC BLOOD PRESSURE: 56 MMHG | HEART RATE: 76 BPM | OXYGEN SATURATION: 98 %

## 2024-08-28 DIAGNOSIS — R11.0 NAUSEA: ICD-10-CM

## 2024-08-28 DIAGNOSIS — Z80.0 FAMILY HISTORY OF COLON CANCER: Primary | ICD-10-CM

## 2024-08-28 PROCEDURE — 99213 OFFICE O/P EST LOW 20 MIN: CPT | Performed by: INTERNAL MEDICINE

## 2024-09-23 NOTE — PATIENT INSTRUCTIONS
Please take 2400 mg Ibuprofen daily as prescribed     Weight bearing as tolerated bilateral lower extremities     Follow up in 4 weeks
No

## 2024-11-05 DIAGNOSIS — Z80.0 FAMILY HISTORY OF COLON CANCER: ICD-10-CM

## 2024-11-14 ENCOUNTER — HOSPITAL ENCOUNTER (OUTPATIENT)
Dept: PERIOP | Facility: HOSPITAL | Age: 34
Setting detail: OUTPATIENT SURGERY
End: 2024-11-14
Attending: INTERNAL MEDICINE
Payer: COMMERCIAL

## 2024-11-14 ENCOUNTER — ANESTHESIA (OUTPATIENT)
Dept: PERIOP | Facility: HOSPITAL | Age: 34
End: 2024-11-14
Payer: COMMERCIAL

## 2024-11-14 ENCOUNTER — ANESTHESIA EVENT (OUTPATIENT)
Dept: PERIOP | Facility: HOSPITAL | Age: 34
End: 2024-11-14
Payer: COMMERCIAL

## 2024-11-14 VITALS
SYSTOLIC BLOOD PRESSURE: 121 MMHG | RESPIRATION RATE: 18 BRPM | BODY MASS INDEX: 29.7 KG/M2 | HEART RATE: 74 BPM | OXYGEN SATURATION: 99 % | TEMPERATURE: 98.9 F | DIASTOLIC BLOOD PRESSURE: 55 MMHG | WEIGHT: 196 LBS | HEIGHT: 68 IN

## 2024-11-14 DIAGNOSIS — Z80.0 FAMILY HISTORY OF COLON CANCER: ICD-10-CM

## 2024-11-14 LAB
EXT PREGNANCY TEST URINE: NEGATIVE
EXT. CONTROL: NORMAL

## 2024-11-14 PROCEDURE — 45385 COLONOSCOPY W/LESION REMOVAL: CPT | Performed by: INTERNAL MEDICINE

## 2024-11-14 PROCEDURE — 45380 COLONOSCOPY AND BIOPSY: CPT | Performed by: INTERNAL MEDICINE

## 2024-11-14 PROCEDURE — 81025 URINE PREGNANCY TEST: CPT | Performed by: STUDENT IN AN ORGANIZED HEALTH CARE EDUCATION/TRAINING PROGRAM

## 2024-11-14 PROCEDURE — 88305 TISSUE EXAM BY PATHOLOGIST: CPT | Performed by: PATHOLOGY

## 2024-11-14 RX ORDER — DIPHENHYDRAMINE HYDROCHLORIDE 50 MG/ML
INJECTION INTRAMUSCULAR; INTRAVENOUS AS NEEDED
Status: DISCONTINUED | OUTPATIENT
Start: 2024-11-14 | End: 2024-11-14

## 2024-11-14 RX ORDER — PROPOFOL 10 MG/ML
INJECTION, EMULSION INTRAVENOUS AS NEEDED
Status: DISCONTINUED | OUTPATIENT
Start: 2024-11-14 | End: 2024-11-14

## 2024-11-14 RX ORDER — LIDOCAINE HYDROCHLORIDE 10 MG/ML
INJECTION, SOLUTION EPIDURAL; INFILTRATION; INTRACAUDAL; PERINEURAL AS NEEDED
Status: DISCONTINUED | OUTPATIENT
Start: 2024-11-14 | End: 2024-11-14

## 2024-11-14 RX ORDER — SODIUM CHLORIDE, SODIUM LACTATE, POTASSIUM CHLORIDE, CALCIUM CHLORIDE 600; 310; 30; 20 MG/100ML; MG/100ML; MG/100ML; MG/100ML
125 INJECTION, SOLUTION INTRAVENOUS CONTINUOUS
Status: DISCONTINUED | OUTPATIENT
Start: 2024-11-14 | End: 2024-11-18 | Stop reason: HOSPADM

## 2024-11-14 RX ORDER — METOCLOPRAMIDE HYDROCHLORIDE 5 MG/ML
INJECTION INTRAMUSCULAR; INTRAVENOUS AS NEEDED
Status: DISCONTINUED | OUTPATIENT
Start: 2024-11-14 | End: 2024-11-14

## 2024-11-14 RX ORDER — SODIUM CHLORIDE, SODIUM LACTATE, POTASSIUM CHLORIDE, CALCIUM CHLORIDE 600; 310; 30; 20 MG/100ML; MG/100ML; MG/100ML; MG/100ML
INJECTION, SOLUTION INTRAVENOUS CONTINUOUS PRN
Status: DISCONTINUED | OUTPATIENT
Start: 2024-11-14 | End: 2024-11-14

## 2024-11-14 RX ORDER — SIMETHICONE 80 MG
80 TABLET,CHEWABLE ORAL ONCE
Status: COMPLETED | OUTPATIENT
Start: 2024-11-14 | End: 2024-11-14

## 2024-11-14 RX ORDER — ONDANSETRON 2 MG/ML
INJECTION INTRAMUSCULAR; INTRAVENOUS AS NEEDED
Status: DISCONTINUED | OUTPATIENT
Start: 2024-11-14 | End: 2024-11-14

## 2024-11-14 RX ORDER — PROPOFOL 10 MG/ML
INJECTION, EMULSION INTRAVENOUS CONTINUOUS PRN
Status: DISCONTINUED | OUTPATIENT
Start: 2024-11-14 | End: 2024-11-14

## 2024-11-14 RX ADMIN — PROPOFOL 100 MG: 10 INJECTION, EMULSION INTRAVENOUS at 09:47

## 2024-11-14 RX ADMIN — PROPOFOL 50 MG: 10 INJECTION, EMULSION INTRAVENOUS at 09:49

## 2024-11-14 RX ADMIN — ONDANSETRON 4 MG: 2 INJECTION INTRAMUSCULAR; INTRAVENOUS at 09:43

## 2024-11-14 RX ADMIN — SODIUM CHLORIDE, SODIUM LACTATE, POTASSIUM CHLORIDE, AND CALCIUM CHLORIDE: .6; .31; .03; .02 INJECTION, SOLUTION INTRAVENOUS at 09:43

## 2024-11-14 RX ADMIN — DIPHENHYDRAMINE HYDROCHLORIDE 25 MG: 50 INJECTION, SOLUTION INTRAMUSCULAR; INTRAVENOUS at 09:43

## 2024-11-14 RX ADMIN — PROPOFOL 100 MG: 10 INJECTION, EMULSION INTRAVENOUS at 09:48

## 2024-11-14 RX ADMIN — SIMETHICONE 80 MG: 80 TABLET, CHEWABLE ORAL at 11:13

## 2024-11-14 RX ADMIN — LIDOCAINE HYDROCHLORIDE 50 MG: 10 INJECTION, SOLUTION EPIDURAL; INFILTRATION; INTRACAUDAL; PERINEURAL at 09:47

## 2024-11-14 RX ADMIN — METOCLOPRAMIDE 10 MG: 5 INJECTION, SOLUTION INTRAMUSCULAR; INTRAVENOUS at 09:43

## 2024-11-14 RX ADMIN — PROPOFOL 120 MCG/KG/MIN: 10 INJECTION, EMULSION INTRAVENOUS at 09:47

## 2024-11-14 RX ADMIN — SODIUM CHLORIDE, SODIUM LACTATE, POTASSIUM CHLORIDE, AND CALCIUM CHLORIDE 125 ML/HR: .6; .31; .03; .02 INJECTION, SOLUTION INTRAVENOUS at 08:56

## 2024-11-14 NOTE — ANESTHESIA POSTPROCEDURE EVALUATION
Post-Op Assessment Note    CV Status:  Stable  Pain Score: 0    Pain management: adequate       Mental Status:  Sleepy   Hydration Status:  Euvolemic   PONV Controlled:  None   Airway Patency:  Patent     Post Op Vitals Reviewed: Yes    No anethesia notable event occurred.    Staff: CRNA           Last Filed PACU Vitals:  Vitals Value Taken Time   Temp     Pulse 76    /69    Resp 16    SpO2 97        Modified Arnav:  Activity: 2 (11/14/2024  8:47 AM)  Respiration: 2 (11/14/2024  8:47 AM)  Circulation: 2 (11/14/2024  8:47 AM)  Consciousness: 2 (11/14/2024  8:47 AM)  Oxygen Saturation: 2 (11/14/2024  8:47 AM)  Modified Arnav Score: 10 (11/14/2024  8:47 AM)

## 2024-11-14 NOTE — H&P
"History and Physical -  [facility]  Evette Mei 34 y.o. female MRN: [id number]                  HPI: Evette Mei is a 34 y.o. year old female who presents for crc screening given Fhx of colon cancer and polyps.       REVIEW OF SYSTEMS: Per the HPI, and otherwise unremarkable.    Historical Information   Past Medical History:   Diagnosis Date    Acid reflux     silent refulx    Anorexia     Bulimia     Hemorrhoids     MRSA infection 2014    in R underarm     Past Surgical History:   Procedure Laterality Date    NO PAST SURGERIES       Social History   Social History     Substance and Sexual Activity   Alcohol Use Yes    Alcohol/week: 5.0 standard drinks of alcohol    Types: 5 Glasses of wine per week    Comment: cant drink because the reflux. 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      Social History     Substance and Sexual Activity   Drug Use No     Social History     Tobacco Use   Smoking Status Never    Passive exposure: Never   Smokeless Tobacco Never     Family History   Problem Relation Age of Onset    Deep vein thrombosis Mother     Gallbladder disease Mother     Hypertension Father     Coronary artery disease Father     Scoliosis Sister     ADD / ADHD Sister     Gallbladder disease Sister     Uterine cancer Maternal Grandmother     Cervical cancer Paternal Grandfather     Breast cancer Other 60    Ovarian cancer Neg Hx        Meds/Allergies       Current Outpatient Medications:     PARAGARD INTRAUTERINE COPPER IU    polyethylene glycol (GOLYTELY) 4000 mL solution    traZODone (DESYREL) 50 mg tablet    Current Facility-Administered Medications:     lactated ringers infusion, 125 mL/hr, Intravenous, Continuous, 125 mL/hr at 11/14/24 0856    Allergies   Allergen Reactions    Sulfa Antibiotics Lip Swelling       Objective     /61   Pulse 72   Temp 98.9 °F (37.2 °C) (Temporal)   Resp 18   Ht 5' 8\" (1.727 m)   Wt 88.9 kg (196 lb)   LMP 11/01/2024 " (Approximate)   SpO2 97%   BMI 29.80 kg/m²       PHYSICAL EXAM    Gen: NAD  Head: NCAT  CV: RRR  CHEST: not in respiratory distress  ABD: soft, NT/ND  EXT: no edema      ASSESSMENT/PLAN:  This is a 34 y.o. year old female here for colonoscopy, and she is stable and optimized for her procedure.

## 2024-11-14 NOTE — ANESTHESIA PREPROCEDURE EVALUATION
Procedure:  COLONOSCOPY    Relevant Problems   NEURO/PSYCH   (+) PTSD (post-traumatic stress disorder)        Physical Exam    Airway    Mallampati score: II         Dental       Cardiovascular      Pulmonary      Other Findings  post-pubertal.      Anesthesia Plan  ASA Score- 1     Anesthesia Type- IV sedation with anesthesia with ASA Monitors.         Additional Monitors:     Airway Plan:            Plan Factors-    Chart reviewed.                      Induction- intravenous.    Postoperative Plan-         Informed Consent- Anesthetic plan and risks discussed with patient.  I personally reviewed this patient with the CRNA. Discussed and agreed on the Anesthesia Plan with the CRNA..

## 2024-11-20 ENCOUNTER — RESULTS FOLLOW-UP (OUTPATIENT)
Dept: GASTROENTEROLOGY | Facility: MEDICAL CENTER | Age: 34
End: 2024-11-20

## 2024-11-20 PROCEDURE — 88305 TISSUE EXAM BY PATHOLOGIST: CPT | Performed by: PATHOLOGY

## 2024-11-25 ENCOUNTER — TELEPHONE (OUTPATIENT)
Age: 34
End: 2024-11-25

## 2024-11-25 NOTE — TELEPHONE ENCOUNTER
Received call from pt stating that she is establishing care with Sne[facility] HAMMOND on 12/3/24.  She is asking if she can have lab work ordered before her visit.  She is also asking for a calcium scoring test to be ordered as she states she is following in her Dad's and Uncle's foot steps having had a recent colo and found several pre-cancerous polyps similar to her family members.  Please review and advise.

## 2024-11-26 DIAGNOSIS — E78.00 HYPERCHOLESTEROLEMIA: Primary | ICD-10-CM

## 2024-11-26 DIAGNOSIS — Z13.29 SCREENING FOR THYROID DISORDER: ICD-10-CM

## 2024-11-29 ENCOUNTER — OFFICE VISIT (OUTPATIENT)
Age: 34
End: 2024-11-29
Payer: COMMERCIAL

## 2024-11-29 VITALS
OXYGEN SATURATION: 97 % | RESPIRATION RATE: 18 BRPM | SYSTOLIC BLOOD PRESSURE: 116 MMHG | TEMPERATURE: 97.2 F | WEIGHT: 197 LBS | HEART RATE: 91 BPM | DIASTOLIC BLOOD PRESSURE: 60 MMHG | HEIGHT: 68 IN | BODY MASS INDEX: 29.86 KG/M2

## 2024-11-29 DIAGNOSIS — Z83.719 FAMILY HISTORY OF COLONIC POLYPS: ICD-10-CM

## 2024-11-29 DIAGNOSIS — R11.0 NAUSEA: ICD-10-CM

## 2024-11-29 DIAGNOSIS — D12.6 TUBULAR ADENOMA OF COLON: Primary | ICD-10-CM

## 2024-11-29 PROCEDURE — 99213 OFFICE O/P EST LOW 20 MIN: CPT | Performed by: INTERNAL MEDICINE

## 2024-11-29 RX ORDER — LANOLIN ALCOHOL/MO/W.PET/CERES
CREAM (GRAM) TOPICAL DAILY
COMMUNITY

## 2024-11-29 NOTE — PROGRESS NOTES
Name: Evette Mei      : 1990      MRN: [id number]  Encounter Provider: Anastacia Shoemaker MD  Encounter Date: 2024   Encounter department: [facility]JESH AMARAL  :  Assessment & Plan  Tubular adenoma of colon  Colonoscopy 2024 (for Fhx of colon cancer/polyps, BBPS 9): lipomatous ICV, 6x 2-3 mm TAs in cecum, ascending colon and rectum), hemorrhoids and skin tag.   - repeat colonoscopy in 3 yrs for surveillance (~2027); given Fhx of colon cancer (SDR) and colonic polyps (2 FDRs), she would benefit from shortened interval for screening ie q5y if normal colonoscopy       Family history of colonic polyps  See above       Nausea  Daytime nausea resolved. Intermittent bubbling sensation in her neck. No regurgitation/heartburn.   EGD 2023: mild, patchy erythematous mucosa in the antrum s/p bx negative for H. Pylori  Her symptoms are not classic GERD symptoms. Symptoms are mild. After discussion with the patient, elected to monitor her symptoms and pursue further workup such as manometry/pH impedance test if her symptoms are more persistent and bothersome. Continue with lifestyle modifications       RTC in 3 yrs or sooner      History of Present Illness     HPI  Evette Mei is a 34 y.o. female LPR, FHx of colon cancer in PGF and colonic polyps in FDRs here after colonoscopy.  History obtained from: patient    Last seen on 2024 s/p colonoscopy for screening given high risk  Colonoscopy 2024 (for Fhx of colon cancer/polyps, BBPS 9): lipomatous ICV, 6x 2-3 mm TAs in cecum, ascending colon and rectum), hemorrhoids and skin tag.     Patient tried acid watcher diet with resolution of daytime nausea. Intermittent bubbling sensation in there neck. No dysphagia or odynophagia. No regurgitation.       Fhx: PGF with colon cancer (passed away from 56), paternal uncle, father and sister with colonic polyps, MGM with ovarian cancer, mother side of the family  "with breast cancer       Review of Systems  Current Outpatient Medications on File Prior to Visit   Medication Sig Dispense Refill    ASHWAGANDHA PO Take by mouth      PARAGARD INTRAUTERINE COPPER IU by Intrauterine route      traZODone (DESYREL) 50 mg tablet Take 1 tablet (50 mg total) by mouth daily at bedtime as needed for sleep 30 tablet 0    vitamin B-12 (VITAMIN B-12) 1,000 mcg tablet Take by mouth daily      [DISCONTINUED] polyethylene glycol (GOLYTELY) 4000 mL solution Take 4,000 mL by mouth once for 1 dose (Patient not taking: Reported on 11/29/2024) 4000 mL 0     No current facility-administered medications on file prior to visit.         Objective   /60   Pulse 91   Temp (!) 97.2 °F (36.2 °C)   Resp 18   Ht 5' 8\" (1.727 m)   Wt 89.4 kg (197 lb)   LMP 11/01/2024 (Approximate)   SpO2 97%   BMI 29.95 kg/m²      Physical Exam  Vitals reviewed.   Constitutional:       Appearance: Normal appearance.   HENT:      Head: Normocephalic and atraumatic.   Cardiovascular:      Rate and Rhythm: Normal rate.   Pulmonary:      Effort: Pulmonary effort is normal. No respiratory distress.   Abdominal:      General: There is no distension.      Palpations: Abdomen is soft.      Tenderness: There is no abdominal tenderness.   Musculoskeletal:         General: No swelling.   Skin:     General: Skin is warm and dry.   Neurological:      General: No focal deficit present.      Mental Status: She is alert.           "

## 2024-12-03 ENCOUNTER — OFFICE VISIT (OUTPATIENT)
Dept: FAMILY MEDICINE CLINIC | Facility: CLINIC | Age: 34
End: 2024-12-03
Payer: COMMERCIAL

## 2024-12-03 VITALS
DIASTOLIC BLOOD PRESSURE: 72 MMHG | SYSTOLIC BLOOD PRESSURE: 122 MMHG | HEIGHT: 68 IN | BODY MASS INDEX: 29.77 KG/M2 | TEMPERATURE: 96.9 F | OXYGEN SATURATION: 97 % | HEART RATE: 97 BPM | WEIGHT: 196.4 LBS

## 2024-12-03 DIAGNOSIS — Z00.00 ANNUAL PHYSICAL EXAM: Primary | ICD-10-CM

## 2024-12-03 DIAGNOSIS — Z23 ENCOUNTER FOR IMMUNIZATION: ICD-10-CM

## 2024-12-03 DIAGNOSIS — Z13.6 SCREENING FOR CARDIOVASCULAR CONDITION: ICD-10-CM

## 2024-12-03 DIAGNOSIS — R63.5 WEIGHT GAIN: ICD-10-CM

## 2024-12-03 DIAGNOSIS — G47.09 OTHER INSOMNIA: ICD-10-CM

## 2024-12-03 PROCEDURE — 99214 OFFICE O/P EST MOD 30 MIN: CPT | Performed by: NURSE PRACTITIONER

## 2024-12-03 PROCEDURE — 99395 PREV VISIT EST AGE 18-39: CPT | Performed by: NURSE PRACTITIONER

## 2024-12-03 RX ORDER — TRAZODONE HYDROCHLORIDE 50 MG/1
50 TABLET, FILM COATED ORAL
Qty: 30 TABLET | Refills: 0 | Status: SHIPPED | OUTPATIENT
Start: 2024-12-03

## 2024-12-03 NOTE — PROGRESS NOTES
Adult Annual Physical  Name: Evette Mei      : 1990      MRN: [id number]  Encounter Provider: STEPHANY Pascual  Encounter Date: 12/3/2024   Encounter department: [facility]    Assessment & Plan  Annual physical exam         Encounter for immunization    Orders:    influenza vaccine preservative-free 0.5 mL IM (Fluzone, Afluria, Fluarix, Flulaval)    Weight gain  Patient states that over the last 2 years she has gained approximately 40 pounds.  She states that since she had a traumatic event 2 years ago she has had significant changes in how she is feeling.  She is feeling more tired having sleep difficulties, having ringing in the left ear.  She states that she is eating very healthy, home-cooked meals adequate fiber and protein.  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  She is requesting a cortisol level to be drawn.  Orders:    Cortisol; Future    Other insomnia  States that she ran out of the trazodone a while back.  Is having very difficult time with her sleeping and is only sleeping approximately 3 hours a night.  States that when she was taking the trazodone she was at least able to get a little bit more restful sleep.  Will refill possible side effects reviewed signs and symptoms of when to return reviewed with patient and she verbalized understanding.  Orders:    traZODone (DESYREL) 50 mg tablet; Take 1 tablet (50 mg total) by mouth daily at bedtime as needed for sleep    Screening for cardiovascular condition  Requesting coronary calcium score as her father and her uncle both have heart disease but not an early age.  Father has had bypass.  Patient has labs to be drawn.  Patient understands that she does not meet the criteria calcium score testing at this time reassess she is asymptomatic.       Immunizations and preventive care screenings were discussed with patient today. Appropriate education was  "printed on patient's after visit summary.    Counseling:  Dental Health: discussed importance of regular tooth brushing, flossing, and dental visits.  Exercise: the importance of regular exercise/physical activity was discussed. Recommend exercise 3-5 times per week for at least 30 minutes.          History of Present Illness     Adult Annual Physical:  Patient presents for annual physical.     Diet and Physical Activity:    - Exercise: walking. walks every day and active at work, on [employer]    Depression Screening:  - PHQ-2 Score: 0    General Health:  - Sleep: sleeps poorly and 4-6 hours of sleep on average.  - Hearing: normal hearing bilateral ears and tinnitus.  - Vision: wears glasses and goes for regular eye exams.  - Dental: regular dental visits and brushes teeth twice daily.    /GYN Health:    - Last menstrual cycle: 12/3/2024.   - Contraception: IUD placement.      Review of Systems   Constitutional:  Positive for fatigue and unexpected weight change. Negative for chills and fever.   HENT:  Positive for tinnitus. Negative for ear pain and sore throat.    Eyes:  Negative for pain and visual disturbance.   Respiratory:  Negative for cough and shortness of breath.    Cardiovascular:  Negative for chest pain and palpitations.   Gastrointestinal:  Negative for abdominal pain and vomiting.   Genitourinary:  Negative for dysuria and hematuria.   Musculoskeletal:  Negative for arthralgias and back pain.   Skin:  Negative for color change and rash.   Neurological:  Negative for seizures, syncope and headaches.   Psychiatric/Behavioral:  Positive for sleep disturbance. The patient is not nervous/anxious.    All other systems reviewed and are negative.        Objective   /72   Pulse 97   Temp (!) 96.9 °F (36.1 °C) (Tympanic)   Ht 5' 8\" (1.727 m)   Wt 89.1 kg (196 lb 6.4 oz)   LMP 12/03/2024 (Approximate)   SpO2 97%   BMI 29.86 kg/m²     Physical Exam  Vitals and nursing note reviewed. "   Constitutional:       General: She is not in acute distress.     Appearance: Normal appearance. She is well-developed and normal weight.   HENT:      Head: Normocephalic and atraumatic.      Right Ear: Tympanic membrane, ear canal and external ear normal. There is no impacted cerumen.      Left Ear: Tympanic membrane, ear canal and external ear normal. There is no impacted cerumen.      Nose: Nose normal. No congestion or rhinorrhea.      Mouth/Throat:      Mouth: Mucous membranes are moist.      Pharynx: Oropharynx is clear. No oropharyngeal exudate or posterior oropharyngeal erythema.   Eyes:      General: No scleral icterus.        Right eye: No discharge.         Left eye: No discharge.      Extraocular Movements: Extraocular movements intact.      Conjunctiva/sclera: Conjunctivae normal.      Pupils: Pupils are equal, round, and reactive to light.   Cardiovascular:      Rate and Rhythm: Normal rate and regular rhythm.      Pulses: Normal pulses.      Heart sounds: Normal heart sounds. No murmur heard.  Pulmonary:      Effort: Pulmonary effort is normal. No respiratory distress.      Breath sounds: Normal breath sounds. No wheezing or rales.   Abdominal:      General: Abdomen is flat. Bowel sounds are normal. There is no distension.      Palpations: Abdomen is soft.      Tenderness: There is no abdominal tenderness. There is no guarding.   Musculoskeletal:      Cervical back: Normal range of motion and neck supple. No rigidity or tenderness.   Lymphadenopathy:      Cervical: No cervical adenopathy.   Skin:     General: Skin is warm and dry.      Capillary Refill: Capillary refill takes less than 2 seconds.   Neurological:      General: No focal deficit present.      Mental Status: She is alert and oriented to person, place, and time. Mental status is at baseline.      Motor: No weakness.      Gait: Gait normal.   Psychiatric:         Mood and Affect: Mood normal.         Behavior: Behavior normal.          Thought Content: Thought content normal.         Judgment: Judgment normal.

## 2024-12-03 NOTE — ASSESSMENT & PLAN NOTE
States that she ran out of the trazodone a while back.  Is having very difficult time with her sleeping and is only sleeping approximately 3 hours a night.  States that when she was taking the trazodone she was at least able to get a little bit more restful sleep.  Will refill possible side effects reviewed signs and symptoms of when to return reviewed with patient and she verbalized understanding.  Orders:    traZODone (DESYREL) 50 mg tablet; Take 1 tablet (50 mg total) by mouth daily at bedtime as needed for sleep

## 2024-12-03 NOTE — ASSESSMENT & PLAN NOTE
Patient states that over the last 2 years she has gained approximately 40 pounds.  She states that since she had a traumatic event 2 years ago she has had significant changes in how she is feeling.  She is feeling more tired having sleep difficulties, having ringing in the left ear.  She states that she is eating very healthy, home-cooked meals adequate fiber and protein.  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  She is requesting a cortisol level to be drawn.  Orders:    Cortisol; Future

## 2024-12-13 ENCOUNTER — TELEPHONE (OUTPATIENT)
Age: 34
End: 2024-12-13

## 2024-12-13 ENCOUNTER — OFFICE VISIT (OUTPATIENT)
Dept: FAMILY MEDICINE CLINIC | Facility: CLINIC | Age: 34
End: 2024-12-13
Payer: COMMERCIAL

## 2024-12-13 VITALS
BODY MASS INDEX: 29.7 KG/M2 | OXYGEN SATURATION: 100 % | RESPIRATION RATE: 18 BRPM | HEIGHT: 68 IN | DIASTOLIC BLOOD PRESSURE: 62 MMHG | SYSTOLIC BLOOD PRESSURE: 102 MMHG | TEMPERATURE: 98.3 F | HEART RATE: 70 BPM | WEIGHT: 196 LBS

## 2024-12-13 DIAGNOSIS — R35.0 URINARY FREQUENCY: Primary | ICD-10-CM

## 2024-12-13 LAB
SL AMB  POCT GLUCOSE, UA: NEGATIVE
SL AMB LEUKOCYTE ESTERASE,UA: ABNORMAL
SL AMB POCT BILIRUBIN,UA: NEGATIVE
SL AMB POCT BLOOD,UA: ABNORMAL
SL AMB POCT CLARITY,UA: CLEAR
SL AMB POCT COLOR,UA: YELLOW
SL AMB POCT KETONES,UA: NEGATIVE
SL AMB POCT NITRITE,UA: NEGATIVE
SL AMB POCT PH,UA: 7.5
SL AMB POCT SPECIFIC GRAVITY,UA: 1.02
SL AMB POCT URINE PROTEIN: NEGATIVE
SL AMB POCT UROBILINOGEN: ABNORMAL

## 2024-12-13 PROCEDURE — 99213 OFFICE O/P EST LOW 20 MIN: CPT

## 2024-12-13 PROCEDURE — 87086 URINE CULTURE/COLONY COUNT: CPT

## 2024-12-13 PROCEDURE — 81002 URINALYSIS NONAUTO W/O SCOPE: CPT

## 2024-12-13 RX ORDER — NITROFURANTOIN 25; 75 MG/1; MG/1
100 CAPSULE ORAL 2 TIMES DAILY
Qty: 10 CAPSULE | Refills: 0 | Status: SHIPPED | OUTPATIENT
Start: 2024-12-13 | End: 2024-12-18

## 2024-12-13 NOTE — PROGRESS NOTES
Name: Evette Mei      : 1990      MRN: [id number]  Encounter Provider: STEPHANY Lantigua  Encounter Date: 2024   Encounter department: [facility]  :  Assessment & Plan  Urinary frequency    Reports urinary urgency & frequency x 2 days.  Denies dysuria. Denies back pain, fevers or chills.  No complaints of vaginal discharge or itching.  No concern for STI at this time.  LMP    UA in office with trace leuks, trace blood.  Start Macrobid as directed. F/u on UXC.    Orders:    POCT urine dip    nitrofurantoin (MACROBID) 100 mg capsule; Take 1 capsule (100 mg total) by mouth 2 (two) times a day for 5 days    Urine culture; Future          Depression Screening and Follow-up Plan: Patient was screened for depression during today's encounter. They screened negative with a PHQ-2 score of 0.      History of Present Illness       Review of Systems   Constitutional:  Negative for chills, fatigue and fever.   HENT:  Negative for congestion, ear pain, facial swelling, hearing loss, rhinorrhea, sinus pressure, sneezing, sore throat and trouble swallowing.    Eyes:  Negative for pain, redness and visual disturbance.   Respiratory:  Negative for cough, chest tightness, shortness of breath and wheezing.    Cardiovascular:  Negative for chest pain and palpitations.   Gastrointestinal:  Negative for abdominal pain, diarrhea, nausea and vomiting.   Genitourinary:  Positive for frequency and urgency. Negative for dysuria, flank pain, hematuria and pelvic pain.   Musculoskeletal:  Negative for arthralgias, back pain and myalgias.   Skin:  Negative for color change and rash.   Neurological:  Negative for dizziness, seizures, syncope, weakness, light-headedness and headaches.   Psychiatric/Behavioral:  Negative for confusion, hallucinations and sleep disturbance. The patient is not nervous/anxious.    All other systems reviewed and are negative.      Objective   /62 (Patient  "Position: Sitting, Cuff Size: Large)   Pulse 70   Temp 98.3 °F (36.8 °C) (Tympanic)   Resp 18   Ht 5' 8\" (1.727 m)   Wt 88.9 kg (196 lb)   LMP 12/03/2024 (Approximate)   SpO2 100%   BMI 29.80 kg/m²      Physical Exam  Vitals and nursing note reviewed.   Constitutional:       General: She is not in acute distress.     Appearance: She is well-developed.   HENT:      Head: Normocephalic and atraumatic.      Right Ear: Hearing and tympanic membrane normal.      Left Ear: Hearing and tympanic membrane normal.      Nose: Nose normal.      Mouth/Throat:      Mouth: Mucous membranes are moist.      Dentition: Normal dentition.      Tongue: No lesions.      Pharynx: Oropharynx is clear. Uvula midline. No oropharyngeal exudate.      Tonsils: No tonsillar exudate.   Eyes:      Extraocular Movements: Extraocular movements intact.      Conjunctiva/sclera: Conjunctivae normal.   Neck:      Vascular: No carotid bruit or JVD.   Cardiovascular:      Rate and Rhythm: Normal rate and regular rhythm.      Heart sounds: S1 normal and S2 normal. No murmur heard.  Pulmonary:      Effort: Pulmonary effort is normal. No tachypnea or respiratory distress.      Breath sounds: Normal breath sounds and air entry. No decreased breath sounds.   Chest:      Chest wall: No deformity or tenderness.   Abdominal:      General: Abdomen is flat. Bowel sounds are normal. There is no distension.      Palpations: Abdomen is soft.      Tenderness: There is no abdominal tenderness. There is no right CVA tenderness, left CVA tenderness or guarding.   Musculoskeletal:         General: No swelling.      Cervical back: Full passive range of motion without pain and neck supple.      Right lower leg: No edema.      Left lower leg: No edema.   Lymphadenopathy:      Cervical: No cervical adenopathy.   Skin:     General: Skin is warm and dry.      Capillary Refill: Capillary refill takes less than 2 seconds.      Findings: No rash.   Neurological:      Mental " Status: She is alert and oriented to person, place, and time.      Cranial Nerves: Cranial nerves 2-12 are intact.      Sensory: Sensation is intact.      Motor: Motor function is intact.      Coordination: Coordination is intact.      Gait: Gait is intact.   Psychiatric:         Mood and Affect: Mood normal.         Behavior: Behavior is cooperative.

## 2024-12-14 LAB — BACTERIA UR CULT: NORMAL

## 2024-12-20 ENCOUNTER — OFFICE VISIT (OUTPATIENT)
Dept: FAMILY MEDICINE CLINIC | Facility: CLINIC | Age: 34
End: 2024-12-20
Payer: COMMERCIAL

## 2024-12-20 VITALS
DIASTOLIC BLOOD PRESSURE: 60 MMHG | WEIGHT: 190 LBS | OXYGEN SATURATION: 98 % | BODY MASS INDEX: 28.79 KG/M2 | HEART RATE: 76 BPM | SYSTOLIC BLOOD PRESSURE: 112 MMHG | HEIGHT: 68 IN | TEMPERATURE: 98.3 F

## 2024-12-20 DIAGNOSIS — J18.9 PNEUMONIA DUE TO INFECTIOUS ORGANISM, UNSPECIFIED LATERALITY, UNSPECIFIED PART OF LUNG: Primary | ICD-10-CM

## 2024-12-20 PROCEDURE — 99213 OFFICE O/P EST LOW 20 MIN: CPT | Performed by: FAMILY MEDICINE

## 2024-12-20 RX ORDER — AZITHROMYCIN 250 MG/1
TABLET, FILM COATED ORAL
Qty: 6 TABLET | Refills: 0 | Status: SHIPPED | OUTPATIENT
Start: 2024-12-20 | End: 2024-12-25

## 2024-12-20 RX ORDER — METHYLPREDNISOLONE 4 MG/1
TABLET ORAL
Qty: 21 EACH | Refills: 0 | Status: SHIPPED | OUTPATIENT
Start: 2024-12-20

## 2024-12-20 NOTE — PROGRESS NOTES
"Assessment/Plan:    No problem-specific Assessment & Plan notes found for this encounter.       Diagnoses and all orders for this visit:    Pneumonia due to infectious organism, unspecified laterality, unspecified part of lung  -     azithromycin (Zithromax) 250 mg tablet; Take 2 tablets (500 mg total) by mouth daily for 1 day, THEN 1 tablet (250 mg total) daily for 4 days.  -     methylPREDNISolone 4 MG tablet therapy pack; Use as directed on package          PHQ-2/9 Depression Screening    Little interest or pleasure in doing things: 0 - not at all  Feeling down, depressed, or hopeless: 0 - not at all  PHQ-2 Score: 0  PHQ-2 Interpretation: Negative depression screen            Subjective:      Patient ID: Evette Mei is a 34 y.o. female.    Cough  This is a new problem. The current episode started 1 to 4 weeks ago. The problem has been unchanged. The cough is Productive of purulent sputum. Associated symptoms include nasal congestion, rhinorrhea and shortness of breath. Pertinent negatives include no chills, fever or wheezing. Treatments tried: steam, natural remedies. The treatment provided no relief.     The following portions of the patient's history were reviewed and updated as appropriate: allergies, current medications, past family history, past medical history, past social history, past surgical history, and problem list.    Review of Systems   Constitutional:  Positive for diaphoresis. Negative for chills and fever.   HENT:  Positive for rhinorrhea.    Respiratory:  Positive for cough and shortness of breath. Negative for wheezing.          Objective:    /60   Pulse 76   Temp 98.3 °F (36.8 °C)   Ht 5' 8\" (1.727 m)   Wt 86.2 kg (190 lb)   LMP 12/03/2024 (Approximate)   SpO2 98%   BMI 28.89 kg/m²      Physical Exam  Vitals and nursing note reviewed.   Constitutional:       General: She is not in acute distress.     Appearance: Normal appearance. She is not ill-appearing or diaphoretic. "   HENT:      Head: Normocephalic and atraumatic.      Right Ear: Tympanic membrane, ear canal and external ear normal. There is no impacted cerumen.      Left Ear: Tympanic membrane, ear canal and external ear normal. There is no impacted cerumen.      Nose: Congestion and rhinorrhea present.   Eyes:      Conjunctiva/sclera: Conjunctivae normal.   Cardiovascular:      Rate and Rhythm: Normal rate and regular rhythm.      Heart sounds: Normal heart sounds. No murmur heard.  Pulmonary:      Effort: Pulmonary effort is normal. No respiratory distress.      Breath sounds: Rhonchi present. No wheezing or rales.   Musculoskeletal:      Cervical back: Normal range of motion and neck supple. No rigidity.      Right lower leg: No edema.      Left lower leg: No edema.   Lymphadenopathy:      Cervical: No cervical adenopathy.   Neurological:      Mental Status: She is alert and oriented to person, place, and time.   Psychiatric:         Mood and Affect: Mood normal.         Behavior: Behavior normal.         Thought Content: Thought content normal.         Judgment: Judgment normal.

## 2024-12-27 DIAGNOSIS — G47.09 OTHER INSOMNIA: ICD-10-CM

## 2024-12-27 RX ORDER — TRAZODONE HYDROCHLORIDE 50 MG/1
50 TABLET, FILM COATED ORAL
Qty: 30 TABLET | Refills: 0 | Status: SHIPPED | OUTPATIENT
Start: 2024-12-27

## 2025-02-17 DIAGNOSIS — G47.09 OTHER INSOMNIA: ICD-10-CM

## 2025-02-17 RX ORDER — TRAZODONE HYDROCHLORIDE 100 MG/1
100 TABLET ORAL
Qty: 30 TABLET | Refills: 1 | Status: SHIPPED | OUTPATIENT
Start: 2025-02-17

## 2025-03-19 ENCOUNTER — OFFICE VISIT (OUTPATIENT)
Dept: FAMILY MEDICINE CLINIC | Facility: CLINIC | Age: 35
End: 2025-03-19
Payer: COMMERCIAL

## 2025-03-19 VITALS
TEMPERATURE: 98.2 F | SYSTOLIC BLOOD PRESSURE: 118 MMHG | DIASTOLIC BLOOD PRESSURE: 64 MMHG | BODY MASS INDEX: 30.62 KG/M2 | HEIGHT: 68 IN | OXYGEN SATURATION: 98 % | WEIGHT: 202 LBS | HEART RATE: 79 BPM

## 2025-03-19 DIAGNOSIS — F51.01 PRIMARY INSOMNIA: Primary | ICD-10-CM

## 2025-03-19 PROCEDURE — 99214 OFFICE O/P EST MOD 30 MIN: CPT | Performed by: NURSE PRACTITIONER

## 2025-03-19 RX ORDER — MUPIROCIN 20 MG/G
OINTMENT TOPICAL
COMMUNITY
Start: 2025-03-11

## 2025-03-19 RX ORDER — TEMAZEPAM 7.5 MG/1
7.5 CAPSULE ORAL
Qty: 30 CAPSULE | Refills: 0 | Status: SHIPPED | OUTPATIENT
Start: 2025-03-19

## 2025-03-19 NOTE — PROGRESS NOTES
"Name: Evette Mei      : 1990      MRN: [id number]  Encounter Provider: STEPHANY Pascual  Encounter Date: 3/19/2025   Encounter department: [facility]  :  Assessment & Plan  Primary insomnia  Presents for follow up on Trazodone.  still not getting a lot of sleep. Takes medication around 8-9pm and falls asleep around 1:45am and wakes up around 4:45am but feels like she is \"not getting into REM sleep.  She does not feel like it helps enough and she is very tired through out the day.  She has tired OTC melatonin without relief. She does not want to try an benadryl products. Does not want to try medications like Ambien or lunesta due to possible side effects.  She lives a;one and she is worried she would do things during the night and not remember. She does not have a person history of drug or alcohol abuse. She does feel some component of anxiety at night when she is trying to sleep. Will trial temazepam. Possible dependence and abuse along with other side effects reviewed and she verbalized understanding.     Orders:  •  Ambulatory Referral to Sleep Medicine; Future  •  temazepam (RESTORIL) 7.5 mg capsule; Take 1 capsule (7.5 mg total) by mouth daily at bedtime as needed for sleep          Depression Screening and Follow-up Plan: Patient was screened for depression during today's encounter. They screened negative with a PHQ-2 score of 2.        History of Present Illness   HPI  Review of Systems   Psychiatric/Behavioral:  Positive for sleep disturbance. Negative for agitation, decreased concentration, dysphoric mood, self-injury and suicidal ideas. The patient is nervous/anxious.        Objective   /64   Pulse 79   Temp 98.2 °F (36.8 °C)   Ht 5' 8\" (1.727 m)   Wt 91.6 kg (202 lb)   SpO2 98%   BMI 30.71 kg/m²      Physical Exam  Vitals and nursing note reviewed.   Cardiovascular:      Pulses: Normal pulses.      Heart sounds: Normal heart sounds. "   Pulmonary:      Effort: Pulmonary effort is normal.      Breath sounds: Normal breath sounds.   Neurological:      General: No focal deficit present.      Mental Status: She is oriented to person, place, and time. Mental status is at baseline.   Psychiatric:         Mood and Affect: Mood normal.         Behavior: Behavior normal.         Thought Content: Thought content normal.         Judgment: Judgment normal.

## 2025-03-19 NOTE — ASSESSMENT & PLAN NOTE
"Presents for follow up on Trazodone.  still not getting a lot of sleep. Takes medication around 8-9pm and falls asleep around 1:45am and wakes up around 4:45am but feels like she is \"not getting into REM sleep.  She does not feel like it helps enough and she is very tired through out the day.  She has tired OTC melatonin without relief. She does not want to try an benadryl products. Does not want to try medications like Ambien or lunesta due to possible side effects.  She lives a;one and she is worried she would do things during the night and not remember. She does not have a person history of drug or alcohol abuse. She does feel some component of anxiety at night when she is trying to sleep. Will trial temazepam. Possible dependence and abuse along with other side effects reviewed and she verbalized understanding.     Orders:  •  Ambulatory Referral to Sleep Medicine; Future  •  temazepam (RESTORIL) 7.5 mg capsule; Take 1 capsule (7.5 mg total) by mouth daily at bedtime as needed for sleep  "

## 2025-03-20 ENCOUNTER — TELEPHONE (OUTPATIENT)
Age: 35
End: 2025-03-20

## 2025-03-20 NOTE — TELEPHONE ENCOUNTER
PA for temazepam 7.5 mg SUBMITTED to [employer]     via    [x]CMM-KEY: [id number]  []Surescripts-Case ID #   []Availity-Auth ID # NDC #   []Faxed to plan   []Other website   []Phone call Case ID #     []PA sent as URGENT    All office notes, labs and other pertaining documents and studies sent. Clinical questions answered. Awaiting determination from insurance company.     Turnaround time for your insurance to make a decision on your Prior Authorization can take 7-21 business days.

## 2025-03-21 NOTE — TELEPHONE ENCOUNTER
PA for Temazepam 7.5 mg APPROVED     Date(s) approved 3/20/25-3/20/25    Case #[id number]    Patient advised by          [x][employer] Message  []Phone call   [x]LMOM  []L/M to call office as no active Communication consent on file  []Unable to leave detailed message as VM not approved on Communication consent       Pharmacy advised by    [x]Fax  []Phone call  []Secure Chat    Specialty Pharmacy    []     Approval letter scanned into [employer] No

## 2025-04-16 ENCOUNTER — OFFICE VISIT (OUTPATIENT)
Dept: FAMILY MEDICINE CLINIC | Facility: CLINIC | Age: 35
End: 2025-04-16
Payer: COMMERCIAL

## 2025-04-16 VITALS
BODY MASS INDEX: 30.46 KG/M2 | HEART RATE: 79 BPM | TEMPERATURE: 96.5 F | SYSTOLIC BLOOD PRESSURE: 128 MMHG | WEIGHT: 201 LBS | HEIGHT: 68 IN | OXYGEN SATURATION: 97 % | DIASTOLIC BLOOD PRESSURE: 76 MMHG

## 2025-04-16 DIAGNOSIS — F51.01 PRIMARY INSOMNIA: ICD-10-CM

## 2025-04-16 PROCEDURE — 99214 OFFICE O/P EST MOD 30 MIN: CPT | Performed by: NURSE PRACTITIONER

## 2025-04-16 RX ORDER — TEMAZEPAM 22.5 MG/1
22.5 CAPSULE ORAL
Qty: 30 CAPSULE | Refills: 0 | Status: SHIPPED | OUTPATIENT
Start: 2025-04-16

## 2025-04-16 NOTE — ASSESSMENT & PLAN NOTE
Patient presents for follow up on insomnia after starting Temazepam 7.5 mg. She states it has not helped at all. She states that she is still only sleeping 2 hours a night. Will increase to 22.5 mg and see if this helps. If is does not, she will call the office and consider switching to Ambien or Lunesta.   Orders:  •  temazepam (RESTORIL) 22.5 MG capsule; Take 1 capsule (22.5 mg total) by mouth daily at bedtime as needed for sleep

## 2025-04-16 NOTE — PROGRESS NOTES
"Name: Evette eMi      : 1990      MRN: [id number]  Encounter Provider: STEPHANY Pascual  Encounter Date: 2025   Encounter department: [facility]  :  Assessment & Plan  Primary insomnia  Patient presents for follow up on insomnia after starting Temazepam 7.5 mg. She states it has not helped at all. She states that she is still only sleeping 2 hours a night. Will increase to 22.5 mg and see if this helps. If is does not, she will call the office and consider switching to Ambien or Lunesta.   Orders:  •  temazepam (RESTORIL) 22.5 MG capsule; Take 1 capsule (22.5 mg total) by mouth daily at bedtime as needed for sleep           History of Present Illness   HPI  Review of Systems   Constitutional:  Negative for chills and fever.   Respiratory:  Negative for cough and shortness of breath.    Cardiovascular:  Negative for chest pain and palpitations.   Gastrointestinal:  Negative for abdominal pain and vomiting.   Musculoskeletal:  Negative for arthralgias and back pain.   Skin:  Negative for color change and rash.   Neurological:  Negative for dizziness, seizures, syncope and headaches.   Psychiatric/Behavioral:  Positive for sleep disturbance. Negative for dysphoric mood, self-injury and suicidal ideas.    All other systems reviewed and are negative.      Objective   /76   Pulse 79   Temp (!) 96.5 °F (35.8 °C)   Ht 5' 8\" (1.727 m)   Wt 91.2 kg (201 lb)   SpO2 97%   BMI 30.56 kg/m²      Physical Exam  Vitals and nursing note reviewed.   Constitutional:       General: She is not in acute distress.     Appearance: She is well-developed.   HENT:      Head: Normocephalic and atraumatic.   Cardiovascular:      Rate and Rhythm: Normal rate.   Pulmonary:      Effort: No respiratory distress.   Skin:     General: Skin is warm and dry.   Neurological:      General: No focal deficit present.      Mental Status: She is alert and oriented to person, place, and " time. Mental status is at baseline.   Psychiatric:         Mood and Affect: Mood normal.         Behavior: Behavior normal.         Thought Content: Thought content normal.         Judgment: Judgment normal.

## 2025-05-06 ENCOUNTER — TELEPHONE (OUTPATIENT)
Dept: FAMILY MEDICINE CLINIC | Facility: CLINIC | Age: 35
End: 2025-05-06

## 2025-05-06 NOTE — TELEPHONE ENCOUNTER
Left message for patient she has apt on 6/4/25 at 9:45 am I wanted to see if she can come in at either 9:15 am or 10:15 am pending apt still available   (due to aneudy change original apt blocks a new pt slot)

## 2025-05-28 ENCOUNTER — OFFICE VISIT (OUTPATIENT)
Dept: URGENT CARE | Facility: CLINIC | Age: 35
End: 2025-05-28
Payer: COMMERCIAL

## 2025-05-28 VITALS
DIASTOLIC BLOOD PRESSURE: 61 MMHG | SYSTOLIC BLOOD PRESSURE: 135 MMHG | OXYGEN SATURATION: 98 % | RESPIRATION RATE: 20 BRPM | BODY MASS INDEX: 30.87 KG/M2 | TEMPERATURE: 98.6 F | HEART RATE: 68 BPM | WEIGHT: 203 LBS

## 2025-05-28 DIAGNOSIS — J40 BRONCHITIS: ICD-10-CM

## 2025-05-28 DIAGNOSIS — J02.9 ACUTE PHARYNGITIS, UNSPECIFIED ETIOLOGY: Primary | ICD-10-CM

## 2025-05-28 LAB — S PYO AG THROAT QL: NEGATIVE

## 2025-05-28 PROCEDURE — 99213 OFFICE O/P EST LOW 20 MIN: CPT | Performed by: PHYSICIAN ASSISTANT

## 2025-05-28 PROCEDURE — 87880 STREP A ASSAY W/OPTIC: CPT | Performed by: PHYSICIAN ASSISTANT

## 2025-05-28 RX ORDER — AZITHROMYCIN 250 MG/1
TABLET, FILM COATED ORAL
Qty: 6 TABLET | Refills: 0 | Status: SHIPPED | OUTPATIENT
Start: 2025-05-28 | End: 2025-06-01

## 2025-05-28 RX ORDER — BENZONATATE 100 MG/1
100 CAPSULE ORAL 3 TIMES DAILY PRN
Qty: 20 CAPSULE | Refills: 0 | Status: SHIPPED | OUTPATIENT
Start: 2025-05-28 | End: 2025-06-04

## 2025-05-28 NOTE — PROGRESS NOTES
[facility]        NAME: Evette Mei is a 34 y.o. female  : 1990    MRN: [id number]  DATE: May 28, 2025  TIME: 1:44 PM    /61   Pulse 68   Temp 98.6 °F (37 °C)   Resp 20   Wt 92.1 kg (203 lb)   SpO2 98%   BMI 30.87 kg/m²     Assessment and Plan   Acute pharyngitis, unspecified etiology [J02.9]  1. Acute pharyngitis, unspecified etiology  POCT rapid strepA    benzonatate (TESSALON PERLES) 100 mg capsule    azithromycin (ZITHROMAX) 250 mg tablet      2. Bronchitis  benzonatate (TESSALON PERLES) 100 mg capsule    azithromycin (ZITHROMAX) 250 mg tablet            Patient Instructions       Follow up with PCP in 3-5 days.  Proceed to  ER if symptoms worsen.    Chief Complaint     Chief Complaint   Patient presents with    Sore Throat     X one day and then went  away - relative positive for strp    Cough     Cough with chest congestion x 3 days          History of Present Illness       Pt with sore throat and slight productice cough for several days     Sore Throat   Associated symptoms include coughing.       Review of Systems   Review of Systems   Constitutional: Negative.    HENT:  Positive for sore throat.    Eyes: Negative.    Respiratory:  Positive for cough.    Cardiovascular: Negative.    Gastrointestinal: Negative.    Endocrine: Negative.    Genitourinary: Negative.    Musculoskeletal: Negative.    Skin: Negative.    Allergic/Immunologic: Negative.    Neurological: Negative.    Hematological: Negative.    Psychiatric/Behavioral: Negative.     All other systems reviewed and are negative.        Current Medications     Current Medications[1]    Current Allergies     Allergies as of 2025 - Reviewed 2025   Allergen Reaction Noted    Sulfa antibiotics Lip Swelling 2018    Ciprofloxacin Rash 2025            The following portions of the patient's history were reviewed and updated as appropriate: allergies, current medications, past family history, past medical  history, past social history, past surgical history and problem list.     Past Medical History[2]    Past Surgical History[3]    Family History[4]      Medications have been verified.        Objective   /61   Pulse 68   Temp 98.6 °F (37 °C)   Resp 20   Wt 92.1 kg (203 lb)   SpO2 98%   BMI 30.87 kg/m²        Physical Exam     Physical Exam  Vitals and nursing note reviewed.   Constitutional:       Appearance: Normal appearance. She is normal weight.   HENT:      Head: Normocephalic and atraumatic.      Right Ear: Tympanic membrane, ear canal and external ear normal.      Left Ear: Tympanic membrane, ear canal and external ear normal.      Nose: Nose normal.      Mouth/Throat:      Mouth: Mucous membranes are moist.      Pharynx: Oropharynx is clear. Posterior oropharyngeal erythema present.     Cardiovascular:      Rate and Rhythm: Normal rate and regular rhythm.      Pulses: Normal pulses.      Heart sounds: Normal heart sounds.   Pulmonary:      Effort: Pulmonary effort is normal.      Comments: Minor coarse sounds cleared with cough   Abdominal:      Palpations: Abdomen is soft.     Musculoskeletal:         General: Normal range of motion.      Cervical back: Normal range of motion and neck supple.     Skin:     General: Skin is warm.      Capillary Refill: Capillary refill takes less than 2 seconds.     Neurological:      Mental Status: She is alert and oriented to person, place, and time.                          [1]   Current Outpatient Medications:     azithromycin (ZITHROMAX) 250 mg tablet, Take 2 tablets today then 1 tablet daily x 4 days, Disp: 6 tablet, Rfl: 0    benzonatate (TESSALON PERLES) 100 mg capsule, Take 1 capsule (100 mg total) by mouth 3 (three) times a day as needed for cough, Disp: 20 capsule, Rfl: 0    PARAGARD INTRAUTERINE COPPER IU, by Intrauterine route, Disp: , Rfl:     temazepam (RESTORIL) 22.5 MG capsule, Take 1 capsule (22.5 mg total) by mouth daily at bedtime as needed for  sleep, Disp: 30 capsule, Rfl: 0    vitamin B-12 (VITAMIN B-12) 1,000 mcg tablet, Take by mouth daily, Disp: , Rfl:   [2]   Past Medical History:  Diagnosis Date    Acid reflux     silent refulx    Anorexia     Anxiety     since childhood    Bulimia     Depression     since childhood    Eating disorder     bulimia from ages 9 - 21, currently recovered    GERD (gastroesophageal reflux disease)     Hemorrhoids     MRSA infection 2014    in R underarm   [3]   Past Surgical History:  Procedure Laterality Date    NO PAST SURGERIES      UPPER GASTROINTESTINAL ENDOSCOPY     [4]   Family History  Problem Relation Name Age of Onset    Deep vein thrombosis Mother Silvina     Gallbladder disease Mother Silvina     Asthma Mother Silvina     Bleeding Disorder Mother Silvina         on blood thinners, has had blood clots in her legs    Hypertension Father Deng     Coronary artery disease Father Deng     Colon polyps Father Deng     Scoliosis Sister Jeni     ADD / ADHD Sister Jeni     Gallbladder disease Sister Jeni     Depression Sister Jeni     Irritable bowel syndrome Sister Jeni     Learning disabilities Sister Jeni     Uterine cancer Maternal Grandmother      Cervical cancer Paternal Grandfather Deshaun     Colon cancer Paternal Grandfather Deshaun          very young (56)    Breast cancer Other Great Maternal Aunt 60    Arthritis Paternal Grandmother Diana     Heart disease Paternal Grandmother Diana     Addiction problem Maternal Uncle Jeremias     Depression Maternal Uncle Jeremias     Diabetes Maternal Uncle Jeremias     Addiction problem Maternal Uncle Deshaun     Colon polyps Paternal Uncle Blair     Ovarian cancer Neg Hx

## 2025-06-03 NOTE — PROGRESS NOTES
Name: Evette Mei      : 1990      MRN: [id number]  Encounter Provider: STEPHANY Pascual  Encounter Date: 2025   Encounter department: [facility]  :  Assessment & Plan  Primary insomnia  Patient presents for routine follow up on insomnia. She was started on Temazepam and then had it increased to 22.5 mg prn.   Orders:  •  temazepam (RESTORIL) 30 mg capsule; Take 1 capsule (30 mg total) by mouth daily at bedtime as needed for sleep    Encounter for immunization    Orders:  •  HPV VACCINE 9 VALENT IM    Other insomnia  Patient presents for routine follow up on insomnia. She was started on Temazepam and then had it increased to 22.5 mg prn.   - Patient states that she felt like the Restoril was helping in the beginning but she now continues to have difficulty falling asleep and staying asleep.  She is sleeping approximately 3 and half hours.  Will increase to 30 mg as needed.  Sleep hygiene reviewed       Acute cough  Patient was seen at urgent care and placed on Zithromax for acute pharyngitis but states she was told that she had walking pneumonia.  Patient had a sore throat for approximately 1 day.  Then the sore throat resolved and she had a cough which is congested but nonproductive.  Patient was given Zithromax.  Helped some but cough is lingering.   Understands cough can last up to 21 days after infection.   - With over-the-counter medications and signs and symptoms and when to return reviewed.       Yeast infection  States that every time she takes an antibiotic, she gets a yeast infection.  Requesting Diflucan which was sent to the pharmacy for patient.  Orders:  •  fluconazole (DIFLUCAN) 150 mg tablet; Take 1 tablet (150 mg total) by mouth once for 1 dose    Shortness of breath  That since  she has had shortness of breath and felt like she cannot take a deep breath.  She states it is constant and has never improved over time.  Has had unremarkable  "chest x-ray and pulmonary function testing.  No smoking history.  States that she needs to get to the bottom as to why she feels short of breath all the time.  No other associated symptoms.  Frustrated that nobody has investigated this further for her in the past.  CT of the chest ordered but instructed patient to wait at least 4 weeks before having this completed as she just had recent URI infection.  Orders:  •  CT chest wo contrast; Future           History of Present Illness   HPI  Review of Systems   Constitutional:  Negative for activity change, appetite change, chills and fever.   HENT:  Negative for ear pain and sore throat.    Eyes:  Negative for pain and visual disturbance.   Respiratory:  Positive for cough and shortness of breath. Negative for chest tightness.    Cardiovascular:  Negative for chest pain and palpitations.   Gastrointestinal:  Negative for abdominal pain and vomiting.   Genitourinary:  Negative for dysuria and hematuria.   Musculoskeletal:  Negative for arthralgias and back pain.   Skin:  Negative for color change and rash.   Neurological:  Negative for seizures and syncope.   Psychiatric/Behavioral:  Positive for sleep disturbance. Negative for suicidal ideas. The patient is not nervous/anxious.    All other systems reviewed and are negative.      Objective   /80   Pulse 81   Temp 97.5 °F (36.4 °C)   Ht 5' 8\" (1.727 m)   Wt 91.9 kg (202 lb 9.6 oz)   SpO2 98%   BMI 30.81 kg/m²      Physical Exam  Vitals and nursing note reviewed.   Constitutional:       General: She is not in acute distress.     Appearance: Normal appearance. She is well-developed and normal weight.   HENT:      Head: Normocephalic and atraumatic.      Right Ear: Tympanic membrane, ear canal and external ear normal. There is no impacted cerumen.      Left Ear: Tympanic membrane, ear canal and external ear normal. There is no impacted cerumen.      Nose: Nose normal. No congestion or rhinorrhea.      " Mouth/Throat:      Mouth: Mucous membranes are moist.      Pharynx: Oropharynx is clear. No oropharyngeal exudate or posterior oropharyngeal erythema.     Eyes:      General: No scleral icterus.        Right eye: No discharge.         Left eye: No discharge.      Extraocular Movements: Extraocular movements intact.      Conjunctiva/sclera: Conjunctivae normal.      Pupils: Pupils are equal, round, and reactive to light.       Cardiovascular:      Rate and Rhythm: Normal rate and regular rhythm.      Pulses: Normal pulses.      Heart sounds: Normal heart sounds. No murmur heard.  Pulmonary:      Effort: Pulmonary effort is normal. No respiratory distress.      Breath sounds: Normal breath sounds. No wheezing or rales.   Abdominal:      General: Abdomen is flat. Bowel sounds are normal. There is no distension.      Palpations: Abdomen is soft.      Tenderness: There is no abdominal tenderness. There is no guarding.     Musculoskeletal:         General: Normal range of motion.      Cervical back: Normal range of motion and neck supple. No rigidity or tenderness.   Lymphadenopathy:      Cervical: No cervical adenopathy.     Skin:     General: Skin is warm and dry.      Capillary Refill: Capillary refill takes less than 2 seconds.     Neurological:      General: No focal deficit present.      Mental Status: She is alert and oriented to person, place, and time. Mental status is at baseline.     Psychiatric:         Mood and Affect: Mood normal.         Behavior: Behavior normal.         Thought Content: Thought content normal.         Judgment: Judgment normal.

## 2025-06-03 NOTE — ASSESSMENT & PLAN NOTE
Patient presents for routine follow up on insomnia. She was started on Temazepam and then had it increased to 22.5 mg prn.   - Patient states that she felt like the Restoril was helping in the beginning but she now continues to have difficulty falling asleep and staying asleep.  She is sleeping approximately 3 and half hours.  Will increase to 30 mg as needed.  Sleep hygiene reviewed

## 2025-06-04 ENCOUNTER — OFFICE VISIT (OUTPATIENT)
Dept: FAMILY MEDICINE CLINIC | Facility: CLINIC | Age: 35
End: 2025-06-04
Payer: COMMERCIAL

## 2025-06-04 VITALS
HEART RATE: 81 BPM | OXYGEN SATURATION: 98 % | DIASTOLIC BLOOD PRESSURE: 80 MMHG | WEIGHT: 202.6 LBS | HEIGHT: 68 IN | BODY MASS INDEX: 30.71 KG/M2 | SYSTOLIC BLOOD PRESSURE: 122 MMHG | TEMPERATURE: 97.5 F

## 2025-06-04 DIAGNOSIS — Z23 ENCOUNTER FOR IMMUNIZATION: ICD-10-CM

## 2025-06-04 DIAGNOSIS — R05.1 ACUTE COUGH: ICD-10-CM

## 2025-06-04 DIAGNOSIS — R06.02 SHORTNESS OF BREATH: ICD-10-CM

## 2025-06-04 DIAGNOSIS — B37.9 YEAST INFECTION: ICD-10-CM

## 2025-06-04 DIAGNOSIS — G47.09 OTHER INSOMNIA: ICD-10-CM

## 2025-06-04 DIAGNOSIS — F51.01 PRIMARY INSOMNIA: Primary | ICD-10-CM

## 2025-06-04 PROCEDURE — 99214 OFFICE O/P EST MOD 30 MIN: CPT | Performed by: NURSE PRACTITIONER

## 2025-06-04 RX ORDER — TEMAZEPAM 30 MG/1
30 CAPSULE ORAL
Qty: 30 CAPSULE | Refills: 0 | Status: SHIPPED | OUTPATIENT
Start: 2025-06-04

## 2025-06-04 RX ORDER — FLUCONAZOLE 150 MG/1
150 TABLET ORAL ONCE
Qty: 1 TABLET | Refills: 0 | Status: SHIPPED | OUTPATIENT
Start: 2025-06-04 | End: 2025-06-04

## 2025-06-04 NOTE — ASSESSMENT & PLAN NOTE
Patient presents for routine follow up on insomnia. She was started on Temazepam and then had it increased to 22.5 mg prn.   Orders:  •  temazepam (RESTORIL) 30 mg capsule; Take 1 capsule (30 mg total) by mouth daily at bedtime as needed for sleep

## 2025-06-09 DIAGNOSIS — R06.02 SHORTNESS OF BREATH: Primary | ICD-10-CM

## 2025-06-11 ENCOUNTER — OFFICE VISIT (OUTPATIENT)
Dept: FAMILY MEDICINE CLINIC | Facility: CLINIC | Age: 35
End: 2025-06-11
Payer: COMMERCIAL

## 2025-06-11 VITALS
HEART RATE: 86 BPM | BODY MASS INDEX: 31.07 KG/M2 | DIASTOLIC BLOOD PRESSURE: 68 MMHG | OXYGEN SATURATION: 98 % | HEIGHT: 68 IN | WEIGHT: 205 LBS | SYSTOLIC BLOOD PRESSURE: 116 MMHG | TEMPERATURE: 96.8 F

## 2025-06-11 DIAGNOSIS — I78.1 TELANGIECTASIA OF SKIN: Primary | ICD-10-CM

## 2025-06-11 PROCEDURE — 99212 OFFICE O/P EST SF 10 MIN: CPT | Performed by: NURSE PRACTITIONER

## 2025-06-11 NOTE — PROGRESS NOTES
"Name: Evette Mei      : 1990      MRN: [id number]  Encounter Provider: STEPHANY Pascual  Encounter Date: 2025   Encounter department: [facility]  :  Assessment & Plan  Telangiectasia of skin  Very small red dot under the right eye that she noticed, no pain, drainage, not growing.   S/s of when to return reviewed               History of Present Illness   HPI  Review of Systems   Skin:  Positive for color change. Negative for pallor, rash and wound.       Objective   /68   Pulse 86   Temp (!) 96.8 °F (36 °C)   Ht 5' 8\" (1.727 m)   Wt 93 kg (205 lb)   SpO2 98%   BMI 31.17 kg/m²      Physical Exam  Vitals and nursing note reviewed.   Constitutional:       General: She is not in acute distress.     Appearance: Normal appearance. She is not ill-appearing or toxic-appearing.   HENT:      Head:       Neurological:      Mental Status: She is alert.         "

## 2025-08-19 ENCOUNTER — NURSE TRIAGE (OUTPATIENT)
Age: 35
End: 2025-08-19

## 2025-08-20 ENCOUNTER — OFFICE VISIT (OUTPATIENT)
Dept: FAMILY MEDICINE CLINIC | Facility: CLINIC | Age: 35
End: 2025-08-20
Payer: COMMERCIAL

## 2025-08-20 VITALS
TEMPERATURE: 98.8 F | HEART RATE: 84 BPM | OXYGEN SATURATION: 98 % | DIASTOLIC BLOOD PRESSURE: 70 MMHG | HEIGHT: 68 IN | BODY MASS INDEX: 30.92 KG/M2 | SYSTOLIC BLOOD PRESSURE: 118 MMHG | WEIGHT: 204 LBS

## 2025-08-20 DIAGNOSIS — R11.2 NAUSEA AND VOMITING, UNSPECIFIED VOMITING TYPE: ICD-10-CM

## 2025-08-20 DIAGNOSIS — Z13.220 SCREENING CHOLESTEROL LEVEL: ICD-10-CM

## 2025-08-20 DIAGNOSIS — F51.01 PRIMARY INSOMNIA: ICD-10-CM

## 2025-08-20 DIAGNOSIS — R23.2 HOT FLASHES: Primary | ICD-10-CM

## 2025-08-20 DIAGNOSIS — R53.83 OTHER FATIGUE: ICD-10-CM

## 2025-08-20 PROCEDURE — 99214 OFFICE O/P EST MOD 30 MIN: CPT | Performed by: NURSE PRACTITIONER

## 2025-08-20 RX ORDER — TEMAZEPAM 30 MG/1
30 CAPSULE ORAL
Qty: 30 CAPSULE | Refills: 0 | Status: SHIPPED | OUTPATIENT
Start: 2025-08-20

## 2025-08-20 RX ORDER — ONDANSETRON 4 MG/1
4 TABLET, FILM COATED ORAL EVERY 8 HOURS PRN
Qty: 20 TABLET | Refills: 0 | Status: SHIPPED | OUTPATIENT
Start: 2025-08-20